# Patient Record
Sex: FEMALE | Race: WHITE | NOT HISPANIC OR LATINO | Employment: OTHER | URBAN - METROPOLITAN AREA
[De-identification: names, ages, dates, MRNs, and addresses within clinical notes are randomized per-mention and may not be internally consistent; named-entity substitution may affect disease eponyms.]

---

## 2022-01-01 ENCOUNTER — DOCUMENTATION (OUTPATIENT)
Dept: UROLOGY | Facility: CLINIC | Age: 82
End: 2022-01-01

## 2022-01-01 DIAGNOSIS — R56.9 SEIZURE (HCC): ICD-10-CM

## 2022-01-01 DIAGNOSIS — H54.7 VISUAL LOSS: Primary | ICD-10-CM

## 2022-01-01 DIAGNOSIS — R56.9 SEIZURE (HCC): Primary | ICD-10-CM

## 2022-01-01 RX ORDER — LEVETIRACETAM 100 MG/ML
SOLUTION ORAL
Qty: 473 ML | Refills: 3 | Status: SHIPPED | OUTPATIENT
Start: 2022-01-01 | End: 2023-01-01 | Stop reason: SDUPTHER

## 2022-05-31 PROBLEM — R13.10 DYSPHAGIA: Status: ACTIVE | Noted: 2022-05-31

## 2022-06-10 PROBLEM — G89.29 CHRONIC LOW BACK PAIN WITHOUT SCIATICA: Status: ACTIVE | Noted: 2022-06-10

## 2022-08-31 ENCOUNTER — TELEPHONE (OUTPATIENT)
Dept: NEUROLOGY | Facility: CLINIC | Age: 82
End: 2022-08-31

## 2022-08-31 NOTE — TELEPHONE ENCOUNTER
Received voicemail from [facility] to clarify prescriptions for Keppra  Requesting call back [phone]  Pharmacist said he would combine the 2 prescriptions sent (one 5 ml every 12 hours and the other 2 5 ml after dialysis on dialysis days) as that is the only way insurance will pay for it with instruction as follows  "Take 5 mL (500 mg total) by mouth every 12 (twelve) hours and Take 2 5 mL (250 mg total) by mouth After Dialysis (on dialysis days)  "

## 2022-09-01 PROBLEM — D50.9 IRON DEFICIENCY ANEMIA: Status: ACTIVE | Noted: 2022-09-01

## 2022-09-02 ENCOUNTER — TELEPHONE (OUTPATIENT)
Dept: NEUROLOGY | Facility: CLINIC | Age: 82
End: 2022-09-02

## 2022-09-02 NOTE — TELEPHONE ENCOUNTER
I spoke to patient's daughter who requested I emailed a lab slip  E-mail to address in demographics  Will have done at dialysis today

## 2022-09-02 NOTE — TELEPHONE ENCOUNTER
----- Message from Candice Webb PA-C sent at 9/1/2022  2:23 PM EDT -----  Regarding: Keppra level  Hi,    I entered a Keppra level for this pt to be done at dialysis  Can you call and make sure the daughter is aware that she should have it done? Thanks      Cheo Oh

## 2022-09-12 PROBLEM — E83.42 HYPOMAGNESEMIA: Status: ACTIVE | Noted: 2022-09-12

## 2022-10-17 ENCOUNTER — TELEPHONE (OUTPATIENT)
Dept: NEUROLOGY | Facility: CLINIC | Age: 82
End: 2022-10-17

## 2022-10-17 NOTE — TELEPHONE ENCOUNTER
MSW retrieved a message left by patient's daughter, [facility] ([phone])  [facility] stated that she had spoken with this writer previously about issues with her mother (MSW was involved to communicate with patient's dialysis clinic when extra treatments were necessary due to MRI)  [facility] stated that patient's vision has changed, that patient "cannot hardly see anything"  [facility] stated that she is not sure if she should just take her to an eye doctor  MSW called back to patient's daughter at [phone]  MSW advised that this writer is a , but will forward her clinical concerns on to the clinical team and provider  [facility] stated that patient has always worn the glasses you can buy at the store, and they have bought her 3 5 which is the highest strength they can find  [facility] stated that despite having the highest strength that it is "not good enough"  [facility] stated that patient is asking her to leave the lights on more so that she can see better and that she can no longer see to do crossword puzzles  MSW advised that the clinical team may call her with more questions, but that this writer will follow to see if an eye doctor referral is warranted  If so, MSW will assist in locating a list of in-network eye doctors

## 2022-10-18 NOTE — TELEPHONE ENCOUNTER
MSW obtained the following list of ophthalmologists from the www medicare  gov website:    Adán Moran MD  Charges the Medicare-approved amount (so you pay less out-of-pocket)  [address]  [facility], [facility]  [phone]    Leonides Ohara MD  [facility]  Charges the Medicare-approved amount (so you pay less out-of-pocket)  [address] [place], [place] [address] [phone]  Specialties  Ophthalmology    [facility] AFFILIATIONS  Charges the Medicare-approved amount (so you pay less out-of-pocket)  [facility] [address], 2021 [facility]   [phone]    [address] AFFILIATIONS  Charges the Medicare-approved amount (so you pay less out-of-pocket)  [address] [facility], 2021 [facility]   [phone]    MD Kasi Eng IV, MD  Charges the Medicare-approved amount (so you pay less out-of-pocket)  [address] [facility]   [facility], [address]  [phone]    [facility]   Lorraine Napier 1945 PC  Charges the Medicare-approved amount (so you pay less out-of-pocket)  [facility], 2021 [facility]   [phone]) [facility]  Charges the Medicare-approved amount (so you pay less out-of-pocket)  [facility] [address], 2021 [facility]   [phone]    [facility] AFFILIATIONS  Charges the Medicare-approved amount (so you pay less out-of-pocket)  [facility] [facility], 2021 [facility]   [phone]    MD Kasi Locke IV, MD  Charges the Medicare-approved amount (so you pay less out-of-pocket)  [address] [facility], [address]  [phone]    Kamara Fidencio  [facility] [address]  PC  Charges the Medicare-approved amount (so you pay less out-of-pocket)  [address] [place], [place] [address]  [phone]  [address] [place] [address] PC  Charges the Medicare-approved amount (so you pay less out-of-pocket)  59[address] [facility], 2021 [facility]   [phone]) [place] [address]  [address] PC  Charges the Medicare-approved amount (so you pay less out-of-pocket)  [address] [facility], 2021 [facility]   [phone]) Caroline U  79   [address] PC  Charges the Medicare-approved amount (so you pay less out-of-pocket)  [address] [facility], 2021 [facility]   [phone]    Humphrey Adorno  MULTIPLE GROUP AFFILIATIONS  Charges the Medicare-approved amount (so you pay less out-of-pocket)  [address] [facility], 2021 [facility]   [phone]    Fidencio A  [address] PC  Charges the Medicare-approved amount (so you pay less out-of-pocket)  59[address] [facility], 2021 [facility]   [phone]    David Garsia  Charges the [employer] amount (so you pay less out-of-pocket)  5900 [facility], [facility]  [phone]    [facility]  Charges the Medicare-approved amount (so you pay less out-of-pocket)  [facility]  28[facility], [address]  [phone] [address]  Charges the Medicare-approved amount (so you pay less out-of-pocket)  [facility]  2801 [facility], [place], [place] [address] [phone]    Omaira Ordaz MD, PC  Charges the Medicare-approved amount (so you pay less out-of-pocket)  [address] [place] [address] [phone]    Monica Hammond MD  MULTIPLE GROUP AFFILIATIONS  Charges the Medicare-approved amount (so you pay less out-of-pocket)  [address] [place] [address] [phone]    Giacomo Lott MD  MULTIPLE GROUP AFFILIATIONS  Charges the Medicare-approved amount (so you pay less out-of-pocket)  [address] [place], [place] [address]  [phone] [facility]  Charges the Medicare-approved amount (so you pay less out-of-pocket)  [address] [facility] 8977  [phone] [facility]  Charges the Medicare-approved amount (so you pay less out-of-pocket)  [address] [facility] 89  [phone]    Roger Johnson MD  MULTIPLE GROUP AFFILIATIONS  Charges the Medicare-approved amount (so you pay less out-of-pocket)  [address]  [facility], [place] [address]  [phone]  [address]  Charges the Medicare-approved amount (so you pay less out-of-pocket)  [address] [place] [address]  [phone]    MD [facility] amount (so you pay less out-of-pocket)  [facility], 1430 [facility]  [phone]) [address] [place] MD Brisa  [address] PC  Charges the Medicare-approved amount (so you pay less out-of-pocket)  [address], [address] [place]  [phone]    David Childers 105 PC  Charges the Medicare-approved amount (so you pay less out-of-pocket)  [address], [address]  [phone]    [address] [place] [address] AFFILIATIONS  Charges the Medicare-approved amount (so you pay less out-of-pocket)  [address] [facility], [address]  [phone]    [facility]   [address] [place] [address] PC  Charges the Medicare-approved amount (so you pay less out-of-pocket)  [address], [address] [facility] [address]  [phone]    Maria Guadalupe Patterson  Charges the Medicare-approved amount (so you pay less out-of-pocket)  Performance information available  [address]  [place], [address] [place] [address]  [phone]    Geraldo Amaya  MULTIPLE GROUP AFFILIATIONS  Charges the Medicare-approved amount (so you pay less out-of-pocket)  [address], 700 [facility]  [phone]    [facility]38 [facility] Shon Kuo  Charges the [employer] amount (so you pay less out-of-pocket)  [address], [address]   [phone]    [facility] Sicard Bunin  Charges the Medicare-approved amount (so you pay less out-of-pocket)  [address]  [facility]  [place], 102 \[facility] [place] [facility]\""  [phone]    [facility] P C  Charges the Medicare-approved amount (so you pay less out-of-pocket)  [address], [address]  [phone]    MSW will need to contact the above office to see if they accept [employer] as secondary

## 2022-10-19 ENCOUNTER — TELEPHONE (OUTPATIENT)
Dept: PAIN MEDICINE | Facility: CLINIC | Age: 82
End: 2022-10-19

## 2022-10-19 NOTE — TELEPHONE ENCOUNTER
Caller: Daughter     Doctor: Geovanna Kowalski    Reason for call: Wants to schedule injection for the Lt side     Call back#: [phone]

## 2022-10-20 NOTE — TELEPHONE ENCOUNTER
Patient daughter Harsha Garcia was contacted and scheduled patient for right and left hip injections   Patient will keep appointment to f/u from injection

## 2022-10-20 NOTE — TELEPHONE ENCOUNTER
MSW was able to contact the following ophthalmology offices to inquire if they accept Medicare primary and [address] as secondary  Available options are as follows:    Brad Mcintosh  [address]  5601 [facility], [address] [facility] [address]  [facility], [facility]  [phone]  *MSW left message requesting callback  Awaiting same  Dr Zita Blackwell (independent provider)  [facility], 301 [facility] 83,8Th Floor 200  [facility], [address]  [phone]  *spoke with Magalis Gore, they accept both insurances  New patients being scheduled late Dec/early Jan but if emergency, they may be able to schedule sooner  Dr Tahmina Santos (independent provider)  [facility], 1185 [facility], [address]  [phone]  *spoke with Lea Queen - he only takes Medicare, but they could bill the remaining 20% to patient (would need to pay up front)  Scheduling in early November  Dr [facility]  [address] [place] [address], [address]  *spoke with Linda Herman - they can accept Medicare, and can bill to Medicaid  [facility]  [address]  [address], [address]  [phone]  *spoke with [facility] - they accept both  Would need demographics and doctor to doctor consult (explaining why patient needs to be seen) faxed to them at [phone]  If accepted, they will call patient to schedule  [facility], [address], [address] [place] [address]  [phone]  *spoke with Kristin Bashir - they can bill Medicare  Patient would need to pay 20% as they cannot bill Medicaid    Dr Marlys Haynes (independent provider)  [address], [address]  [phone]  *spoke with Saskia Galvez - they accept both of patient's insurances  Would need referral faxed to [phone]  MSW will await callback before sending options to patient's daughter

## 2022-10-21 NOTE — TELEPHONE ENCOUNTER
MSW received callback from Zion at Dr Ghassan Braga office- he stated that they do accept Medicare and [address] as secondary  Zion stated that they eye exam would not be covered, but that glasses would not be  MSW will send the following updated list of in-network to sara's daughter:    Don Fonseca W  5601 [facility], [address]  [place], 98 [facility]  [phone]  *spoke with Zion, they do accept both Medicare and [employer]  Insurance would cover the assessment, but not glasses (if needed)       Dr Lisa Miles (independent provider)  [facility] 94, [address] [facility], [address]  [phone]  *spoke with Kiana Fuentes, they accept both insurances  New patients being scheduled late Dec/early Jan but if emergency, they may be able to schedule sooner      Dr Aníbal Hernandez (independent provider)  [facility] 94, 1185 [facility], [address]  [phone]  *spoke with Amedeo Gaucher - he only takes Medicare, but they could bill the remaining 20% to patient (would need to pay up front)  Scheduling in early November      Dr Ernst [facility]  [address] [facility], 98 [facility]  [phone]  *spoke with Maria Fernanda Hollingsworth - they can accept Medicare, and can bill to [address] [facility]  [address]  [address], [address]  [phone]  *spoke with Lissy Paul - they accept both insurances  Would need demographics and doctor to doctor consult (explaining why patient needs to be seen) faxed to them at [phone]  If accepted, they will call patient to schedule       [facility], [facility] 1268  [place], 210 [facility]  [phone]  *spoke with Catherine Byers - they can bill Medicare  Patient would need to pay 20% as they cannot bill Medicaid     Dr Daniel Michaels (independent provider)  [address], [address]  [phone]  *spoke with Khanh Ruiz - they accept both of patient's insurances   Would need referral faxed to [phone]      MSW attempted to reach patient's daughter to make her aware that list has been compiled and to see how should would like to receive same  No answer at [phone]  MSW left a message requesting callback  Awaiting same

## 2022-10-21 NOTE — TELEPHONE ENCOUNTER
Patient's daughter called back and asked that list of ophthalmologists be emailed to her at [email or web address]  MSW sent the following email:    "Elba Courtney,    Here are the [facility] ophthalmologists  The bolded ones are the practices that accept both insurances  Please let me know which practice you decide to go with  Some will require a referral to be sent from our office  Antonia Kit  [address]  5601 [facility], [address] [facility] [address]  [facility], [facility]  [phone]  *spoke with Zion, they do accept both Medicare and [employer]  Insurance would cover the assessment, but not glasses (if needed)  Dr Juna Banks (independent provider)  [facility] 94, 301 [facility] 83,8Th Floor 200  [facility], [address]  [phone]  *[facility] with [facility], they accept both insurances  New patients being scheduled late Dec/early Jan but if emergency, they may be able to schedule sooner  Dr Charlee Ayala (independent provider)  [facility] 94, 1185 [facility], [address]   [phone]  *spoke with [facility] - he only takes Medicare, but they could bill the remaining 20% to patient (would need to pay up front)  Scheduling in early November  Dr Suki D[facility]  [address] [place] [address], [address]  *spoke with Ksenia Chau - they can accept Medicare, and can bill to Medicaid (unsure if secondary would pay)     [facility]  [address]  [address] [facility], [address]  [phone]  *spoke with Teresa Kumar - they accept both insurances  Would need demographics and doctor to doctor consult (explaining why patient needs to be seen) faxed to them at [phone]  If accepted, they will call patient to schedule  [facility], [facility] 1268  [place], 210 [facility]  [phone]  *spoke with Erin Smith - they can bill Medicare   Patient would need to pay 20% as they cannot bill Medicaid     Dr Agnes De La Vega (independent provider)  [address], [address]  [phone]  *spoke with Randolm Goodell - they accept both of patient's insurances  Would need referral faxed to [phone]  Best Regards,    Jaclyn Taylor, MSW   Douglas Mejía [facility]  [facility] 50, [address]  [phone] - phone  [phone] - fax   [facility]"

## 2022-10-24 NOTE — TELEPHONE ENCOUNTER
Caller: Harsha Garcia ( daughter )     Doctor: Dr Taj Mesa for call: Procedure     Call back#: [phone]

## 2022-10-24 NOTE — TELEPHONE ENCOUNTER
Called Vivian back and discussed B/L SIJ injection  Advised her next available date Patient would be eligible for B/L SIJ on the same day would be after January 12th   She will call  to that date

## 2022-10-25 NOTE — TELEPHONE ENCOUNTER
MSW received call from patient's daughter, Juliet Neil, this date  She would like patient to be referred to [facility]  A doctor to doctor referral is needed  MSW noted that script was placed, but will need to ask PA-C for reason for patient to be seen by ophthalmology instead of optometry  Mabel Gan, can you please advise?

## 2022-10-26 NOTE — TELEPHONE ENCOUNTER
MSW spoke with RENO this date  Reason for ophthalmology referral is history of carotid occulusion R/O vascular cause of vision loss, also patient has diabetes and renal failure which may affect vision  MSW faxed referral (patient demographics, copies of insurance cards, neuro note, and script) to [facility] at [phone] this date  Successful fax notice received  MSW will follow to ensure that referral is received and that patient gets scheduled in a timely manner

## 2022-10-27 NOTE — TELEPHONE ENCOUNTER
MSW phoned the [address] [facility] and spoke with Zeinab Plascencia at [phone]  They received the referral and it will be reviewed over the next 1-2 days  They will call patient's daughter early next week to schedule appt  MSW phoned patient's daughter to provide update  MSW will follow to ensure that appt is scheduled

## 2022-10-31 NOTE — TELEPHONE ENCOUNTER
Pts daughter called to say that there was issues with getting an appt made  She said they would not make an appt for her and that they do not take her insurance  She was also told they cant see her for the eye issues she is having  Please call daughter back for more clarification

## 2022-10-31 NOTE — TELEPHONE ENCOUNTER
MSW phoned [address] at [phone] and spoke with Zeinab Plascencia  She again stated that they do accept both of patient's plans  Zeinab Plascencia stated that the referral was reviewed by a tech and that they can schedule the patient  Zeinab Plascencia stated that she will call patient's daughter, Carolee Timmons, to schedule an appt  Zeinab Plascencia stated that there is another office with a similar name so it is possible that patient called the wrong office  MSW later received a callback from Zeinab Plascencia - patient is scheduled to see Dr Cleo Contreras at [facility] location on 11/16/22 at 815AM     MSW followed-up with patient's daughter, Carolee Timmons, she confirmed that patient is scheduled with [address] on 11/16/22  Carolee Timmons stated that she must have called the wrong office first and apologized for the error  MSW will be available to patient/family as needed

## 2022-11-01 ENCOUNTER — HOSPITAL ENCOUNTER (OUTPATIENT)
Dept: ULTRASOUND IMAGING | Facility: MEDICAL CENTER | Age: 82
Discharge: HOME | End: 2022-11-01

## 2022-11-01 DIAGNOSIS — R39.89 BLADDER PAIN: ICD-10-CM

## 2022-11-03 ENCOUNTER — OFFICE VISIT (OUTPATIENT)
Dept: PAIN MEDICINE | Facility: CLINIC | Age: 82
End: 2022-11-03

## 2022-11-03 VITALS
WEIGHT: 124 LBS | HEIGHT: 64 IN | DIASTOLIC BLOOD PRESSURE: 83 MMHG | SYSTOLIC BLOOD PRESSURE: 124 MMHG | BODY MASS INDEX: 21.17 KG/M2

## 2022-11-03 DIAGNOSIS — M54.50 CHRONIC LOW BACK PAIN WITHOUT SCIATICA, UNSPECIFIED BACK PAIN LATERALITY: Primary | ICD-10-CM

## 2022-11-03 DIAGNOSIS — Z98.1 STATUS POST LUMBAR SPINAL FUSION: ICD-10-CM

## 2022-11-03 DIAGNOSIS — M16.0 PRIMARY OSTEOARTHRITIS OF BOTH HIPS: ICD-10-CM

## 2022-11-03 DIAGNOSIS — Z79.01 CHRONIC ANTICOAGULATION: ICD-10-CM

## 2022-11-03 DIAGNOSIS — G89.29 CHRONIC LOW BACK PAIN WITHOUT SCIATICA, UNSPECIFIED BACK PAIN LATERALITY: Primary | ICD-10-CM

## 2022-11-03 DIAGNOSIS — M46.1 SACROILIITIS (HCC): ICD-10-CM

## 2022-11-03 RX ORDER — LORAZEPAM 0.5 MG/1
0.5 TABLET ORAL EVERY 8 HOURS PRN
COMMUNITY
Start: 2022-09-08

## 2022-11-03 RX ORDER — CLOPIDOGREL BISULFATE 75 MG/1
TABLET ORAL
COMMUNITY

## 2022-11-03 NOTE — PROGRESS NOTES
Assessment:  1  Chronic low back pain without sciatica, unspecified back pain laterality    2  Sacroiliitis ([address] )    3  Primary osteoarthritis of both hips    4  Status post lumbar spinal fusion    5  Chronic anticoagulation        Plan:  1  Patient will move forward with hip injections as scheduled  I did place an order for PT INR to be drawn prior to her procedure on November 8, 2022  2  Can repeat bilateral SI joint injections every 3 months p r n   3  Patient may continue hydromorphone as prescribed by palliative Medicine  4  Will avoid NSAIDs secondary to CKD and anticoagulation  5  Patient may continue Tylenol p r n   6  Follow up after procedure or sooner if needed     History of Present Illness:    Office visit translated by patient's niece who accompanies her to today's appointment  The patient is a [de-identified] y o  female with a history of diabetes, seizure disorder, end-stage renal disease on hemodialysis, CAD, AFib on Eliquis, CHF, chronic respiratory failure, L3-S1 fusion and T12-L1 kyphoplasties last seen on 8/10/22 who presents for a follow up office visit in regards to chronic low back pain that will radiate into the bilateral hips secondary to sacroiliitis and osteoarthritis of the hips  The patient denies bowel or bladder incontinence or saddle anesthesia  Patient has had left intra-articular hip injection on September 21, 2022 and a left SI joint injection on October 12, 2022 reports a 50% improvement of her pain from these procedures which is ongoing at this time  She is scheduled for a repeat left intra-articular hip injection in December a right intra-articular hip injection on November 8, 2022  She continues to follow with [facility] palliative care team and is taking hydromorphone 2 mg q 4 hours p r n  pain which she finds only provided relief for a very short time  Patient rates her pain an 8/10 on the numeric pain rating scale    She intermittently has pain throughout the day which is described as burning    I have personally reviewed and/or updated the patient's past medical history, past surgical history, family history, social history, current medications, allergies, and vital signs today  Review of Systems:    Review of Systems   Respiratory: Negative for shortness of breath  Cardiovascular: Negative for chest pain  Gastrointestinal: Negative for constipation, diarrhea, nausea and vomiting  Musculoskeletal: Negative for arthralgias, gait problem, joint swelling and myalgias  Skin: Negative for rash  Neurological: Negative for dizziness, seizures and weakness  All other systems reviewed and are negative  Past Medical History:   Diagnosis Date   • Breast cancer ([facility] )    • Hypertension    • Ovarian cancer ([facility] )    • Renal disorder    • Skin cancer        Past Surgical History:   Procedure Laterality Date   • BREAST SURGERY     •  SECTION     • EXPLORATORY LAPAROTOMY     • GALLBLADDER SURGERY     • HYSTERECTOMY     • IR THORACENTESIS  2022       History reviewed  No pertinent family history  Social History     Occupational History   • Not on file   Tobacco Use   • Smoking status: Never Smoker   • Smokeless tobacco: Never Used   Substance and Sexual Activity   • Alcohol use: Not Currently   • Drug use: Never   • Sexual activity: Not on file         Current Outpatient Medications:   •  acetaminophen (TYLENOL) 500 mg tablet, Take 500 mg by mouth every 6 (six) hours as needed, Disp: , Rfl:   •  atorvastatin (LIPITOR) 40 mg tablet, Take 1 tablet by mouth every evening, Disp: , Rfl:   •  clopidogrel (Plavix) 75 mg tablet, Take by mouth, Disp: , Rfl:   •  Diclofenac Sodium (VOLTAREN) 1 %, Apply 2 g topically daily   Indications: Joint Damage causing Pain and Loss of Function, Disp: , Rfl:   •  epoetin patricia (Procrit) 2,000 units/mL, , Disp: , Rfl:   •  HYDROmorphone (DILAUDID) 2 mg tablet, Take 2 mg by mouth every 4 (four) hours as needed for moderate pain, Disp: , Rfl:   • hydrOXYzine HCL (ATARAX) 25 mg tablet, Take 25 mg by mouth Three times daily as needed, Disp: , Rfl:   •  letrozole (FEMARA) 2 5 mg tablet, Take 2 5 mg by mouth in the morning, Disp: , Rfl:   •  levETIRAcetam (KEPPRA) 100 mg/mL oral solution, 5mL by mouth every 12 hours  On dialysis days only, take an additional 2 5mL after dialysis  , Disp: 473 mL, Rfl: 3  •  LORazepam (ATIVAN) 0 5 mg tablet, Take 0 5 mg by mouth every 8 (eight) hours as needed, Disp: , Rfl:   •  melatonin 3 mg, Take 3 mg by mouth daily at bedtime, Disp: , Rfl:   •  metoprolol succinate (TOPROL-XL) 25 mg 24 hr tablet, Take 25 mg by mouth 2 (two) times a day  Indications: High Blood Pressure Disorder, Disp: , Rfl:   •  Multiple Vitamins-Minerals (ProRenal + D) TABS, Take 1 tablet by mouth every evening   Indications: RENAL FAILURE, CHRONIC, Disp: , Rfl:   •  nitroglycerin (NITROSTAT) 0 4 mg SL tablet, Place 0 4 mg under the tongue, Disp: , Rfl:   •  ondansetron (ZOFRAN) 4 mg tablet, Take 4 mg by mouth every 8 (eight) hours as needed for nausea or vomiting, Disp: , Rfl:   •  pantoprazole (PROTONIX) 20 mg tablet, Take 1 tablet by mouth daily pt takes prilosec 20mg daily , Disp: , Rfl:   •  Polyethylene Glycol 3350 (MIRALAX PO), Take by mouth, Disp: , Rfl:   •  senna-docusate sodium (SENOKOT S) 8 6-50 mg per tablet, Take 1 tablet by mouth daily as needed, Disp: , Rfl:   •  simethicone (MYLICON) 80 mg chewable tablet, Chew 1 tab po once daily, Disp: 90 tablet, Rfl: 3  •  warfarin (COUMADIN) 5 mg tablet, Take 1 tablet (5 mg total) by mouth daily, Disp: 30 tablet, Rfl: 0    Allergies   Allergen Reactions   • Morphine Anaphylaxis     Hallucinations and hives per daughter  anaphylaxis     • Ace Inhibitors Other (See Comments)     dialysis     • Codeine GI Intolerance     "heart racing"  hear racing     • Iodine - Food Allergy Other (See Comments)     Must avoid due to kidneys and thyroid     • Nsaids Other (See Comments)   • Other Other (See Comments) dialysis   • Tramadol Hives     Sleepiness per daughter who reports "sensitivity"         Physical Exam:    /83   Ht 5' 4" (1 626 m)   Wt 56 2 kg (124 lb)   BMI 21 28 kg/m²     Constitutional:normal, well developed, well nourished, alert, in no distress and non-toxic and no overt pain behavior    Eyes:anicteric  HEENT:grossly intact  Neck:supple, symmetric, trachea midline and no masses   Pulmonary:even and unlabored  Cardiovascular:No edema or pitting edema present  Skin:Normal without rashes or lesions and well hydrated  Psychiatric:Mood and affect appropriate  Neurologic:Cranial Nerves II-XII grossly intact  Musculoskeletal:in wheelchair      Imaging  No orders to display         Orders Placed This Encounter   Procedures   • Protime-INR

## 2022-11-04 ENCOUNTER — TELEPHONE (OUTPATIENT)
Dept: FAMILY MEDICINE CLINIC | Facility: CLINIC | Age: 82
End: 2022-11-04

## 2022-11-04 DIAGNOSIS — R39.89 BLADDER PAIN: Primary | ICD-10-CM

## 2022-11-04 NOTE — TELEPHONE ENCOUNTER
Pt's daughter called stating that her mom is sill in pain  She wants to know what she needs to do for her mom  She stated she would like her mom to see a Urologist & possibly a GYN  Please advise

## 2022-11-08 ENCOUNTER — HOSPITAL ENCOUNTER (OUTPATIENT)
Dept: RADIOLOGY | Facility: CLINIC | Age: 82
Discharge: HOME | End: 2022-11-08

## 2022-11-08 VITALS
RESPIRATION RATE: 20 BRPM | DIASTOLIC BLOOD PRESSURE: 66 MMHG | TEMPERATURE: 97.8 F | OXYGEN SATURATION: 95 % | SYSTOLIC BLOOD PRESSURE: 143 MMHG | HEART RATE: 74 BPM

## 2022-11-08 DIAGNOSIS — M16.11 OSTEOARTHRITIS OF RIGHT HIP: ICD-10-CM

## 2022-11-08 RX ORDER — BUPIVACAINE HCL/PF 2.5 MG/ML
3 VIAL (ML) INJECTION ONCE
Status: COMPLETED | OUTPATIENT
Start: 2022-11-08 | End: 2022-11-08

## 2022-11-08 RX ORDER — LIDOCAINE HYDROCHLORIDE 10 MG/ML
3 INJECTION, SOLUTION EPIDURAL; INFILTRATION; INTRACAUDAL; PERINEURAL ONCE
Status: COMPLETED | OUTPATIENT
Start: 2022-11-08 | End: 2022-11-08

## 2022-11-08 RX ORDER — METHYLPREDNISOLONE ACETATE 40 MG/ML
40 INJECTION, SUSPENSION INTRA-ARTICULAR; INTRALESIONAL; INTRAMUSCULAR; PARENTERAL; SOFT TISSUE ONCE
Status: COMPLETED | OUTPATIENT
Start: 2022-11-08 | End: 2022-11-08

## 2022-11-08 RX ADMIN — BUPIVACAINE HYDROCHLORIDE 3 ML: 2.5 INJECTION, SOLUTION EPIDURAL; INFILTRATION; INTRACAUDAL at 14:55

## 2022-11-08 RX ADMIN — IOHEXOL 1 ML: 300 INJECTION, SOLUTION INTRAVENOUS at 14:54

## 2022-11-08 RX ADMIN — METHYLPREDNISOLONE ACETATE 40 MG: 40 INJECTION, SUSPENSION INTRA-ARTICULAR; INTRALESIONAL; INTRAMUSCULAR; SOFT TISSUE at 14:55

## 2022-11-08 RX ADMIN — LIDOCAINE HYDROCHLORIDE 3 ML: 10 INJECTION, SOLUTION EPIDURAL; INFILTRATION; INTRACAUDAL; PERINEURAL at 14:53

## 2022-11-08 NOTE — H&P
History of Present Illness: The patient is a [de-identified] y o  female who presents with complaints of hip pain  Past Medical History:   Diagnosis Date   • Breast cancer ([facility] )    • Hypertension    • Ovarian cancer ([facility] [address] )    • Renal disorder    • Skin cancer        Past Surgical History:   Procedure Laterality Date   • BREAST SURGERY     •  SECTION     • EXPLORATORY LAPAROTOMY     • GALLBLADDER SURGERY     • HYSTERECTOMY     • IR THORACENTESIS  2022         Current Outpatient Medications:   •  acetaminophen (TYLENOL) 500 mg tablet, Take 500 mg by mouth every 6 (six) hours as needed, Disp: , Rfl:   •  atorvastatin (LIPITOR) 40 mg tablet, Take 1 tablet by mouth every evening, Disp: , Rfl:   •  clopidogrel (PLAVIX) 75 mg tablet, Take by mouth (Patient not taking: Reported on 2022), Disp: , Rfl:   •  Diclofenac Sodium (VOLTAREN) 1 %, Apply 2 g topically daily  Indications: Joint Damage causing Pain and Loss of Function, Disp: , Rfl:   •  epoetin patricia (Procrit) 2,000 units/mL, , Disp: , Rfl:   •  HYDROmorphone (DILAUDID) 2 mg tablet, Take 2 mg by mouth every 4 (four) hours as needed for moderate pain, Disp: , Rfl:   •  hydrOXYzine HCL (ATARAX) 25 mg tablet, Take 25 mg by mouth Three times daily as needed, Disp: , Rfl:   •  letrozole (FEMARA) 2 5 mg tablet, Take 2 5 mg by mouth in the morning, Disp: , Rfl:   •  levETIRAcetam (KEPPRA) 100 mg/mL oral solution, 5mL by mouth every 12 hours  On dialysis days only, take an additional 2 5mL after dialysis  , Disp: 473 mL, Rfl: 3  •  LORazepam (ATIVAN) 0 5 mg tablet, Take 0 5 mg by mouth every 8 (eight) hours as needed, Disp: , Rfl:   •  melatonin 3 mg, Take 3 mg by mouth daily at bedtime, Disp: , Rfl:   •  metoprolol succinate (TOPROL-XL) 25 mg 24 hr tablet, Take 25 mg by mouth 2 (two) times a day  Indications: High Blood Pressure Disorder, Disp: , Rfl:   •  Multiple Vitamins-Minerals (ProRenal + D) TABS, Take 1 tablet by mouth every evening   Indications: RENAL FAILURE, CHRONIC, Disp: , Rfl:   •  nitroglycerin (NITROSTAT) 0 4 mg SL tablet, Place 0 4 mg under the tongue, Disp: , Rfl:   •  ondansetron (ZOFRAN) 4 mg tablet, Take 4 mg by mouth every 8 (eight) hours as needed for nausea or vomiting, Disp: , Rfl:   •  pantoprazole (PROTONIX) 20 mg tablet, Take 1 tablet by mouth daily pt takes prilosec 20mg daily , Disp: , Rfl:   •  Polyethylene Glycol 3350 (MIRALAX PO), Take by mouth, Disp: , Rfl:   •  senna-docusate sodium (SENOKOT S) 8 6-50 mg per tablet, Take 1 tablet by mouth daily as needed, Disp: , Rfl:   •  simethicone (MYLICON) 80 mg chewable tablet, Chew 1 tab po once daily, Disp: 90 tablet, Rfl: 3  •  warfarin (COUMADIN) 5 mg tablet, Take 1 tablet (5 mg total) by mouth daily, Disp: 30 tablet, Rfl: 0    Current Facility-Administered Medications:   •  bupivacaine (PF) (MARCAINE) 0 25 % injection 3 mL, 3 mL, Intra-articular, Once, Rayna Conley, DO  •  iohexol (OMNIPAQUE) 300 mg/mL injection 1 mL, 1 mL, Intra-articular, Once, Blair Caldera DO  •  lidocaine (PF) (XYLOCAINE-MPF) 1 % injection 3 mL, 3 mL, Other, Once, Blair Caldera, DO  •  methylPREDNISolone acetate (DEPO-MEDROL) injection 40 mg, 40 mg, Intra-articular, Once, Hailey Caldera DO    Allergies   Allergen Reactions   • Morphine Anaphylaxis     Hallucinations and hives per daughter  anaphylaxis     • Ace Inhibitors Other (See Comments)     dialysis     • Codeine GI Intolerance     "heart racing"  hear racing     • Iodine - Food Allergy Other (See Comments)     Must avoid due to kidneys and thyroid     • Nsaids Other (See Comments)   • Other Other (See Comments)     dialysis   • Tramadol Hives     Sleepiness per daughter who reports "sensitivity"         Physical Exam:   Vitals:    11/08/22 1435   BP: 170/80   Pulse: 69   Resp: 20   Temp: 97 8 °F (36 6 °C)   SpO2: 96%     General: Awake, Alert, Oriented x 3, Mood and affect appropriate  Respiratory: Respirations even and unlabored  Cardiovascular: Peripheral pulses intact; no edema  Musculoskeletal Exam:  Antalgic gait    ASA Score: 4    Patient/Chart Verification  Patient ID Verified: Verbal  ID Band Applied: No  Consents Confirmed: Procedural, To be obtained in the Pre-Procedure area  Interval H&P(within 24 hr) Complete (required for Outpatients and Surgery Admit only): To be obtained in the Pre-Procedure area  Allergies Reviewed: Yes  Anticoag/NSAID held?: NA  Currently on antibiotics?: No    Assessment:   1   Osteoarthritis of right hip        Plan: Right intra-articular hip injection

## 2022-11-08 NOTE — DISCHARGE INSTRUCTIONS
Steroid Joint Injection   WHAT YOU NEED TO KNOW:   A steroid joint injection is a procedure to inject steroid medicine into a joint  Steroid medicine decreases pain and inflammation  The injection may also contain an anesthetic (numbing medicine) to decrease pain  It may be done to treat conditions such as arthritis, gout, or carpal tunnel syndrome  The injections may be given in your knee, ankle, shoulder, elbow, wrist, ankle or sacroiliac joint  Do not apply heat to any area that is numb  If you have discomfort or soreness at the injection site, you may apply ice today, 20 minutes on and 20 minutes off  Tomorrow you may use ice or warm, moist heat  Do not apply ice or heat directly to the skin  You may have an increase or change in the discomfort for 36-48 hours after your treatment  Apply ice and continue with any pain medicine you have been prescribed  Do not do anything strenuous today  You may shower, but no tub baths or hot tubs today  You may resume your normal activities tomorrow, but do not “overdo it”  Resume normal activities slowly when you are feeling better  If you experience redness, drainage or swelling at the injection site, or if you develop a fever above 100 degrees, please call [facility] at [phone] or go to the Emergency Room  Continue to take all routine medicines prescribed by your primary care physician unless otherwise instructed by our staff  Most blood thinners should be started again according to your regularly scheduled dosing  If you have any questions, please give our office a call  As no general anesthesia was used in today's procedure, you should not experience any side effects related to anesthesia  If you are diabetic, the steroids used in today's injection may temporarily increase your blood sugar levels after the first few days after your injection   Please keep a close eye on your sugars and alert the doctor who manages your diabetes if your sugars are significantly high from your baseline or you are symptomatic  If you have a problem specifically related to your procedure, please call our office at [phone]  Problems not related to your procedure should be directed to your primary care physician

## 2022-11-15 ENCOUNTER — TELEPHONE (OUTPATIENT)
Dept: PAIN MEDICINE | Facility: CLINIC | Age: 82
End: 2022-11-15

## 2022-11-17 NOTE — TELEPHONE ENCOUNTER
Attempted to call the patient and left a detailed mom stating she is scheduled to have a left hip injection already

## 2022-11-17 NOTE — TELEPHONE ENCOUNTER
Caller: Patient daughter stevan    Doctor: Clau Neville    Reason for call: Pain level 6  Improvement 85%  12/28 if patient will be getting анна hip injection?     Call back#:

## 2022-11-19 ENCOUNTER — HOSPITAL ENCOUNTER (EMERGENCY)
Facility: HOSPITAL | Age: 82
Discharge: HOME | End: 2022-11-19
Attending: EMERGENCY MEDICINE

## 2022-11-19 ENCOUNTER — APPOINTMENT (EMERGENCY)
Dept: CT IMAGING | Facility: HOSPITAL | Age: 82
End: 2022-11-19

## 2022-11-19 ENCOUNTER — APPOINTMENT (EMERGENCY)
Dept: RADIOLOGY | Facility: HOSPITAL | Age: 82
End: 2022-11-19

## 2022-11-19 VITALS
DIASTOLIC BLOOD PRESSURE: 63 MMHG | OXYGEN SATURATION: 97 % | RESPIRATION RATE: 18 BRPM | TEMPERATURE: 98.4 F | HEART RATE: 62 BPM | SYSTOLIC BLOOD PRESSURE: 147 MMHG

## 2022-11-19 DIAGNOSIS — J98.11 ATELECTASIS: ICD-10-CM

## 2022-11-19 DIAGNOSIS — J90 PLEURAL EFFUSION: ICD-10-CM

## 2022-11-19 DIAGNOSIS — R77.8 ELEVATED TROPONIN: ICD-10-CM

## 2022-11-19 DIAGNOSIS — N89.8 VAGINAL DISCHARGE: ICD-10-CM

## 2022-11-19 DIAGNOSIS — R06.02 SHORTNESS OF BREATH: Primary | ICD-10-CM

## 2022-11-19 LAB
2HR DELTA HS TROPONIN: -1 NG/L
ALBUMIN SERPL BCP-MCNC: 3 G/DL (ref 3.5–5)
ALP SERPL-CCNC: 123 U/L (ref 46–116)
ALT SERPL W P-5'-P-CCNC: 18 U/L (ref 12–78)
ANION GAP SERPL CALCULATED.3IONS-SCNC: 2 MMOL/L (ref 4–13)
APTT PPP: 43 SECONDS (ref 23–37)
BASOPHILS # BLD AUTO: 0.02 THOUSANDS/ÂΜL (ref 0–0.1)
BASOPHILS NFR BLD AUTO: 0 % (ref 0–1)
BILIRUB SERPL-MCNC: 0.34 MG/DL (ref 0.2–1)
BUN SERPL-MCNC: 25 MG/DL (ref 5–25)
CALCIUM ALBUM COR SERPL-MCNC: 9.9 MG/DL (ref 8.3–10.1)
CALCIUM SERPL-MCNC: 9.1 MG/DL (ref 8.3–10.1)
CARDIAC TROPONIN I PNL SERPL HS: 8 NG/L
CARDIAC TROPONIN I PNL SERPL HS: 9 NG/L
CHLORIDE SERPL-SCNC: 104 MMOL/L (ref 96–108)
CO2 SERPL-SCNC: 36 MMOL/L (ref 21–32)
CREAT SERPL-MCNC: 3.2 MG/DL (ref 0.6–1.3)
D DIMER PPP FEU-MCNC: 0.58 UG/ML FEU
EOSINOPHIL # BLD AUTO: 0.16 THOUSAND/ÂΜL (ref 0–0.61)
EOSINOPHIL NFR BLD AUTO: 3 % (ref 0–6)
ERYTHROCYTE [DISTWIDTH] IN BLOOD BY AUTOMATED COUNT: 14.8 % (ref 11.6–15.1)
GFR SERPL CREATININE-BSD FRML MDRD: 13 ML/MIN/1.73SQ M
GLUCOSE SERPL-MCNC: 185 MG/DL (ref 65–140)
HCT VFR BLD AUTO: 34.4 % (ref 34.8–46.1)
HGB BLD-MCNC: 10.8 G/DL (ref 11.5–15.4)
IMM GRANULOCYTES # BLD AUTO: 0.01 THOUSAND/UL (ref 0–0.2)
IMM GRANULOCYTES NFR BLD AUTO: 0 % (ref 0–2)
INR PPP: 2.91 (ref 0.84–1.19)
LYMPHOCYTES # BLD AUTO: 1.23 THOUSANDS/ÂΜL (ref 0.6–4.47)
LYMPHOCYTES NFR BLD AUTO: 25 % (ref 14–44)
MCH RBC QN AUTO: 30.4 PG (ref 26.8–34.3)
MCHC RBC AUTO-ENTMCNC: 31.4 G/DL (ref 31.4–37.4)
MCV RBC AUTO: 97 FL (ref 82–98)
MONOCYTES # BLD AUTO: 0.34 THOUSAND/ÂΜL (ref 0.17–1.22)
MONOCYTES NFR BLD AUTO: 7 % (ref 4–12)
NEUTROPHILS # BLD AUTO: 3.27 THOUSANDS/ÂΜL (ref 1.85–7.62)
NEUTS SEG NFR BLD AUTO: 65 % (ref 43–75)
NRBC BLD AUTO-RTO: 0 /100 WBCS
PLATELET # BLD AUTO: 165 THOUSANDS/UL (ref 149–390)
PMV BLD AUTO: 10.4 FL (ref 8.9–12.7)
POTASSIUM SERPL-SCNC: 4.3 MMOL/L (ref 3.5–5.3)
PROT SERPL-MCNC: 6.4 G/DL (ref 6.4–8.4)
PROTHROMBIN TIME: 30.2 SECONDS (ref 11.6–14.5)
RBC # BLD AUTO: 3.55 MILLION/UL (ref 3.81–5.12)
SODIUM SERPL-SCNC: 142 MMOL/L (ref 135–147)
WBC # BLD AUTO: 5.03 THOUSAND/UL (ref 4.31–10.16)

## 2022-11-19 RX ORDER — HYDROMORPHONE HCL/PF 1 MG/ML
0.5 SYRINGE (ML) INJECTION ONCE
Status: COMPLETED | OUTPATIENT
Start: 2022-11-19 | End: 2022-11-19

## 2022-11-19 RX ORDER — IODIXANOL 320 MG/ML
85 INJECTION, SOLUTION INTRAVASCULAR
Status: COMPLETED | OUTPATIENT
Start: 2022-11-19 | End: 2022-11-19

## 2022-11-19 RX ADMIN — HYDROMORPHONE HYDROCHLORIDE 0.5 MG: 1 INJECTION, SOLUTION INTRAMUSCULAR; INTRAVENOUS; SUBCUTANEOUS at 13:45

## 2022-11-19 RX ADMIN — IODIXANOL 85 ML: 320 INJECTION, SOLUTION INTRAVASCULAR at 15:59

## 2022-11-19 NOTE — DISCHARGE INSTRUCTIONS
-Schedule follow up with PCP   -please return if you have worsening shortness of breath   -we will call with results of vaginosis panel

## 2022-11-19 NOTE — ED PROVIDER NOTES
History  Chief Complaint   Patient presents with   • Vaginal Discharge     Pt reports green vaginal discharge, lower abdominal pain, pain with urination  • Shortness of Breath     Pt reports sob earlier today, resolved since  Dialysis mon/wed/fri  Pt presents to the ED for an episode of SOB - was brief in nature and resolved spontaneously - daughter reports she had pleural effusions that needed to be tapped last time she was SOB like this  No CP  And now feels better  Has been having some fluid - thick - coming likely from urethra - pt doesn't make urine - never bothered her but now when she passes it - a few x a day - has pain and states it burns and reports abdominal pain  No fevers or vomiting  Normal BM today  Pt is on dialysis M/W/F - receiving regularly  On coumadin - hx of DVT in arm  Lives at home with family - daughter helps to care for her  Prior to Admission Medications   Prescriptions Last Dose Informant Patient Reported? Taking? Diclofenac Sodium (VOLTAREN) 1 %   Yes No   Sig: Apply 2 g topically daily  Indications: Joint Damage causing Pain and Loss of Function   HYDROmorphone (DILAUDID) 2 mg tablet   Yes No   Sig: Take 2 mg by mouth every 4 (four) hours as needed for moderate pain   LORazepam (ATIVAN) 0 5 mg tablet   Yes No   Sig: Take 0 5 mg by mouth every 8 (eight) hours as needed   Multiple Vitamins-Minerals (ProRenal + D) TABS   Yes No   Sig: Take 1 tablet by mouth every evening   Indications: RENAL FAILURE, CHRONIC   Polyethylene Glycol 3350 (MIRALAX PO)   Yes No   Sig: Take by mouth   acetaminophen (TYLENOL) 500 mg tablet   Yes No   Sig: Take 500 mg by mouth every 6 (six) hours as needed   atorvastatin (LIPITOR) 40 mg tablet   Yes No   Sig: Take 1 tablet by mouth every evening   clopidogrel (PLAVIX) 75 mg tablet   Yes No   Sig: Take by mouth   Patient not taking: Reported on 11/8/2022   epoetin patricia (Procrit) 2,000 units/mL   Yes No   hydrOXYzine HCL (ATARAX) 25 mg tablet   Yes No   Sig: Take 25 mg by mouth Three times daily as needed   letrozole [facility]) 2 5 mg tablet   Yes No   Sig: Take 2 5 mg by mouth in the morning   levETIRAcetam (KEPPRA) 100 mg/mL oral solution   No No   SimL by mouth every 12 hours  On dialysis days only, take an additional 2 5mL after dialysis  melatonin 3 mg   Yes No   Sig: Take 3 mg by mouth daily at bedtime   metoprolol succinate (TOPROL-XL) 25 mg 24 hr tablet   Yes No   Sig: Take 25 mg by mouth 2 (two) times a day  Indications: High Blood Pressure Disorder   nitroglycerin (NITROSTAT) 0 4 mg SL tablet   Yes No   Sig: Place 0 4 mg under the tongue   ondansetron (ZOFRAN) 4 mg tablet   Yes No   Sig: Take 4 mg by mouth every 8 (eight) hours as needed for nausea or vomiting   pantoprazole (PROTONIX) 20 mg tablet   Yes No   Sig: Take 1 tablet by mouth daily pt takes prilosec 20mg daily    senna-docusate sodium (SENOKOT S) 8 6-50 mg per tablet   Yes No   Sig: Take 1 tablet by mouth daily as needed   simethicone (MYLICON) 80 mg chewable tablet   No No   Sig: Chew 1 tab po once daily   warfarin (COUMADIN) 5 mg tablet   No No   Sig: Take 1 tablet (5 mg total) by mouth daily      Facility-Administered Medications: None       Past Medical History:   Diagnosis Date   • Breast cancer ([facility] [address] [facility] )    • Hypertension    • [facility]    • Renal disorder    • Skin cancer        Past Surgical History:   Procedure Laterality Date   • BREAST SURGERY     •  SECTION     • EXPLORATORY LAPAROTOMY     • GALLBLADDER SURGERY     • HYSTERECTOMY     • IR THORACENTESIS  2022       History reviewed  No pertinent family history  I have reviewed and agree with the history as documented      E-Cigarette/Vaping     E-Cigarette/Vaping Substances     Social History     Tobacco Use   • Smoking status: Never   • Smokeless tobacco: Never   Substance Use Topics   • Alcohol use: Not Currently   • Drug use: Never       Review of Systems   Constitutional: Negative for fever    HENT: Negative  Respiratory: Positive for shortness of breath  Cardiovascular: Negative  Gastrointestinal: Positive for abdominal pain  Genitourinary: Positive for dysuria  All other systems reviewed and are negative  Physical Exam  Physical Exam  Vitals and nursing note reviewed  Constitutional:       Appearance: She is well-developed and well-nourished  HENT:      Head: Normocephalic and atraumatic  Right Ear: External ear normal       Left Ear: External ear normal       Mouth/Throat:      Mouth: Oropharynx is clear and moist    Eyes:      Extraocular Movements: EOM normal       Conjunctiva/sclera: Conjunctivae normal    Cardiovascular:      Rate and Rhythm: Normal rate and regular rhythm  Pulses: Intact distal pulses  Heart sounds: Normal heart sounds  Pulmonary:      Effort: Pulmonary effort is normal       Breath sounds: Normal breath sounds  No wheezing  Chest:      Chest wall: No tenderness  Abdominal:      General: Bowel sounds are normal       Palpations: Abdomen is soft  Musculoskeletal:         General: Normal range of motion  Cervical back: Neck supple  Right lower leg: No edema  Left lower leg: No edema  Lymphadenopathy:      Cervical: No cervical adenopathy  Skin:     General: Skin is warm  Findings: No rash  Neurological:      Mental Status: She is alert     Psychiatric:         Mood and Affect: Mood and affect normal          Behavior: Behavior normal          Vital Signs  ED Triage Vitals   Temperature Pulse Respirations Blood Pressure SpO2   11/19/22 1204 11/19/22 1204 11/19/22 1204 11/19/22 1204 11/19/22 1204   98 4 °F (36 9 °C) 70 18 162/65 94 %      Temp Source Heart Rate Source Patient Position - Orthostatic VS BP Location FiO2 (%)   11/19/22 1204 11/19/22 1204 11/19/22 1204 11/19/22 1204 --   Oral Monitor Sitting Right leg       Pain Score       11/19/22 1345       6           Vitals:    11/19/22 1415 11/19/22 1613 11/19/22 1715 11/19/22 1748   BP: 126/60 132/59 135/61 147/63   Pulse: 60 60 58 62   Patient Position - Orthostatic VS: Lying Lying Lying Lying         Visual Acuity      ED Medications  Medications   HYDROmorphone (DILAUDID) injection 0 5 mg (0 5 mg Intravenous Given 11/19/22 1345)   iodixanol (VISIPAQUE) 320 MG/ML injection 85 mL (85 mL Intravenous Given 11/19/22 1559)       Diagnostic Studies  Results Reviewed     Procedure Component Value Units Date/Time    VAGINOSIS DNA PROBE (AFFIRM) [[id number]] Collected: 11/19/22 1742    Lab Status: In process Specimen: Genital from Vaginal Updated: 11/19/22 1759    HS Troponin I 2hr [[id number]]  (Normal) Collected: 11/19/22 1611    Lab Status: Final result Specimen: Blood from Arm, Left Updated: 11/19/22 1645     hs TnI 2hr 8 ng/L      Delta 2hr hsTnI -1 ng/L     Protime-INR [[id number]]  (Abnormal) Collected: 11/19/22 1430    Lab Status: Final result Specimen: Blood from Arm, Right Updated: 11/19/22 1457     Protime 30 2 seconds      INR 2 91    APTT [[id number]]  (Abnormal) Collected: 11/19/22 1430    Lab Status: Final result Specimen: Blood from Arm, Right Updated: 11/19/22 1457     PTT 43 seconds     D-Dimer [[id number]]  (Abnormal) Collected: 11/19/22 1430    Lab Status: Final result Specimen: Blood from Arm, Right Updated: 11/19/22 1457     D-Dimer, Quant 0 58 ug/ml FEU     Narrative: In the evaluation for possible pulmonary embolism, in the appropriate (Well's Score of 4 or less) patient, the age adjusted d-dimer cutoff for this patient can be calculated as:    Age x 0 01 (in ug/mL) for Age-adjusted D-dimer exclusion threshold for a patient over 50 years      HS Troponin 0hr (reflex protocol) [[id number]]  (Normal) Collected: 11/19/22 1348    Lab Status: Final result Specimen: Blood from Arm, Right Updated: 11/19/22 1437     hs TnI 0hr 9 ng/L     Comprehensive metabolic panel [[id number]]  (Abnormal) Collected: 11/19/22 6908    Lab Status: Final result Specimen: Blood from Arm, Right Updated: 11/19/22 1421     Sodium 142 mmol/L      Potassium 4 3 mmol/L      Chloride 104 mmol/L      CO2 36 mmol/L      ANION GAP 2 mmol/L      BUN 25 mg/dL      Creatinine 3 20 mg/dL      Glucose 185 mg/dL      Calcium 9 1 mg/dL      Corrected Calcium 9 9 mg/dL      AST --     ALT 18 U/L      Alkaline Phosphatase 123 U/L      Total Protein 6 4 g/dL      Albumin 3 0 g/dL      Total Bilirubin 0 34 mg/dL      eGFR 13 ml/min/1 73sq m     Narrative:      National Kidney Disease Foundation guidelines for Chronic Kidney Disease (CKD):   •  Stage 1 with normal or high GFR (GFR > 90 mL/min/1 73 square meters)  •  Stage 2 Mild CKD (GFR = 60-89 mL/min/1 73 square meters)  •  Stage 3A Moderate CKD (GFR = 45-59 mL/min/1 73 square meters)  •  Stage 3B Moderate CKD (GFR = 30-44 mL/min/1 73 square meters)  •  Stage 4 Severe CKD (GFR = 15-29 mL/min/1 73 square meters)  •  Stage 5 End Stage CKD (GFR <15 mL/min/1 73 square meters)  Note: GFR calculation is accurate only with a steady state creatinine    CBC and differential [[id number]]  (Abnormal) Collected: 11/19/22 1348    Lab Status: Final result Specimen: Blood from Arm, Right Updated: 11/19/22 1356     WBC 5 03 Thousand/uL      RBC 3 55 Million/uL      Hemoglobin 10 8 g/dL      Hematocrit 34 4 %      MCV 97 fL      MCH 30 4 pg      MCHC 31 4 g/dL      RDW 14 8 %      MPV 10 4 fL      Platelets 578 Thousands/uL      nRBC 0 /100 WBCs      Neutrophils Relative 65 %      Immat GRANS % 0 %      Lymphocytes Relative 25 %      Monocytes Relative 7 %      Eosinophils Relative 3 %      Basophils Relative 0 %      Neutrophils Absolute 3 27 Thousands/µL      Immature Grans Absolute 0 01 Thousand/uL      Lymphocytes Absolute 1 23 Thousands/µL      Monocytes Absolute 0 34 Thousand/µL      Eosinophils Absolute 0 16 Thousand/µL      Basophils Absolute 0 02 Thousands/µL                  CT chest abdomen pelvis w contrast   Final Result by Ryan Fajardo MD (11/19 1703)   1  Persistent moderate left pleural effusion, mildly decreased in size since the prior CT  Left basilar atelectasis  2  No acute intra-abdominal pathology  Workstation performed: [id number]         XR chest 2 views   ED Interpretation by Daria Dial PA-C (11/19 1427)   Small left pleural effusion - stable from sept  Final Result by Alexandra Castillo MD (11/20 1040)      Persistent though decreasing left pleural effusion  No acute cardiopulmonary process  Workstation performed: [id number]                    Procedures  Procedures         ED Course  ED Course as of 11/20/22 1225   Sat Nov 19, 2022   1503 Age adjusted D dimmer is negative  HEART Risk Score    Flowsheet Row Most Recent Value   Heart Score Risk Calculator    History 0 Filed at: 11/19/2022 1538   ECG 1 Filed at: 11/19/2022 1538   Age 2 Filed at: 11/19/2022 1538   Risk Factors 1 Filed at: 11/19/2022 1538   Troponin 0 Filed at: 11/19/2022 1538   HEART Score 4 Filed at: 11/19/2022 1538                        SBIRT 20yo+    Flowsheet Row Most Recent Value   SBIRT (23 yo +)    In order to provide better care to our patients, we are screening all of our patients for alcohol and drug use  Would it be okay to ask you these screening questions?  No Filed at: 11/19/2022 1318          Wells' Criteria for PE    Flowsheet Row Most Recent Value   Wells' Criteria for PE    Clinical signs and symptoms of DVT 0 Filed at: 11/19/2022 1428   PE is primary diagnosis or equally likely 3 Filed at: 11/19/2022 1428   HR >100 0 Filed at: 11/19/2022 1428   Immobilization at least 3 days or Surgery in the previous 4 weeks 0 Filed at: 11/19/2022 1336   Previous, objectively diagnosed PE or DVT 1 5 Filed at: 11/19/2022 1428   Hemoptysis 0 Filed at: 11/19/2022 1428   Malignancy with treatment within 6 months or palliative 1 Filed at: 11/19/2022 1428   Kemal' Criteria Total 5 5 Filed at: 11/19/2022 1428                [facility]  Number of Diagnoses or Management Options  Atelectasis  Elevated troponin: new and requires workup  Pleural effusion: new and requires workup  Shortness of breath: new and requires workup  Vaginal discharge: new and requires workup  Diagnosis management comments: Will get d dimmer with hx of DVT - eval for PE for the episode of SOB - D dimmer is negative with age adjusted  Will get CT to eval her abdominal pain and dysuria  Amount and/or Complexity of Data Reviewed  Clinical lab tests: reviewed  Tests in the radiology section of CPT®: reviewed  Decide to obtain previous medical records or to obtain history from someone other than the patient: yes  Review and summarize past medical records: yes  Discuss the patient with other providers: yes (Dr Lynch Aw)  Independent visualization of images, tracings, or specimens: yes    Risk of Complications, Morbidity, and/or Mortality  General comments: Signed out to [facility] awaiting CT results  And delta troponin       Patient Progress  Patient progress: improved      Disposition  Final diagnoses:   Pleural effusion   Vaginal discharge   Atelectasis   Shortness of breath   Elevated troponin     Time reflects when diagnosis was documented in both MDM as applicable and the Disposition within this note     Time User Action Codes Description Comment    11/19/2022  5:06 PM Glenora Sero Add [J90] Pleural effusion     11/19/2022  5:06 PM Glenora Sero Add [N89 8] Vaginal discharge     11/19/2022  5:08 PM Bk Pino [D78 61] Atelectasis     11/19/2022  5:08 PM Glenora Sero Add [R06 02] Shortness of breath     11/19/2022  5:08 PM Glenora Sero Modify [J90] Pleural effusion     11/19/2022  5:08 PM Glenora Sero Modify [R06 02] Shortness of breath     11/19/2022  5:24 PM Glenora Sero Add [R77 8] Elevated troponin       ED Disposition     ED Disposition   Discharge    Condition   Stable    Date/Time   Sat Nov 19, 2022  5:23 PM    Comment   Sherman Patterson discharge to home/self care                Follow-up Information     Follow up With Specialties Details Why Contact Info Additional Information    [address] [facility]   [facility] [address]  [address], [address], [place], [place], [address] [phone]          Discharge Medication List as of 11/19/2022  5:23 PM      CONTINUE these medications which have NOT CHANGED    Details   acetaminophen (TYLENOL) 500 mg tablet Take 500 mg by mouth every 6 (six) hours as needed, Starting Fri 3/18/2022, Historical Med      atorvastatin (LIPITOR) 40 mg tablet Take 1 tablet by mouth every evening, Starting Sun 11/28/2021, Historical Med      clopidogrel (PLAVIX) 75 mg tablet Take by mouth, Historical Med      Diclofenac Sodium (VOLTAREN) 1 % Apply 2 g topically daily  Indications: Joint Damage causing Pain and Loss of Function, Historical Med      epoetin patricia (Procrit) 2,000 units/mL Historical Med      HYDROmorphone (DILAUDID) 2 mg tablet Take 2 mg by mouth every 4 (four) hours as needed for moderate pain, Starting Thu 9/8/2022, Historical Med      hydrOXYzine HCL (ATARAX) 25 mg tablet Take 25 mg by mouth Three times daily as needed, Starting Wed 4/13/2022, Historical Med      letrozole [facility]) 2 5 mg tablet Take 2 5 mg by mouth in the morning, Starting Mon 11/29/2021, Historical Med      levETIRAcetam (KEPPRA) 100 mg/mL oral solution 5mL by mouth every 12 hours  On dialysis days only, take an additional 2 5mL after dialysis  , Normal      LORazepam (ATIVAN) 0 5 mg tablet Take 0 5 mg by mouth every 8 (eight) hours as needed, Starting Thu 9/8/2022, Historical Med      melatonin 3 mg Take 3 mg by mouth daily at bedtime, Starting Fri 3/18/2022, Until Sat 3/18/2023, Historical Med      metoprolol succinate (TOPROL-XL) 25 mg 24 hr tablet Take 25 mg by mouth 2 (two) times a day   Indications: High Blood Pressure Disorder, Historical Med      Multiple Vitamins-Minerals (ProRenal + D) TABS Take 1 tablet by mouth every evening   Indications: RENAL FAILURE, CHRONIC, Historical Med      nitroglycerin (NITROSTAT) 0 4 mg SL tablet Place 0 4 mg under the tongue, Starting Fri 4/22/2022, Historical Med      ondansetron (ZOFRAN) 4 mg tablet Take 4 mg by mouth every 8 (eight) hours as needed for nausea or vomiting, Historical Med      pantoprazole (PROTONIX) 20 mg tablet Take 1 tablet by mouth daily pt takes prilosec 20mg daily , Historical Med      Polyethylene Glycol 3350 (MIRALAX PO) Take by mouth, Historical Med      senna-docusate sodium (SENOKOT S) 8 6-50 mg per tablet Take 1 tablet by mouth daily as needed, Starting Wed 2/23/2022, Until Thu 2/23/2023 at 2359, Historical Med      simethicone (MYLICON) 80 mg chewable tablet Chew 1 tab po once daily, Normal      warfarin (COUMADIN) 5 mg tablet Take 1 tablet (5 mg total) by mouth daily, Starting Thu 9/15/2022, Normal                 PDMP Review       Value Time User    PDMP Reviewed  Yes 11/3/2022 11:01 AM Princess Garnica, [facility] Provider  Electronically Signed by           Joaquin Garnica PA-C  11/20/22 5476

## 2022-11-19 NOTE — ED CARE HANDOFF
Emergency Department Sign Out Note        Sign out and transfer of care from [facility] [place] PA-C  See Separate Emergency Department note  The patient, Michael Allison, was evaluated by the previous provider for SOB and vaginal discharge  "Pt presents to the ED for an episode of SOB - was brief in nature and resolved spontaneously - daughter reports she had pleural effusions that needed to be tapped last time she was SOB like this  No CP  And now feels better  Has been having some fluid - thick - coming likely from urethra - pt doesn't make urine - never bothered her but now when she passes it - a few x a day - has pain and states it burns and reports abdominal pain  No fevers or vomiting  Normal BM today  Pt is on dialysis M/W/F - receiving regularly  On coumadin - hx of DVT in arm  Lives at home with family - daughter helps to care for her  "    Workup Completed:  PT/INR, APTT, D-dimer, trop, CMP, CBC  CXR  CT chest abd pelvis     ED Course / Workup Pending (followup):     HEART Risk Score    Flowsheet Row Most Recent Value   Heart Score Risk Calculator    History 0 Filed at: 11/19/2022 1538   ECG 1 Filed at: 11/19/2022 1538   Age 2 Filed at: 11/19/2022 1538   Risk Factors 1 Filed at: 11/19/2022 1538   Troponin 0 Filed at: 11/19/2022 1538   HEART Score 4 Filed at: 11/19/2022 1538            Wells' Criteria for PE    Flowsheet Row Most Recent Value   Wells' Criteria for PE    Clinical signs and symptoms of DVT 0 Filed at: 11/19/2022 1428   PE is primary diagnosis or equally likely 3 Filed at: 11/19/2022 1428   HR >100 0 Filed at: 11/19/2022 1428   Immobilization at least 3 days or Surgery in the previous 4 weeks 0 Filed at: 11/19/2022 1336   Previous, objectively diagnosed PE or DVT 1 5 Filed at: 11/19/2022 1428   Hemoptysis 0 Filed at: 11/19/2022 1428   Malignancy with treatment within 6 months or palliative 1 Filed at: 11/19/2022 1428   Wells' Criteria Total 5 5 Filed at: 11/19/2022 1428 ED Course as of 11/19/22 1732   Sat Nov 19, 2022   1511 S/o from Lorraine Garcia PA-C: on HD doesn't make urine; came in with 1 episode of SOB- CXR showed small pleural effusion on left  Vaginal discharge, pain and dysuria- green thick mucous when pt has a BM, waiting on CT scan  D-dimer was performed due to history of blood clots, age adjusted in negative  If needs something for pain can have Dilaudid    1540 hs TnI 0hr: 9  Will require delta trop   1646 Delta 2hr hsTnI: -1  Given pt never had complaint of chest pain, ECG has remained stable- will d/c    1704 CT chest abdomen pelvis w contrast  1  Persistent moderate left pleural effusion, mildly decreased in size since the prior CT  Left basilar atelectasis  2  No acute intra-abdominal pathology  Procedures  MDM  Number of Diagnoses or Management Options  Atelectasis  Elevated troponin  Pleural effusion  Shortness of breath  Vaginal discharge  Diagnosis management comments: [de-identified] female was evaluated by the previous provider  See previous provider's note for full HPI  At time of sign-out repeat troponin and CT were pending  Discussed results with the daughter  Will perform vaginosis panel  Plan to discharge patient home  Given elevated troponin, will place referral to Cardiology  I have discussed the plan to discharge pt from ED  The patient was discharged in stable condition   Patient ambulated off the department   Extensive return to emergency department precautions were discussed   Follow up with appropriate providers including primary care physician was discussed   Patient and/or their  primary decision maker expressed understanding  Hector Hutchinson remained stable during entire emergency department stay  Portions of the record may have been created with voice recognition software  Occasional wrong word or "sound a like" substitutions may have occurred due to the inherent limitations of voice recognition software   Read the chart carefully and recognize, using context, where substitutions have occurred  Amount and/or Complexity of Data Reviewed  Clinical lab tests: reviewed  Tests in the radiology section of CPT®: reviewed  Decide to obtain previous medical records or to obtain history from someone other than the patient: yes  Review and summarize past medical records: yes    Patient Progress  Patient progress: stable          Disposition  Final diagnoses:   Pleural effusion   Vaginal discharge   Atelectasis   Shortness of breath   Elevated troponin     Time reflects when diagnosis was documented in both MDM as applicable and the Disposition within this note     Time User Action Codes Description Comment    11/19/2022  5:06 PM Marie [place] Add [J90] Pleural effusion     11/19/2022  5:06 PM Marie [place] Add [N89 8] Vaginal discharge     11/19/2022  5:08 PM Marie [place] Add [U46 26] Atelectasis     11/19/2022  5:08 PM Marie [place] Add [R06 02] Shortness of breath     11/19/2022  5:08 PM Marie [place] Modify [J90] Pleural effusion     11/19/2022  5:08 PM Rigo Shy [R06 02] Shortness of breath     11/19/2022  5:24 PM Marie [place] Add [R77 8] Elevated troponin       ED Disposition     ED Disposition   Discharge    Condition   Stable    Date/Time   Sat Nov 19, 2022  5:23 PM    Comment   Kita Patterson discharge to home/self care                 Follow-up Information     Follow up With Specialties Details Why Contact Info Additional Information    [address] [facility]   [facility] [address]  [address] [place] [address] Cardiology [address], [address] [phone]        Patient's Medications   Discharge Prescriptions    No medications on file            ED Provider  Electronically Signed by     Earnest Kerr PA-C  11/19/22 6389

## 2022-11-20 LAB
ATRIAL RATE: 59 BPM
ATRIAL RATE: 61 BPM
P AXIS: 74 DEGREES
P AXIS: 78 DEGREES
PR INTERVAL: 180 MS
PR INTERVAL: 190 MS
QRS AXIS: -26 DEGREES
QRS AXIS: -27 DEGREES
QRSD INTERVAL: 96 MS
QRSD INTERVAL: 98 MS
QT INTERVAL: 406 MS
QT INTERVAL: 414 MS
QTC INTERVAL: 408 MS
QTC INTERVAL: 409 MS
T WAVE AXIS: 32 DEGREES
T WAVE AXIS: 37 DEGREES
VENTRICULAR RATE: 59 BPM
VENTRICULAR RATE: 61 BPM

## 2022-11-21 LAB
CANDIDA RRNA VAG QL PROBE: NEGATIVE
G VAGINALIS RRNA GENITAL QL PROBE: NEGATIVE
T VAGINALIS RRNA GENITAL QL PROBE: NEGATIVE

## 2022-12-05 ENCOUNTER — OFFICE VISIT (OUTPATIENT)
Dept: UROLOGY | Facility: AMBULATORY SURGERY CENTER | Age: 82
End: 2022-12-05

## 2022-12-05 VITALS — SYSTOLIC BLOOD PRESSURE: 128 MMHG | OXYGEN SATURATION: 98 % | HEART RATE: 64 BPM | DIASTOLIC BLOOD PRESSURE: 70 MMHG

## 2022-12-05 DIAGNOSIS — N89.8 VAGINAL DISCHARGE: Primary | ICD-10-CM

## 2022-12-05 DIAGNOSIS — R39.89 BLADDER PAIN: ICD-10-CM

## 2022-12-05 RX ORDER — LINACLOTIDE 72 UG/1
1 CAPSULE, GELATIN COATED ORAL DAILY
COMMUNITY
Start: 2022-11-28

## 2022-12-05 RX ORDER — NYSTATIN 100000 [USP'U]/G
POWDER TOPICAL
COMMUNITY
Start: 2022-09-09

## 2022-12-05 RX ORDER — ONDANSETRON 4 MG/1
TABLET, ORALLY DISINTEGRATING ORAL
COMMUNITY
Start: 2022-11-26

## 2022-12-05 RX ORDER — OMEPRAZOLE 20 MG/1
20 CAPSULE, DELAYED RELEASE ORAL DAILY
COMMUNITY
Start: 2022-11-25

## 2022-12-05 NOTE — PATIENT INSTRUCTIONS
Cystoscopy   AMBULATORY CARE:   A cystoscopy  is a procedure to look inside your urethra and bladder using a cystoscope  A cystoscope is a small tube with a light and magnifying camera on the end  The procedure is used to diagnose and treat conditions of the bladder, urethra, or prostate  Your healthcare provider may also do a ureteroscopy during a cystoscopy  A ureteroscopy is a procedure to look inside your ureters and kidneys  Prepare for your cystoscopy: Your healthcare provider will talk to you about how to prepare for your procedure  He or she will tell you what medicines to take and not to take on the day of your procedure  You may need to stop taking medicines such as anticoagulants, aspirin, and ibuprofen several days before your procedure  Your provider may tell you stop eating after midnight the night before your procedure  You may be asked to drink a large amount of liquids before your procedure  Arrange for a ride home after your procedure  You will not be allowed to drive yourself home  During your cystoscopy: You may be given general anesthesia to keep you asleep and pain free during your procedure  Your healthcare provider may instead use local anesthesia  It is put into your urethra and bladder  You will not feel pain, but you may be able to feel some pressure during your procedure  With local anesthesia, you may feel burning or need to urinate when the cystoscope is put in and removed  If you are female, you will be placed on your back and your feet may be placed in stirrups  If you are male, you will be placed on your back or in a sitting position  The cystoscope will be placed through your urethra and into your bladder  The urologist will look at the walls of your urethra as the scope goes through to your bladder  Your bladder will be filled with liquid so your urologist can see the inside of your bladder more clearly   Tools may be inserted through the cystoscope to treat any problems in your urethra or bladder  Your provider may also take a sample of tissue and send it to a lab for tests  After your cystoscopy:  After you are fully awake, you will go home  You may see small amounts of blood in your urine  This is normal  It is also normal to have an increased need to urinate  You may also have burning or mild discomfort in your bladder or kidney area when you urinate  If you had general anesthesia, it may take at least 24 hours before you feel like your usual self  Risks of a cystoscopy: You may bleed more than expected or develop an infection  Your urethra, bladder, or ureters may get damaged during your procedure  You may have abdominal pain  Swelling caused by the cystoscopy may cause a blockage or slow urine flow  Seek care immediately if:   Your urine turns from pink to red, or you have clots in your urine  You cannot urinate and your bladder feels full  You have severe pain  Contact your healthcare provider or urologist if:   Your pain or burning during urination becomes worse or lasts longer than 1 day  Your urine stays pink for longer than 1 day  You have a fever and chills  You urinate less than usual, or still feel like you have to urinate after you use the bathroom  You have questions or concerns about your condition or care  Medicines: You may  be given any of the following:  Antibiotics  help treat or prevent a bacterial infection  Acetaminophen  decreases pain and fever  It is available without a doctor's order  Ask how much to take and how often to take it  Follow directions  Read the labels of all other medicines you are using to see if they also contain acetaminophen, or ask your doctor or pharmacist  Acetaminophen can cause liver damage if not taken correctly  Do not use more than 4 grams (4,000 milligrams) total of acetaminophen in one day  Take your medicine as directed    Contact your healthcare provider if you think your medicine is not helping or if you have side effects  Tell him or her if you are allergic to any medicine  Keep a list of the medicines, vitamins, and herbs you take  Include the amounts, and when and why you take them  Bring the list or the pill bottles to follow-up visits  Carry your medicine list with you in case of an emergency  Self-care:   Drink liquids as directed  Your healthcare provider may recommend that you drink 6 to 8 eight-ounce cups of water every day for 2 days after your procedure  Apply a warm, damp washcloth over your urethral opening  This may help to relieve discomfort  Ask when you can return to regular daily activities  Your healthcare provider may recommend that you rest after your procedure  Do not have sex  until your healthcare provider tells you it is okay  Sex may increase your risk for a urinary tract infection  Follow up with your healthcare provider as directed:  Write down your questions so you remember to ask them during your visits  © Copyright [employer] 2022 Information is for End User's use only and may not be sold, redistributed or otherwise used for commercial purposes  All illustrations and images included in CareNotes® are the copyrighted property of [employer]  or Stephanie Corral   The above information is an  only  It is not intended as medical advice for individual conditions or treatments  Talk to your doctor, nurse or pharmacist before following any medical regimen to see if it is safe and effective for you

## 2022-12-05 NOTE — PROGRESS NOTES
12/5/2022    Emir Patterson  1940  [id number]        Assessment  -Urethral/vaginal discharge    Discussion/Plan  Karma Powell is a [de-identified] y o  female who presents in consultation     1  Urethral/vaginal discharge- reviewed pictures of milky white discharge which is unclear if etiology is vaginal or urethral   Despite patient's significant comorbidities and difficulties mobilizing, daughter wishes to pursue all testing to rule out any urologic cause  We discussed that her recent CT scan was unremarkable and patient anuric which could be cause for discharge  Patient and daughter verbalize understanding, but due to patient's persistent pain and discomfort, she wishes to pursue further testing  We also discussed referral to Gynecology given her extensive gynecologic malignancy history  Informed consent for cystoscopy was reviewed  Plan to follow-up with MD for cystoscopy to further evaluate bladder  Patient's daughter was advised to call sooner with any questions or issues     -All questions answered, patient and daughter agree with plan     History of Present Illness  [de-identified] y o  female who presents in consultation today for evaluation of bladder pain  Patient resides with her daughter, as primary caretaker and historian  Complex medical history includes ESRD on HD 3days/week for the last 7 years, breast, ovarian, uterine, and skin cancer  Patient is anuric, but has been experiencing a discharge correlated with having a bowel movement since May 2022  Daughter presents with pictures of discharge which appears white and milky  It is unsure whether this is vaginal or urethral in origin  She states she was evaluated by Gynecology during hospitalization in the spring, but no examination or imaging was performed  Recent vaginal probe was negative  Daughter states that over the last 2 months she has been experiencing severe pain in vaginal urethral area when she is passing this discharge    Patient is primarily bed bound, she has difficulties ambulating and is on narcotics for chronic pain  Daughter states she underwent left nephrectomy remotely, and had an episode of cystitis when she was in her 25s  She otherwise denies any prior urologic history, surgical intervention, or instrumentation  Patient continues to follow with Nephrology  She has not seen Gynecology in many years  Review of Systems  Review of Systems   Constitutional: Negative  HENT: Negative  Respiratory: Negative  Cardiovascular: Negative  Gastrointestinal: Negative  Genitourinary: Positive for difficulty urinating (anuric), dysuria, pelvic pain, vaginal discharge and vaginal pain  Negative for decreased urine volume, flank pain, frequency, hematuria, urgency and vaginal bleeding  Musculoskeletal: Negative  Skin: Negative  Neurological: Negative  Psychiatric/Behavioral: Negative  Past Medical History  Past Medical History:   Diagnosis Date   • [facility]    • Hypertension    • Ovarian cancer ([facility] )    • Renal disorder    • Skin cancer        Past Social History  Past Surgical History:   Procedure Laterality Date   • BREAST SURGERY     •  SECTION     • EXPLORATORY LAPAROTOMY     • GALLBLADDER SURGERY     • HYSTERECTOMY     • IR THORACENTESIS  2022       Past Family History  No family history on file      Past Social history  Social History     Socioeconomic History   • Marital status: Single     Spouse name: Not on file   • Number of children: Not on file   • Years of education: Not on file   • Highest education level: Not on file   Occupational History   • Not on file   Tobacco Use   • Smoking status: Never   • Smokeless tobacco: Never   Substance and Sexual Activity   • Alcohol use: Not Currently   • Drug use: Never   • Sexual activity: Not on file   Other Topics Concern   • Not on file   Social History Narrative   • Not on file     Social Determinants of Health     Financial Resource Strain: Not on file   Food Insecurity: No Food Insecurity   • Worried About Running Out of Food in the Last Year: Never true   • Ran Out of Food in the Last Year: Never true   Transportation Needs: No Transportation Needs   • Lack of Transportation (Medical): No   • Lack of Transportation (Non-Medical): No   Physical Activity: Not on file   Stress: Not on file   Social Connections: Not on file   Intimate Partner Violence: Not on file   Housing Stability: Low Risk    • Unable to Pay for Housing in the Last Year: No   • Number of Places Lived in the Last Year: 1   • Unstable Housing in the Last Year: No       Current Medications  Current Outpatient Medications   Medication Sig Dispense Refill   • acetaminophen (TYLENOL) 500 mg tablet Take 500 mg by mouth every 6 (six) hours as needed     • atorvastatin (LIPITOR) 40 mg tablet Take 1 tablet by mouth every evening     • clopidogrel (PLAVIX) 75 mg tablet Take by mouth (Patient not taking: Reported on 11/8/2022)     • Diclofenac Sodium (VOLTAREN) 1 % Apply 2 g topically daily  Indications: Joint Damage causing Pain and Loss of Function     • epoetin patricia (Procrit) 2,000 units/mL      • HYDROmorphone (DILAUDID) 2 mg tablet Take 2 mg by mouth every 4 (four) hours as needed for moderate pain     • hydrOXYzine HCL (ATARAX) 25 mg tablet Take 25 mg by mouth Three times daily as needed     • letrozole (FEMARA) 2 5 mg tablet Take 2 5 mg by mouth in the morning     • levETIRAcetam (KEPPRA) 100 mg/mL oral solution 5mL by mouth every 12 hours  On dialysis days only, take an additional 2 5mL after dialysis  473 mL 3   • LORazepam (ATIVAN) 0 5 mg tablet Take 0 5 mg by mouth every 8 (eight) hours as needed     • melatonin 3 mg Take 3 mg by mouth daily at bedtime     • metoprolol succinate (TOPROL-XL) 25 mg 24 hr tablet Take 25 mg by mouth 2 (two) times a day  Indications: High Blood Pressure Disorder     • Multiple Vitamins-Minerals (ProRenal + D) TABS Take 1 tablet by mouth every evening  Indications: RENAL FAILURE, CHRONIC     • nitroglycerin (NITROSTAT) 0 4 mg SL tablet Place 0 4 mg under the tongue     • ondansetron (ZOFRAN) 4 mg tablet Take 4 mg by mouth every 8 (eight) hours as needed for nausea or vomiting     • pantoprazole (PROTONIX) 20 mg tablet Take 1 tablet by mouth daily pt takes prilosec 20mg daily      • Polyethylene Glycol 3350 (MIRALAX PO) Take by mouth     • senna-docusate sodium (SENOKOT S) 8 6-50 mg per tablet Take 1 tablet by mouth daily as needed     • simethicone (MYLICON) 80 mg chewable tablet Chew 1 tab po once daily 90 tablet 3   • warfarin (COUMADIN) 5 mg tablet Take 1 tablet (5 mg total) by mouth daily 30 tablet 0     No current facility-administered medications for this visit  Allergies  Allergies   Allergen Reactions   • Morphine Anaphylaxis     Hallucinations and hives per daughter  anaphylaxis     • Ace Inhibitors Other (See Comments)     dialysis     • Codeine GI Intolerance     "heart racing"  hear racing     • Iodine - Food Allergy Other (See Comments)     Must avoid due to kidneys and thyroid     • Nsaids Other (See Comments)   • Other Other (See Comments)     dialysis   • Tramadol Hives     Sleepiness per daughter who reports "sensitivity"         Past medical history, social history, family history, medications and allergies were reviewed  Vitals  There were no vitals filed for this visit  Physical Exam  Physical Exam  Constitutional:       Appearance: Normal appearance  She is well-developed  HENT:      Head: Normocephalic  Eyes:      Pupils: Pupils are equal, round, and reactive to light  Pulmonary:      Effort: Pulmonary effort is normal    Abdominal:      Palpations: Abdomen is soft  Musculoskeletal:         General: Normal range of motion  Cervical back: Normal range of motion  Comments: Wheelchair bound, patient in pain, lying on side for entire office visit   Skin:     General: Skin is warm and dry     Neurological: General: No focal deficit present  Mental Status: She is alert and oriented to person, place, and time  Psychiatric:         Mood and Affect: Mood normal          Behavior: Behavior normal          Thought Content: Thought content normal          Judgment: Judgment normal          Results    I have personally reviewed all pertinent lab results and reviewed with patient  Lab Results   Component Value Date    CALCIUM 9 1 11/19/2022    K 4 3 11/19/2022    CO2 36 (H) 11/19/2022     11/19/2022    BUN 25 11/19/2022    CREATININE 3 20 (H) 11/19/2022     Lab Results   Component Value Date    WBC 5 03 11/19/2022    HGB 10 8 (L) 11/19/2022    HCT 34 4 (L) 11/19/2022    MCV 97 11/19/2022     11/19/2022     No results found for this or any previous visit (from the past 1 hour(s))

## 2022-12-13 ENCOUNTER — CONSULT (OUTPATIENT)
Dept: CARDIOLOGY CLINIC | Facility: CLINIC | Age: 82
End: 2022-12-13

## 2022-12-13 VITALS — WEIGHT: 132.28 LBS | HEART RATE: 68 BPM | BODY MASS INDEX: 22.71 KG/M2

## 2022-12-13 DIAGNOSIS — J90 PLEURAL EFFUSION: Primary | ICD-10-CM

## 2022-12-13 DIAGNOSIS — I50.33 ACUTE ON CHRONIC DIASTOLIC HEART FAILURE (HCC): ICD-10-CM

## 2022-12-13 DIAGNOSIS — I48.20 CHRONIC ATRIAL FIBRILLATION (HCC): ICD-10-CM

## 2022-12-13 DIAGNOSIS — I25.10 CORONARY ARTERY DISEASE INVOLVING NATIVE HEART WITHOUT ANGINA PECTORIS, UNSPECIFIED VESSEL OR LESION TYPE: ICD-10-CM

## 2022-12-13 RX ORDER — AMLODIPINE BESYLATE AND BENAZEPRIL HYDROCHLORIDE 2.5; 1 MG/1; MG/1
1 CAPSULE ORAL 2 TIMES DAILY
Qty: 30 CAPSULE | Refills: 0 | Status: CANCELLED | OUTPATIENT
Start: 2022-12-13

## 2022-12-13 RX ORDER — AMLODIPINE BESYLATE AND BENAZEPRIL HYDROCHLORIDE 2.5; 1 MG/1; MG/1
1 CAPSULE ORAL DAILY
COMMUNITY

## 2022-12-13 RX ORDER — AMLODIPINE BESYLATE AND BENAZEPRIL HYDROCHLORIDE 2.5; 1 MG/1; MG/1
1 CAPSULE ORAL DAILY
Qty: 90 CAPSULE | Refills: 3 | Status: CANCELLED | OUTPATIENT
Start: 2022-12-13

## 2022-12-13 NOTE — TELEPHONE ENCOUNTER
Hi , Pt daughter called wanting to know why mother's medication *Amlodpine has been discontinued?      Please advise

## 2022-12-13 NOTE — TELEPHONE ENCOUNTER
pts daughter called in again stating her mom takes 2 5 amlodipine twice daily and she needs a script for her

## 2022-12-13 NOTE — PROGRESS NOTES
Cardiology             Mckenzie Patterson  1940  [id number]              Assessment/Plan:    Paroxysmal atrial fibrillation  Left arm DVT, on warfarin anticoagulation  End-stage renal disease, on hemodialysis  Dyslipidemia  Diabetes  Right renal artery stenting  Left pleural effusion      Regular rhythm on examination  Prior ECG reviewed, sinus bradycardia with first-degree AV block noted  Okay to continue metoprolol  Patient requesting refill on amlodipine  On her last she seems to be on amlodipine/benazepril but the patient's daughter has a bottle for amlodipine only  I requested that she contact her mother's nephrologist and ask for guidance if she should be on amlodipine/benazepril or amlodipine only  Patient's daughter wishes to continue following Coumadin clinic at [facility]   Will refer to patient to pulmonary medicine to continue monitoring persistent moderate left pleural effusion      Follow-up in 6 months        Interval History: This is an [de-identified] female with a history of breast cancer status post bilateral mastectomies, end-stage renal disease on hemodialysis, diabetes, dyslipidemia, right renal artery stenting, left nephrectomy 2003 who has followed [facility] cardiology  She also has a history of left arm DVT and has been on Eliquis in the past   Prior echocardiogram/4/21 revealed a small LV cavity with mild concentric LVH Holter monitor 4/6/2021 demonstrated predominant sinus rhythm with no evidence of atrial fibrillation  She was last seen by [facility] cardiology for telemedicine visit 4/2022  She was hospitalized 6/2022 for acute encephalopathy secondary to possible seizure  She was noted to have paroxysmal atrial fibrillation at that time  She was hospitalized at [place] [id number] 9/2022 for acute on chronic left arm DVT  He was initiated on at that time, she was off Eliquis, therefore was initiated on heparin transition to warfarin      Prior echocardiogram 6/7/2022 demonstrates ejection fraction 58% with aortic valve sclerosis, moderate mitral annular calcification  She presents today to establish cardiovascular care with our group  From a symptomatic standpoint her daughter states she has been feeling well  She complains of shortness of breath anytime she is in significant pain  Otherwise she does not have shortness of breath  She has had pleural effusions in the past with prior CT Chest 11/19/2022 revealing moderate left pleural effusion which was persistent  She continues to follow [facility] Coumadin clinic  Vitals:  Vitals:    12/13/22 1628   Pulse: 68   Weight: 60 kg (132 lb 4 4 oz)         Past Medical History:   Diagnosis Date   • Breast cancer (HCC)    • Hypertension    • [facility] ([facility] )    • Renal disorder    • Skin cancer      Social History     Socioeconomic History   • Marital status: Single     Spouse name: Not on file   • Number of children: Not on file   • Years of education: Not on file   • Highest education level: Not on file   Occupational History   • Not on file   Tobacco Use   • Smoking status: Never   • Smokeless tobacco: Never   Substance and Sexual Activity   • Alcohol use: Not Currently   • Drug use: Never   • Sexual activity: Not on file   Other Topics Concern   • Not on file   Social History Narrative   • Not on file     Social Determinants of Health     Financial Resource Strain: Not on file   Food Insecurity: No Food Insecurity   • Worried About Running Out of Food in the Last Year: Never true   • Ran Out of Food in the Last Year: Never true   Transportation Needs: No Transportation Needs   • Lack of Transportation (Medical): No   • Lack of Transportation (Non-Medical):  No   Physical Activity: Not on file   Stress: Not on file   Social Connections: Not on file   Intimate Partner Violence: Not on file   Housing Stability: Low Risk    • Unable to Pay for Housing in the Last Year: No   • Number of Places Lived in the Last Year: 1   • Unstable Housing in the Last Year: No      No family history on file  Past Surgical History:   Procedure Laterality Date   • BREAST SURGERY     •  SECTION     • EXPLORATORY LAPAROTOMY     • GALLBLADDER SURGERY     • HYSTERECTOMY     • IR THORACENTESIS  2022       Current Outpatient Medications:   •  acetaminophen (TYLENOL) 500 mg tablet, Take 500 mg by mouth every 6 (six) hours as needed, Disp: , Rfl:   •  amLODIPine-benazepril (LOTREL 2 5-10) 2 5-10 MG per capsule, Take 1 capsule by mouth daily, Disp: , Rfl:   •  atorvastatin (LIPITOR) 40 mg tablet, Take 1 tablet by mouth every evening, Disp: , Rfl:   •  Diclofenac Sodium (VOLTAREN) 1 %, Apply 2 g topically daily  Indications: Joint Damage causing Pain and Loss of Function, Disp: , Rfl:   •  epoetin patricia (Procrit) 2,000 units/mL, , Disp: , Rfl:   •  HYDROmorphone (DILAUDID) 2 mg tablet, Take 2 mg by mouth every 4 (four) hours as needed for moderate pain, Disp: , Rfl:   •  hydrOXYzine HCL (ATARAX) 25 mg tablet, Take 25 mg by mouth Three times daily as needed, Disp: , Rfl:   •  letrozole (FEMARA) 2 5 mg tablet, Take 2 5 mg by mouth in the morning, Disp: , Rfl:   •  levETIRAcetam (KEPPRA) 100 mg/mL oral solution, 5mL by mouth every 12 hours  On dialysis days only, take an additional 2 5mL after dialysis  , Disp: 473 mL, Rfl: 3  •  Linzess 72 MCG CAPS, Take 1 capsule by mouth daily, Disp: , Rfl:   •  metoprolol succinate (TOPROL-XL) 25 mg 24 hr tablet, Take 25 mg by mouth 2 (two) times a day  Indications: High Blood Pressure Disorder, Disp: , Rfl:   •  Multiple Vitamins-Minerals (ProRenal + D) TABS, Take 1 tablet by mouth every evening   Indications: RENAL FAILURE, CHRONIC, Disp: , Rfl:   •  nystatin powder, APPLY TO AFFECTED AREA(S) TWICE A DAY, Disp: , Rfl:   •  omeprazole (PriLOSEC) 20 mg delayed release capsule, Take 20 mg by mouth daily, Disp: , Rfl:   •  ondansetron (ZOFRAN) 4 mg tablet, Take 4 mg by mouth every 8 (eight) hours as needed for nausea or vomiting, Disp: , Rfl:   •  pantoprazole (PROTONIX) 20 mg tablet, Take 1 tablet by mouth daily pt takes prilosec 20mg daily , Disp: , Rfl:   •  Polyethylene Glycol 3350 (MIRALAX PO), Take by mouth, Disp: , Rfl:   •  senna-docusate sodium (SENOKOT S) 8 6-50 mg per tablet, Take 1 tablet by mouth daily as needed, Disp: , Rfl:   •  simethicone (MYLICON) 80 mg chewable tablet, Chew 1 tab po once daily, Disp: 90 tablet, Rfl: 3  •  warfarin (COUMADIN) 5 mg tablet, Take 1 tablet (5 mg total) by mouth daily, Disp: 30 tablet, Rfl: 0  •  clopidogrel (PLAVIX) 75 mg tablet, Take by mouth (Patient not taking: Reported on 11/8/2022), Disp: , Rfl:   •  LORazepam (ATIVAN) 0 5 mg tablet, Take 0 5 mg by mouth every 8 (eight) hours as needed (Patient not taking: Reported on 12/13/2022), Disp: , Rfl:   •  melatonin 3 mg, Take 3 mg by mouth daily at bedtime (Patient not taking: Reported on 12/13/2022), Disp: , Rfl:   •  nitroglycerin (NITROSTAT) 0 4 mg SL tablet, Place 0 4 mg under the tongue (Patient not taking: Reported on 12/13/2022), Disp: , Rfl:   •  ondansetron (ZOFRAN-ODT) 4 mg disintegrating tablet, TAKE 1 TABLET BY MOUTH EVERY 8 HOURS AS NEEDED FOR NAUSEA OR VOMITING, Disp: , Rfl:         Review of Systems:  Review of Systems   Constitutional: Negative for activity change, fever and unexpected weight change  HENT: Negative for facial swelling, nosebleeds and voice change  Respiratory: Positive for shortness of breath  Negative for chest tightness and wheezing  Cardiovascular: Negative for chest pain, palpitations and leg swelling  Gastrointestinal: Negative for abdominal distention  Genitourinary: Negative for hematuria  Musculoskeletal: Negative for arthralgias  Skin: Negative for color change, pallor, rash and wound  Neurological: Negative for dizziness, seizures and syncope  Psychiatric/Behavioral: Negative for agitation  Physical Exam:  Physical Exam  Vitals reviewed  Constitutional:       Appearance: She is well-developed  HENT:      Head: Normocephalic and atraumatic  Cardiovascular:      Rate and Rhythm: Normal rate and regular rhythm  Heart sounds: Normal heart sounds  Pulmonary:      Effort: Pulmonary effort is normal       Breath sounds: Normal breath sounds  Abdominal:      Palpations: Abdomen is soft  Musculoskeletal:         General: Normal range of motion  Cervical back: Normal range of motion and neck supple  Skin:     General: Skin is warm and dry  Neurological:      Mental Status: She is alert and oriented to person, place, and time  Psychiatric:         Behavior: Behavior normal          Thought Content: Thought content normal          Judgment: Judgment normal          This note was completed in part utilizing [employer] Direct Software  Grammatical errors, random word insertions, spelling mistakes, and incomplete sentences can be an occasional consequence of this system secondary to software limitations, ambient noise, and hardware issues  If you have any questions or concerns about the content, text, or information contained within the body of this dictation, please contact the provider for clarification

## 2022-12-13 NOTE — TELEPHONE ENCOUNTER
I personally called to speak with pt's dtr stevan at [phone] as per pt communication  She thinks that pt is taking norvasc 2 5 mg po bid  But our chart lists lotrel 2 5 /10 mg once daily  I told her I would check with the pharmacy to see what pt has been getting filled and would send in appropriate rx  (Note - it does not appear above med was filled by our office in past as pt is relatively new)  The pharmacist does not see norvasc or lotrel listed as being filled there at their pharmacy  I then returned call to pt's dtr again - at [phone]  And she confirmed that a rx was given to patient in 4/2022 for norvasc 2 5 mg tabs - take 4 tabs/day  But pt has been taking 2 5 mg po bid (excpet taking just once daily M-W-F)  I advised her to contact nephrologist to refill  As pt even had a recent hospitalization in 11/2022 and did not have TCM  I discussed that norvasc is not listed on that discharge med list either  And since pt goes for dialysis - she should discuss meds with nephrologist to ensure patient safety  dtr will reach out to nephro and then back to me if needed

## 2022-12-14 ENCOUNTER — TELEPHONE (OUTPATIENT)
Dept: PULMONOLOGY | Facility: CLINIC | Age: 82
End: 2022-12-14

## 2022-12-14 NOTE — TELEPHONE ENCOUNTER
Left a voicemail for patient to schedule a consultation with the Pulmonary doctor per referral placed by Cardiologist

## 2022-12-15 ENCOUNTER — OFFICE VISIT (OUTPATIENT)
Dept: OBGYN CLINIC | Facility: MEDICAL CENTER | Age: 82
End: 2022-12-15

## 2022-12-15 VITALS — BODY MASS INDEX: 22.88 KG/M2 | HEIGHT: 64 IN | WEIGHT: 134 LBS

## 2022-12-15 DIAGNOSIS — Z12.72 SCREENING FOR VAGINAL CANCER: ICD-10-CM

## 2022-12-15 DIAGNOSIS — N89.8 VAGINAL DISCHARGE: Primary | ICD-10-CM

## 2022-12-15 NOTE — PROGRESS NOTES
[facility] [address] [place] [address]    Assessment/Plan:  No problem-specific Assessment & Plan notes found for this encounter  Diagnoses and all orders for this visit:    Vaginal discharge  -     Ambulatory Referral to Gynecology  -     Liquid-based pap, screening  -     Genital Comprehensive Culture    Screening for vaginal cancer  -     Liquid-based pap, screening    - negative wet mount  - no indication of fistula  - culture and cytology sent      Subjective:   Kyara Robert is a [de-identified] y o  [id number] female  CC: discharge    HPI: Daughter is here with Guerda Johnson  Daughter states that she is care provider for Kamjerald Elizabeth and is also translating during the visit  States that she is having discharge since March and was seen by urology x 2 at which time she was told that it was possibly from bladder and just a residual  States that she has a cystoscopy scheduled for January  States that her mom notes pain when sitting to have a bowel movement or just random pain  States that there is a tan thinner discharge  States that when she was in the hospital a swab was done but no treatment  2003 had ovarian, uterine cancer  She had breast cancer and is taking Femara  Takes medication to control bowel function  CT scan on 11/19/2022 normal  She also has appointment with gastro in January  ROS: Review of Systems   Reason unable to perform ROS: Patient's daughter answering questions  She is primary care provider  Constitutional: Negative for chills and fever  Gastrointestinal: Negative for abdominal pain, constipation and diarrhea  Genitourinary: Negative for vaginal bleeding and vaginal pain         PFSH: The following portions of the patient's history were reviewed and updated as appropriate: allergies, current medications, past family history, past medical history, obstetric history, gynecologic history, past social history, past surgical history and problem list        Objective:  Ht 5' 4" (1 626 m)   Wt 60 8 kg (134 lb)   BMI 23 00 kg/m²    Physical Exam  Vitals reviewed  Constitutional:       Appearance: She is not diaphoretic  Abdominal:      General: Abdomen is flat  Palpations: Abdomen is soft  Genitourinary:     General: Normal vulva  Exam position: Lithotomy position  Labia:         Right: No rash, tenderness or lesion  Left: No rash, tenderness or lesion  Urethra: No prolapse, urethral swelling or urethral lesion  Vagina: No vaginal discharge, erythema, tenderness, bleeding or lesions  Comments: Thin Radha speculum place with some discomfort  Vaginal tissue friable, when touched with q-tip  There is no vaginal discharge noted, no evidence of fistula  No fluid entering vagina with cough or valsalva  Wet mount- no discharge on Q-tip small amount of blood from doing cytology  Wet mount- negative, whiff test negative  Cytology completed- vaginal wall/cuff area, which triggered small amount of bleeding  Vaginal culture sent

## 2022-12-16 ENCOUNTER — CONSULT (OUTPATIENT)
Dept: PULMONOLOGY | Facility: CLINIC | Age: 82
End: 2022-12-16

## 2022-12-16 VITALS
WEIGHT: 132.28 LBS | TEMPERATURE: 97.7 F | HEIGHT: 64 IN | BODY MASS INDEX: 22.58 KG/M2 | SYSTOLIC BLOOD PRESSURE: 122 MMHG | DIASTOLIC BLOOD PRESSURE: 78 MMHG | OXYGEN SATURATION: 92 % | HEART RATE: 68 BPM

## 2022-12-16 DIAGNOSIS — J90 PLEURAL EFFUSION: ICD-10-CM

## 2022-12-16 NOTE — PROGRESS NOTES
Pulmonary Consultation   Lillie Juan José [de-identified] y o  female MRN: [id number]  12/16/2022      Assessment:  Recurrent pleural effusion  · S/p thoracentesis in 3/2022, fluid analysis consistent with transudate  · Cytology per care everywhere reports rare atypical cells marked MOC 31, indeterminant for malignancy  · Had a recurrence bilateral effusion with respiratory distress syndrome for which she underwent bilateral thoracentesis at [facility]  · Since then, no recurrence of symptoms however multiple CT chest/chest x-ray showed persistent mild to moderate pleural effusion on the left    Plan:  · Overall seems to be transudate from ESRD/CHF possible nutritional deficiency now with hypoalbuminemia  · However, given the decreased appetite/weight loss and history of breast cancer could be malignancy  · Had a shared decision-making today with the patient, her daughter  · For now, she agrees to undergo at least diagnostic thoracentesis to send for cytology/other analysis  · We will schedule outpatient thoracentesis, will need to hold warfarin 3 days prior      Return in about 6 months (around 6/16/2023)  History of Present Illness     New Consult for: Pleural effusion      Background  [de-identified] y o  female with a h/o DM2, seizure disorder, osteoarthritis, DVT, gout, acid reflux, ESRD, CAD, chronic A  fib, chronic low back pain, chronic opioid dependence, breast cancer s/p bilateral mastectomies, chronic diastolic CHF  Patient presents today with her daughter who is interpreting for her  States that she is known to have recurrent pleural effusion, underwent thoracenteses twice  First in March, removed about 360 cc from the left side at [facility]  Fluid analysis consistent with transudate by protein ratio fluid protein 3 3/serum protein was 7 0 at that time  LDH 70  Leukos 170  Cytology was indeterminant for malignancy, noted rare atypical cells that were negative for MOC      In 6/2022, had recurrent shortness of breath/dyspnea and underwent bilateral thoracenteses however no fluid analysis available  She has a history of remote breast cancer in , underwent bilateral mastectomy and radiation therapy  Currently on hormonal therapy letrozole/states that she has a lesion in her sternum  Patient reports no recurrence of shortness of breath, respiratory distress since last thoracentesis  Traphill monitoring her fluid intake, gets HD Monday/Wednesday/Friday  However, reported slowly declining weight, worse appetite over the past several months  Review of Systems  As per hpi, all other systems reviewed and were negative  Studies:  Imaging and other studies: I have personally reviewed pertinent films in PACS    CT chest 2022-moderate pleural effusion on the left  Mildly decreased in size compared to prior imaging  Left basilar atelectasis/lingular atelectasis/scarring  Pulmonary function testing:   None on file    EKG, Pathology, and Other Studies: I have personally reviewed pertinent reports  "CYTOLOGIC DIAGNOSIS:   A  Thoracentesis Fluid with Cell Block   Adequacy: Adequate for evaluation  General Category:  Indeterminate for Diagnostic Malignancy  Descriptive Interpretation: Rare atypical cells present, see comment  Reactive mesothelial cells are present       Comment: The atypical cells were rare in number and were present   mostly in the ThinPrep material   No cells marked with MOC31 and   BRST2, D2-40 highlighted mesothelial cells    "    Historical Information   Past Medical History:   Diagnosis Date   • Breast cancer ([facility] [address] )    • Hypertension    • Ovarian cancer ([facility] [address] )    • Renal disorder    • Skin cancer      Past Surgical History:   Procedure Laterality Date   • BREAST SURGERY     •  SECTION     • EXPLORATORY LAPAROTOMY     • GALLBLADDER SURGERY     • HYSTERECTOMY     • IR THORACENTESIS  2022     Family History   Problem Relation Age of Onset   • Pancreatic cancer Mother    • Colon cancer Father          Medications/Allergies: Reviewed    Vitals: Blood pressure 122/78, pulse 68, temperature 97 7 °F (36 5 °C), temperature source Tympanic, height 5' 4" (1 626 m), weight 60 kg (132 lb 4 4 oz), SpO2 92 %  Body mass index is 22 71 kg/m²  Oxygen Therapy  SpO2: 92 %  Oxygen Therapy: None (Room air)      Physical Exam  Body mass index is 22 71 kg/m²  Gen: not in acute distress, thin  Neck/Eyes: supple, moderate/severe thoracic kyphosis, PERRL  Ear: normal appearance, no significant hearing impairment  Nose:  normal nasal mucosa, no drainage  Mouth:  unremarkable/normal appearance of lips, teeth and gums  Oropharynx: mucosa is moist, no focal lesions or erythema  Salivary glands: soft nontender  Chest: normal respiratory efforts, diminished breath sounds on the left base  CV: RRR, no murmurs appreciated, no edema  Abdomen: soft, non tender  Extremities:  No observed deformity   Skin: unremarkable  Neuro: AAO X3, no focal motor deficit         Labs:  Lab Results   Component Value Date    WBC 5 03 11/19/2022    HGB 10 8 (L) 11/19/2022    HCT 34 4 (L) 11/19/2022    MCV 97 11/19/2022     11/19/2022     Lab Results   Component Value Date    CALCIUM 9 1 11/19/2022    K 4 3 11/19/2022    CO2 36 (H) 11/19/2022     11/19/2022    BUN 25 11/19/2022    CREATININE 3 20 (H) 11/19/2022     No results found for: IGE  Lab Results   Component Value Date    ALT 18 11/19/2022    AST  11/19/2022      Comment:      Unable to Perform   Specimen collection should occur prior to Sulfasalazine administration due to the potential for falsely depressed results  ALKPHOS 123 (H) 11/19/2022           Portions of the record may have been created with voice recognition software  Occasional wrong word or "sound a like" substitutions may have occurred due to the inherent limitations of voice recognition software    Read the chart carefully and recognize, using context, where substitutions have occurred    Chaitanya Jackson TIERRA [facility]

## 2022-12-18 LAB — BACTERIA GENITAL AEROBE CULT: ABNORMAL

## 2022-12-19 ENCOUNTER — PREP FOR PROCEDURE (OUTPATIENT)
Dept: PULMONOLOGY | Facility: CLINIC | Age: 82
End: 2022-12-19

## 2022-12-19 ENCOUNTER — TELEPHONE (OUTPATIENT)
Dept: PULMONOLOGY | Facility: CLINIC | Age: 82
End: 2022-12-19

## 2022-12-19 DIAGNOSIS — N76.0: Primary | ICD-10-CM

## 2022-12-19 DIAGNOSIS — J90 PLEURAL EFFUSION: Primary | ICD-10-CM

## 2022-12-19 LAB — BACTERIA GENITAL AEROBE CULT: ABNORMAL

## 2022-12-19 RX ORDER — AMOXICILLIN AND CLAVULANATE POTASSIUM 875; 125 MG/1; MG/1
1 TABLET, FILM COATED ORAL EVERY 12 HOURS SCHEDULED
Qty: 14 TABLET | Refills: 0 | Status: SHIPPED | OUTPATIENT
Start: 2022-12-19 | End: 2022-12-26

## 2022-12-19 NOTE — TELEPHONE ENCOUNTER
lvm for pt's daughter to call my direct ext back to go over vickey appt on 12/23/22 in [place] with Dr Kelvin Delgadillo

## 2022-12-20 ENCOUNTER — TELEPHONE (OUTPATIENT)
Dept: PULMONOLOGY | Facility: CLINIC | Age: 82
End: 2022-12-20

## 2022-12-20 NOTE — TELEPHONE ENCOUNTER
Patient's daughter returned call on 12/19/2022  Advised her of the thoro appt for 12/23/2022 at [facility]  Advised her that the patient will need to hold blood thinners starting 12/20/2022         She will get a call the night before going over the arrival time/

## 2022-12-22 ENCOUNTER — TELEPHONE (OUTPATIENT)
Dept: UROLOGY | Facility: MEDICAL CENTER | Age: 82
End: 2022-12-22

## 2022-12-22 NOTE — TELEPHONE ENCOUNTER
Called Paula Burgess back - not sure what she needs to know about Cysto that is scheduled in January for her mother

## 2022-12-22 NOTE — TELEPHONE ENCOUNTER
Pt's daughter called asking to speak with a nurse regarding Cysto on 1/30/2023    Please advise      Lydia Mcdowell can be reached at [phone]

## 2022-12-23 ENCOUNTER — TELEPHONE (OUTPATIENT)
Dept: PULMONOLOGY | Facility: CLINIC | Age: 82
End: 2022-12-23

## 2022-12-23 LAB
LAB AP GYN PRIMARY INTERPRETATION: NORMAL
Lab: NORMAL

## 2022-12-23 NOTE — TELEPHONE ENCOUNTER
Shanelle from coumadin clinic [phone] called asking if the patient should stop warfain coumadin for a thorocentesis on 01/04/23 if so when should they stop and when should the resume

## 2022-12-23 NOTE — TELEPHONE ENCOUNTER
Patient is taking coumadin and they need to know if she needs to stop it prior to Cysto in the office - they need to know if she needs to stop coumadin and start heparin    She is having a cysto in the office for urethral/vaginal discharge

## 2022-12-23 NOTE — TELEPHONE ENCOUNTER
Called iVvian back and notified her that her mom does not need to stop coumadin prior to Cysto as it is an office procedure not a surgical procedure

## 2022-12-27 NOTE — TELEPHONE ENCOUNTER
Paperwork that was received from [facility] anticoagulation management was faxed to [id number] office so that Giovanny Knox can get it to Dr Dewayne Castillo to fill out and send back to [facility] STAT

## 2022-12-28 ENCOUNTER — HOSPITAL ENCOUNTER (OUTPATIENT)
Dept: RADIOLOGY | Facility: CLINIC | Age: 82
Discharge: HOME | End: 2022-12-28
Admitting: ANESTHESIOLOGY

## 2022-12-28 VITALS
TEMPERATURE: 98.8 F | RESPIRATION RATE: 18 BRPM | DIASTOLIC BLOOD PRESSURE: 82 MMHG | OXYGEN SATURATION: 93 % | SYSTOLIC BLOOD PRESSURE: 150 MMHG | HEART RATE: 69 BPM

## 2022-12-28 DIAGNOSIS — M16.12 OSTEOARTHRITIS OF LEFT HIP: ICD-10-CM

## 2022-12-28 RX ORDER — BUPIVACAINE HCL/PF 2.5 MG/ML
30 VIAL (ML) INJECTION ONCE
Status: COMPLETED | OUTPATIENT
Start: 2022-12-28 | End: 2022-12-28

## 2022-12-28 RX ORDER — METHYLPREDNISOLONE ACETATE 40 MG/ML
40 INJECTION, SUSPENSION INTRA-ARTICULAR; INTRALESIONAL; INTRAMUSCULAR; PARENTERAL; SOFT TISSUE ONCE
Status: COMPLETED | OUTPATIENT
Start: 2022-12-28 | End: 2022-12-28

## 2022-12-28 RX ORDER — LIDOCAINE HYDROCHLORIDE 10 MG/ML
5 INJECTION, SOLUTION EPIDURAL; INFILTRATION; INTRACAUDAL; PERINEURAL ONCE
Status: COMPLETED | OUTPATIENT
Start: 2022-12-28 | End: 2022-12-28

## 2022-12-28 RX ADMIN — LIDOCAINE HYDROCHLORIDE 3 ML: 10 INJECTION, SOLUTION EPIDURAL; INFILTRATION; INTRACAUDAL; PERINEURAL at 12:01

## 2022-12-28 RX ADMIN — BUPIVACAINE HYDROCHLORIDE 3 ML: 2.5 INJECTION, SOLUTION EPIDURAL; INFILTRATION; INTRACAUDAL at 12:02

## 2022-12-28 RX ADMIN — METHYLPREDNISOLONE ACETATE 40 MG: 40 INJECTION, SUSPENSION INTRA-ARTICULAR; INTRALESIONAL; INTRAMUSCULAR; SOFT TISSUE at 12:02

## 2022-12-28 RX ADMIN — IOHEXOL 1 ML: 300 INJECTION, SOLUTION INTRAVENOUS at 12:01

## 2022-12-28 NOTE — H&P
History of Present Illness: The patient is a [de-identified] y o  female who presents with complaints of left hip pain  Past Medical History:   Diagnosis Date   • Breast cancer ([facility] [address] )    • Hypertension    • Ovarian cancer ([facility] [address] )    • Renal disorder    • Skin cancer        Past Surgical History:   Procedure Laterality Date   • BREAST SURGERY     •  SECTION     • EXPLORATORY LAPAROTOMY     • GALLBLADDER SURGERY     • HYSTERECTOMY     • IR THORACENTESIS  2022         Current Outpatient Medications:   •  acetaminophen (TYLENOL) 500 mg tablet, Take 500 mg by mouth every 6 (six) hours as needed, Disp: , Rfl:   •  amLODIPine-benazepril (LOTREL 2 5-10) 2 5-10 MG per capsule, Take 1 capsule by mouth daily, Disp: , Rfl:   •  atorvastatin (LIPITOR) 40 mg tablet, Take 1 tablet by mouth every evening, Disp: , Rfl:   •  clopidogrel (PLAVIX) 75 mg tablet, Take by mouth (Patient not taking: Reported on 2022), Disp: , Rfl:   •  Diclofenac Sodium (VOLTAREN) 1 %, Apply 2 g topically daily  Indications: Joint Damage causing Pain and Loss of Function, Disp: , Rfl:   •  epoetin patricia (Procrit) 2,000 units/mL, , Disp: , Rfl:   •  HYDROmorphone (DILAUDID) 2 mg tablet, Take 2 mg by mouth every 4 (four) hours as needed for moderate pain, Disp: , Rfl:   •  hydrOXYzine HCL (ATARAX) 25 mg tablet, Take 25 mg by mouth Three times daily as needed, Disp: , Rfl:   •  letrozole (FEMARA) 2 5 mg tablet, Take 2 5 mg by mouth in the morning, Disp: , Rfl:   •  levETIRAcetam (KEPPRA) 100 mg/mL oral solution, 5mL by mouth every 12 hours  On dialysis days only, take an additional 2 5mL after dialysis  , Disp: 473 mL, Rfl: 3  •  Linzess 72 MCG CAPS, Take 1 capsule by mouth daily, Disp: , Rfl:   •  LORazepam (ATIVAN) 0 5 mg tablet, Take 0 5 mg by mouth every 8 (eight) hours as needed (Patient not taking: Reported on 2022), Disp: , Rfl:   •  melatonin 3 mg, Take 3 mg by mouth daily at bedtime (Patient not taking: Reported on 2022), Disp: , Rfl: •  metoprolol succinate (TOPROL-XL) 25 mg 24 hr tablet, Take 25 mg by mouth 2 (two) times a day  Indications: High Blood Pressure Disorder, Disp: , Rfl:   •  Multiple Vitamins-Minerals (ProRenal + D) TABS, Take 1 tablet by mouth every evening  Indications: RENAL FAILURE, CHRONIC, Disp: , Rfl:   •  nitroglycerin (NITROSTAT) 0 4 mg SL tablet, Place 0 4 mg under the tongue (Patient not taking: Reported on 12/13/2022), Disp: , Rfl:   •  nystatin powder, APPLY TO AFFECTED AREA(S) TWICE A DAY, Disp: , Rfl:   •  omeprazole (PriLOSEC) 20 mg delayed release capsule, Take 20 mg by mouth daily (Patient not taking: Reported on 12/15/2022), Disp: , Rfl:   •  ondansetron (ZOFRAN) 4 mg tablet, Take 4 mg by mouth every 8 (eight) hours as needed for nausea or vomiting, Disp: , Rfl:   •  ondansetron (ZOFRAN-ODT) 4 mg disintegrating tablet, TAKE 1 TABLET BY MOUTH EVERY 8 HOURS AS NEEDED FOR NAUSEA OR VOMITING, Disp: , Rfl:   •  pantoprazole (PROTONIX) 20 mg tablet, Take 1 tablet by mouth daily pt takes prilosec 20mg daily , Disp: , Rfl:   •  Polyethylene Glycol 3350 (MIRALAX PO), Take by mouth, Disp: , Rfl:   •  senna-docusate sodium (SENOKOT S) 8 6-50 mg per tablet, Take 1 tablet by mouth daily as needed, Disp: , Rfl:   •  simethicone (MYLICON) 80 mg chewable tablet, Chew 1 tab po once daily, Disp: 90 tablet, Rfl: 3  •  warfarin (COUMADIN) 5 mg tablet, Take 1 tablet (5 mg total) by mouth daily, Disp: 30 tablet, Rfl: 0  No current facility-administered medications for this encounter      Allergies   Allergen Reactions   • Morphine Anaphylaxis     Hallucinations and hives per daughter  anaphylaxis     • Ace Inhibitors Other (See Comments)     dialysis     • Codeine GI Intolerance     "heart racing"  hear racing     • Iodine - Food Allergy Other (See Comments)     Must avoid due to kidneys and thyroid     • Nsaids Other (See Comments)   • Other Other (See Comments)     dialysis   • Tramadol Hives     Sleepiness per daughter who reports "sensitivity"         Physical Exam:   Vitals:    12/28/22 1206   BP: 150/82   Pulse: 69   Resp: 18   Temp:    SpO2: 93%     General: Awake, Alert, Oriented x 3, Mood and affect appropriate  Respiratory: Respirations even and unlabored  Cardiovascular: Peripheral pulses intact; no edema  Musculoskeletal Exam: Painful internal and external range of motion of left hip    ASA Score: 4    Patient/Chart Verification  Patient ID Verified: Verbal  ID Band Applied: No  Consents Confirmed: Procedural  H&P( within 30 days) Verified: To be obtained in the Pre-Procedure area  Allergies Reviewed: Yes  Anticoag/NSAID held?: No (coumdain not held [address] [place] [address] informed)  Currently on antibiotics?: No    Assessment:   1   Osteoarthritis of left hip        Plan: Left intra-articular hip injection

## 2022-12-28 NOTE — DISCHARGE INSTR - LAB
Do not apply heat to any area that is numb  If you have discomfort or soreness at the injection site, you may apply ice today, 20 minutes on and 20 minutes off  Tomorrow you may use ice or warm, moist heat  Do not apply ice or heat directly to the skin  If you experience severe shortness of breath, go to the Emergency Room  You may have numbness for several hours from the local anesthetic  Please use caution and common sense, especially with weight-bearing activities  You may have an increase or change in the discomfort for 36-48 hours after your treatment  Apply ice and continue with any pain medicine you have been prescribed  Do not do anything strenuous today  You may shower, but no tub baths or hot tubs today  You may resume your normal activities tomorrow, but do not “overdo it”  Resume normal activities slowly when you are feeling better  If you experience redness, drainage or swelling at the injection site, or if you develop a fever above 100 degrees, please call [facility] at [phone] or go to the Emergency Room  Continue to take all routine medicines prescribed by your primary care physician unless otherwise instructed by our staff  Most blood thinners should be started again according to your regularly scheduled dosing  If you have any questions, please give our office a call  As no general anesthesia was used in today's procedure, you should not experience any side effects related to anesthesia  If you have a problem specifically related to your procedure, please call our office at [phone]  Problems not related to your procedure should be directed to your primary care physician

## 2022-12-29 ENCOUNTER — TELEPHONE (OUTPATIENT)
Dept: FAMILY MEDICINE CLINIC | Facility: CLINIC | Age: 82
End: 2022-12-29

## 2022-12-29 NOTE — TELEPHONE ENCOUNTER
Called Poppy back  She advised that Dr Nicole Tripp ok'd 3 day hold for A-Fib part, but needs our permission to hold for DVT part

## 2022-12-29 NOTE — TELEPHONE ENCOUNTER
[id number] called in and will fax a form over to get your permission to hold Devin Danielle for three days prior to surgery

## 2022-12-29 NOTE — TELEPHONE ENCOUNTER
OK to hold coumadin X 3 days prior to procedure  Please inform pt/family - that there is still a risk of recurrent DVT or PE  As well as stroke from a fib even if off anti-coag for a short time

## 2023-01-01 ENCOUNTER — TELEPHONE (OUTPATIENT)
Dept: PAIN MEDICINE | Facility: CLINIC | Age: 83
End: 2023-01-01

## 2023-01-04 ENCOUNTER — TELEPHONE (OUTPATIENT)
Dept: PAIN MEDICINE | Facility: CLINIC | Age: 83
End: 2023-01-04

## 2023-01-04 ENCOUNTER — TELEPHONE (OUTPATIENT)
Dept: UROLOGY | Facility: AMBULATORY SURGERY CENTER | Age: 83
End: 2023-01-04

## 2023-01-04 ENCOUNTER — HOSPITAL ENCOUNTER (OUTPATIENT)
Dept: RADIOLOGY | Facility: HOSPITAL | Age: 83
Discharge: HOME | End: 2023-01-04
Attending: INTERNAL MEDICINE | Admitting: RADIOLOGY

## 2023-01-04 VITALS
HEART RATE: 70 BPM | DIASTOLIC BLOOD PRESSURE: 69 MMHG | SYSTOLIC BLOOD PRESSURE: 152 MMHG | RESPIRATION RATE: 16 BRPM | OXYGEN SATURATION: 98 %

## 2023-01-04 DIAGNOSIS — J90 PLEURAL EFFUSION: ICD-10-CM

## 2023-01-04 LAB
APPEARANCE FLD: CLEAR
COLOR FLD: YELLOW
GLUCOSE FLD-MCNC: 150 MG/DL
HISTIOCYTES NFR FLD: 14 %
LDH FLD L TO P-CCNC: 62 U/L
LYMPHOCYTES NFR BLD AUTO: 47 %
MONO+MESO NFR FLD MANUAL: 1 %
MONOCYTES NFR BLD AUTO: 17 %
NEUTS SEG NFR BLD AUTO: 21 %
PH BODY FLUID: 7.7
PROT FLD-MCNC: 3.5 G/DL
SITE: NORMAL
TOTAL CELLS COUNTED SPEC: 100
WBC # FLD MANUAL: 615 /UL

## 2023-01-04 RX ORDER — LIDOCAINE HYDROCHLORIDE 10 MG/ML
INJECTION, SOLUTION EPIDURAL; INFILTRATION; INTRACAUDAL; PERINEURAL AS NEEDED
Status: COMPLETED | OUTPATIENT
Start: 2023-01-04 | End: 2023-01-04

## 2023-01-04 RX ADMIN — LIDOCAINE HYDROCHLORIDE 10 ML: 10 INJECTION, SOLUTION EPIDURAL; INFILTRATION; INTRACAUDAL; PERINEURAL at 08:45

## 2023-01-04 NOTE — TELEPHONE ENCOUNTER
Pt should ask the urologist as I dont know if they did any biopsies    They should give the recommendation  Thanks

## 2023-01-04 NOTE — TELEPHONE ENCOUNTER
Patient hade Cystoscopy done today , when she can restart Coumadin today or Tomorrow? Please advised

## 2023-01-04 NOTE — BRIEF OP NOTE (RAD/CATH)
IR THORACENTESIS Procedure Note    PATIENT NAME: Therese Og  : 1940  MRN: [id number]    Pre-op Diagnosis:   1  Pleural effusion      Post-op Diagnosis:   1   Pleural effusion        Provider:   Tanvi Ackerman PA-C    Estimated Blood Loss: none    Findings: L thoracentesis, 240 cc clear yellow    Specimens: sent    Complications:  None immediate    Anesthesia: local    Tanvi Ackerman PA-C     Date: 2023  Time: 8:56 AM

## 2023-01-04 NOTE — TELEPHONE ENCOUNTER
Patient's family asking to move up cystoscopy  Patient was originally seen in our [place] office    Not sure if there is any sooner appointments or what the reason is for moving up the cystoscopy

## 2023-01-05 ENCOUNTER — OFFICE VISIT (OUTPATIENT)
Dept: FAMILY MEDICINE CLINIC | Facility: CLINIC | Age: 83
End: 2023-01-05

## 2023-01-05 VITALS
HEART RATE: 69 BPM | BODY MASS INDEX: 23.1 KG/M2 | OXYGEN SATURATION: 96 % | WEIGHT: 134.6 LBS | TEMPERATURE: 98 F | RESPIRATION RATE: 16 BRPM

## 2023-01-05 DIAGNOSIS — E11.22 TYPE 2 DIABETES MELLITUS WITH CHRONIC KIDNEY DISEASE ON CHRONIC DIALYSIS, WITH LONG-TERM CURRENT USE OF INSULIN (HCC): Primary | ICD-10-CM

## 2023-01-05 DIAGNOSIS — E78.2 MIXED HYPERLIPIDEMIA: ICD-10-CM

## 2023-01-05 DIAGNOSIS — N18.6 ESRD ON DIALYSIS (HCC): ICD-10-CM

## 2023-01-05 DIAGNOSIS — I50.33 ACUTE ON CHRONIC DIASTOLIC HEART FAILURE (HCC): ICD-10-CM

## 2023-01-05 DIAGNOSIS — D50.0 IRON DEFICIENCY ANEMIA DUE TO CHRONIC BLOOD LOSS: ICD-10-CM

## 2023-01-05 DIAGNOSIS — M81.0 AGE-RELATED OSTEOPOROSIS WITHOUT CURRENT PATHOLOGICAL FRACTURE: ICD-10-CM

## 2023-01-05 DIAGNOSIS — F11.20 CONTINUOUS OPIOID DEPENDENCE (HCC): ICD-10-CM

## 2023-01-05 DIAGNOSIS — N18.6 TYPE 2 DIABETES MELLITUS WITH CHRONIC KIDNEY DISEASE ON CHRONIC DIALYSIS, WITH LONG-TERM CURRENT USE OF INSULIN (HCC): Primary | ICD-10-CM

## 2023-01-05 DIAGNOSIS — K21.9 GASTROESOPHAGEAL REFLUX DISEASE, UNSPECIFIED WHETHER ESOPHAGITIS PRESENT: ICD-10-CM

## 2023-01-05 DIAGNOSIS — E44.0 MODERATE PROTEIN-CALORIE MALNUTRITION (HCC): ICD-10-CM

## 2023-01-05 DIAGNOSIS — J90 PLEURAL EFFUSION: ICD-10-CM

## 2023-01-05 DIAGNOSIS — Z79.4 TYPE 2 DIABETES MELLITUS WITH CHRONIC KIDNEY DISEASE ON CHRONIC DIALYSIS, WITH LONG-TERM CURRENT USE OF INSULIN (HCC): Primary | ICD-10-CM

## 2023-01-05 DIAGNOSIS — C79.89 CHEST WALL RECURRENCE OF LEFT BREAST CANCER (HCC): ICD-10-CM

## 2023-01-05 DIAGNOSIS — Z78.0 POST-MENOPAUSAL: ICD-10-CM

## 2023-01-05 DIAGNOSIS — C50.919 MALIGNANT NEOPLASM OF FEMALE BREAST, UNSPECIFIED ESTROGEN RECEPTOR STATUS, UNSPECIFIED LATERALITY, UNSPECIFIED SITE OF BREAST (HCC): ICD-10-CM

## 2023-01-05 DIAGNOSIS — C50.912 CHEST WALL RECURRENCE OF LEFT BREAST CANCER (HCC): ICD-10-CM

## 2023-01-05 DIAGNOSIS — Z99.2 TYPE 2 DIABETES MELLITUS WITH CHRONIC KIDNEY DISEASE ON CHRONIC DIALYSIS, WITH LONG-TERM CURRENT USE OF INSULIN (HCC): Primary | ICD-10-CM

## 2023-01-05 DIAGNOSIS — I48.20 CHRONIC ATRIAL FIBRILLATION (HCC): ICD-10-CM

## 2023-01-05 DIAGNOSIS — M46.1 SACROILIITIS (HCC): ICD-10-CM

## 2023-01-05 DIAGNOSIS — Z99.11 DEPENDENCE ON RESPIRATOR (VENTILATOR) STATUS (HCC): ICD-10-CM

## 2023-01-05 DIAGNOSIS — Z86.718 HISTORY OF DVT (DEEP VEIN THROMBOSIS): ICD-10-CM

## 2023-01-05 DIAGNOSIS — Z99.2 ESRD ON DIALYSIS (HCC): ICD-10-CM

## 2023-01-05 DIAGNOSIS — E21.3 HYPERPARATHYROIDISM, UNSPECIFIED (HCC): ICD-10-CM

## 2023-01-05 NOTE — PATIENT INSTRUCTIONS
Please clarify if eleta taking amlodipine /benzapril combo or just amlodipine  This has been an ongoing question for multiple doctors        Keep an eye on chest area - if any drainage or increased reddness - call for antibiotic  Use a nonadherent dressing while sleeping with paper tape  And leave open to air during day    Has there been genetic testing (ie: for BRCA?)  Consider genetic testing for family members    Return in 1 mos

## 2023-01-05 NOTE — PROGRESS NOTES
FAMILY PRACTICE OFFICE VISIT    NAME: Patito Bonilla    AGE: [de-identified] y o  SEX: female  : 1940   MRN: [id number]    DATE: 2023  TIME: 10:53 AM    Assessment and Plan    1  Post-menopausal      2  Type 2 diabetes mellitus with chronic kidney disease on chronic dialysis, with long-term current use of insulin ([facility] )  Very well controlled  - POCT hemoglobin A1c    3  Mixed hyperlipidemia  Due for labs  Next time she gets bloodwork done for dialysis or home draw to include lipids and hba1c    - Lipid panel; Future  - HEMOGLOBIN A1C W/ EAG ESTIMATION; Future      5  Hyperparathyroidism, unspecified ([facility] )  Secondary to ESRD    6  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 [facility])  Recent removal  Sutures appear somewhat inflammed  Monitor for any infection      7  Moderate protein-calorie malnutrition ([facility] )  Pt drinking nutritional supplement daily      8  Sacroiliitis ([facility] )  Had right hip yesterday    9  Acute on chronic diastolic heart failure (HCC)  Pt without signs of acute CHF      10  Continuous opioid dependence ([facility] )  Thru pain management    11  Chronic atrial fibrillation (HCC)  Rate controlled  12  Iron deficiency anemia due to chronic blood loss  Stable    13  Gastroesophageal reflux disease, unspecified whether esophagitis present  Pending workup - see below    14  Pleural effusion  Await thoracentesis     15  Age-related osteoporosis without current pathological fracture  H/o kyphoplasty  To consider dexa     16  ESRD on dialysis ([address] )  q M-W-    17  History of DVT (deep vein thrombosis)  Was on eliquis  And is now on coumadin (recent change)    18  Malignant neoplasm of female breast, unspecified estrogen receptor status, unspecified laterality, unspecified site of breast ([facility] [address] )      23  htn  Well controlled  However, asked for clarification on bp med  As unsure if pt taking plain norvasc or combination of norvasc/benzapril      There are no Patient Instructions on file for this visit      Since last visit - pt no longer on eliquis for DVT  Is now on coumadin    Discussed that family members should consider genetic testing for cancer    Reviewed specialists that pt sees:  [facility] - recent visit 12/29/2022  Neuro  Oncology  Gyn  GI  Pain management - hip injection yesterday  Cardio  nephro  IR - thoracentesis done yesterday - results pending      Scheduled for the following studies:  Gyn - recent workup (-) as cause of discharge  GI - 1/9/2023 - for EGD and colonoscopy  Ophthalmology - 1/13/2023 - blurred vision  uro - 1/31/2023 - cystoscopy      Home care - family members at all times  Lives with multiple family members   [facility] - primary caregiver      Patient Instructions   Please clarify if eleta taking amlodipine /benzapril combo or just amlodipine  This has been an ongoing question for multiple doctors  Keep an eye on chest area - if any drainage or increased reddness - call for antibiotic  Use a nonadherent dressing while sleeping with paper tape  And leave open to air during day    Has there been genetic testing (ie: for BRCA?)  Consider genetic testing for family members    Return in 1 mos            Chief Complaint     Chief Complaint   Patient presents with   • Follow-up     4m       History of Present Illness   Timoteo Cartre is a [de-identified]y o -year-old female who presents today for routine visit  Here with her granddtr  Pt and granddtr decline forma  and granddtr doing interpretation    Reviewed med list  Not currently getting active cancer treatments but is on letrozole    Since last visit - pt was most recently in hospital on 11/19/2022: For SOB and vaginal discharge    Scheduled for cystoscopy for further workup on 1/31/2022    Gyn workup was (-) for etiology  Review of Systems   Review of Systems   Constitutional: Negative for chills, diaphoresis and fever          Lost 15 # the past 6 mos  Fluids adjusted frequently with dialysis 3x/week    Pt's weight yesterday - essentially unchanged  Respiratory: Negative for cough, shortness of breath and wheezing  Thoracentesis done yesterday for left pleural effusion   Cardiovascular: Negative for chest pain, palpitations and leg swelling  Has not had any chest pain  No use of SL nitro   Gastrointestinal: Positive for abdominal pain  Discomfort in epigastric region  Is on PPI  And diarrhea on and off for a long time  Is drinking a nutritional supplement - ensure - 1 can/day    Appetite overall - eating more snack food - due to being able to eat only small amounts of food at a time  Gets nauseated with larger meals  Does eat fruits, yogurt  Endocrine:        Checking home blood sugars - has been 'good' as per pt's granddtr  Did not bring log - but reports 114, 150, 106      No hypoglycemic episodes   Genitourinary:        Anuric  But has pain in urethral area and a milky discharge  Had gyn workup and was (-)     Musculoskeletal: Positive for arthralgias and back pain  Had hip injection yesterday - due to being on coumadin hold for thoracentesis    Chronic back pain as well  Taking diluadid at least bid  Did take a dose this am and still having pain  She sees pain management    Uses topical voltaren gel  Currently no PT/OT but has VN every couple of weeks  Lives with dtr stevan   And someone is checking in on pt frequently when dtr is working and stays with her all the time   Skin:        Denies open sores - buttocks, heels,  Just sutures on anterior chest wall from recent skin cancer removal      Neurological:        On keppra for seizure prevention  No recent seizures (since taking keppra)    Can walk short distances  Ambulation not as good  Has a bed side commode    Using a walker    No recent falls         Hematological:        Left DVT  On anticoag  Coumadin    Psychiatric/Behavioral:        Taking atarax prn anxiety -   Taking once daily         Active Problem List Patient Active Problem List   Diagnosis   • Ambulatory dysfunction   • Chronic atrial fibrillation (HCC)   • Acute on chronic diastolic heart failure ([place] [address] )   • Coronary artery disease involving native heart without angina pectoris   • ESRD on dialysis ([place] [address] )   • Gastroesophageal reflux disease   • History of DVT (deep vein thrombosis)   • Pleural effusion   • Type 2 diabetes mellitus with chronic kidney disease on chronic dialysis, with long-term current use of insulin (HCC)   • Mixed hyperlipidemia   • Gout   • Left arm swelling   • Breast cancer ([facility])   • Moderate protein-calorie malnutrition (HCC)   • Dysphagia   • Vaginal discharge   • Acute on chronic respiratory failure (HCC)   • Encephalopathy acute   • Seizure (HCC)   • Sacroiliitis ([facility])   • Primary osteoarthritis of both hips   • Chronic low back pain without sciatica   • Status post lumbar spinal fusion   • Carotid artery stenosis   • Continuous opioid dependence ([facility])   • Iron deficiency anemia   • Age-related osteoporosis without current pathological fracture   • Acute deep vein thrombosis (DVT) of left upper extremity ([facility])   • Hypomagnesemia   • Drug-induced constipation   • 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 ([facility])         Past Medical History:  Past Medical History:   Diagnosis Date   • Breast cancer ([place] [address] )    • Hypertension    • Ovarian cancer ([place] [address] )    • Renal disorder    • Skin cancer        Past Surgical History:  Past Surgical History:   Procedure Laterality Date   • BREAST SURGERY     •  SECTION     • EXPLORATORY LAPAROTOMY     • GALLBLADDER SURGERY     • HYSTERECTOMY     • IR THORACENTESIS  2022       Family History:  Family History   Problem Relation Age of Onset   • Pancreatic cancer Mother    • Colon cancer Father        Social History:  Social History     Socioeconomic History   • Marital status: Single     Spouse name: Not on file   • Number of children: Not on file   • Years of education: Not on file   • Highest education level: Not on file   Occupational History   • Not on file   Tobacco Use   • Smoking status: Never   • Smokeless tobacco: Never   Vaping Use   • Vaping Use: Never used   Substance and Sexual Activity   • Alcohol use: Not Currently   • Drug use: Never   • Sexual activity: Not on file   Other Topics Concern   • Not on file   Social History Narrative   • Not on file     Social Determinants of Health     Financial Resource Strain: Not on file   Food Insecurity: No Food Insecurity   • Worried About Running Out of Food in the Last Year: Never true   • Ran Out of Food in the Last Year: Never true   Transportation Needs: No Transportation Needs   • Lack of Transportation (Medical): No   • Lack of Transportation (Non-Medical): No   Physical Activity: Not on file   Stress: Not on file   Social Connections: Not on file   Intimate Partner Violence: Not on file   Housing Stability: Low Risk    • Unable to Pay for Housing in the Last Year: No   • Number of Places Lived in the Last Year: 1   • Unstable Housing in the Last Year: No       Objective   There were no vitals filed for this visit  Wt Readings from Last 3 Encounters:   01/05/23 61 1 kg (134 lb 9 6 oz)   12/16/22 60 kg (132 lb 4 4 oz)   12/15/22 60 8 kg (134 lb)       Physical Exam  Vitals and nursing note reviewed  Constitutional:       General: She is not in acute distress  Appearance: Normal appearance  She is not ill-appearing or toxic-appearing  Eyes:      General: No scleral icterus  Cardiovascular:      Rate and Rhythm: Normal rate  Pulses: Normal pulses  Heart sounds: Normal heart sounds  No murmur heard  Comments: Unable to check BP  Do not have a thigh cuff  Pt has fistula in right arm  And h/o DVT in left upper ext  Irregularly irregular but rate controlled  Pulmonary:      Effort: Pulmonary effort is normal  No respiratory distress  Breath sounds: Normal breath sounds  No wheezing, rhonchi or rales        Comments: Dialysis site - left anterior chest wall - site clean  No longer using right UE fistula - nonfunctioning  bandaid over left posterior thorax from thoracentesis yesterday - clean  Abdominal:      General: There is no distension  Palpations: Abdomen is soft  There is no mass  Tenderness: There is no abdominal tenderness  There is no guarding or rebound  Comments: Pt seated in wheelchair and comfortable  No distress     Musculoskeletal:      Cervical back: Neck supple  Right lower leg: No edema  Left lower leg: No edema  Lymphadenopathy:      Cervical: No cervical adenopathy  Skin:     General: Skin is warm  Coloration: Skin is not jaundiced  Comments: Horizontal lesion across anterior chest wall  Slightly reddened  Sutures in place  bandaid removed and left open to air  No drainage     Neurological:      General: No focal deficit present  Mental Status: She is alert and oriented to person, place, and time  Psychiatric:         Mood and Affect: Mood normal          Behavior: Behavior normal          Thought Content: Thought content normal          Judgment: Judgment normal       Comments: Pt is very pleasant           Pertinent Laboratory/Diagnostic Studies:  Lab Results   Component Value Date    BUN 25 11/19/2022    CREATININE 3 20 (H) 11/19/2022    CALCIUM 9 1 11/19/2022    K 4 3 11/19/2022    CO2 36 (H) 11/19/2022     11/19/2022     Lab Results   Component Value Date    ALT 18 11/19/2022    AST  11/19/2022      Comment:      Unable to Perform   Specimen collection should occur prior to Sulfasalazine administration due to the potential for falsely depressed results       ALKPHOS 123 (H) 11/19/2022       Lab Results   Component Value Date    WBC 5 03 11/19/2022    HGB 10 8 (L) 11/19/2022    HCT 34 4 (L) 11/19/2022    MCV 97 11/19/2022     11/19/2022       No results found for: TSH    No results found for: CHOL  No results found for: TRIG  No results found for: HDL  No results found for: [employer]  Lab Results   Component Value Date    HGBA1C 5 7 09/01/2022       Results for orders placed or performed during the hospital encounter of 01/04/23   Body fluid culture (Pleural Fluid Culture) and Gram stain    Specimen: Pleural, Left;  Body Fluid   Result Value Ref Range    Gram Stain Result Rare Polys     Gram Stain Result No bacteria seen    LD (LDH), Body Fluid   Result Value Ref Range    LD, Fluid 62 U/L   Total Protein, body fluid   Result Value Ref Range    Protein, Fluid 3 5 g/dL   Body fluid white cell count with differential   Result Value Ref Range    Site Pleural fluid-left     Color, Fluid Yellow Clear, Colorless,Yellow    Clarity, Fluid Clear Clear    WBC, Fluid 615 /ul   Glucose, body fluid   Result Value Ref Range    Glucose, Fluid 150 mg/dL   pH, body fluid   Result Value Ref Range    PH BODY FLUID 7 7    Body Fluid Diff   Result Value Ref Range    Total Counted 100     Neutrophils % (Fluid) 21 %    Lymphs % (Fluid) 47 %    Mesothelial % (Fluid) 1 %    Histiocyte % (Fluid) 14 %    Monocytes % (Fluid) 17 %       Orders Placed This Encounter   Procedures   • POCT hemoglobin A1c       ALLERGIES:  Allergies   Allergen Reactions   • Morphine Anaphylaxis     Hallucinations and hives per daughter  anaphylaxis     • Ace Inhibitors Other (See Comments)     dialysis     • Codeine GI Intolerance     "heart racing"  hear racing     • Iodine - Food Allergy Other (See Comments)     Must avoid due to kidneys and thyroid     • Nsaids Other (See Comments)   • Other Other (See Comments)     dialysis   • Tramadol Hives     Sleepiness per daughter who reports "sensitivity"         Current Medications     Current Outpatient Medications   Medication Sig Dispense Refill   • acetaminophen (TYLENOL) 500 mg tablet Take 500 mg by mouth every 6 (six) hours as needed     • amLODIPine-benazepril (LOTREL 2 5-10) 2 5-10 MG per capsule Take 1 capsule by mouth daily     • atorvastatin (LIPITOR) 40 mg tablet Take 1 tablet by mouth every evening     • Diclofenac Sodium (VOLTAREN) 1 % Apply 2 g topically daily  Indications: Joint Damage causing Pain and Loss of Function     • epoetin patricia (Procrit) 2,000 units/mL      • HYDROmorphone (DILAUDID) 2 mg tablet Take 2 mg by mouth every 4 (four) hours as needed for moderate pain     • hydrOXYzine HCL (ATARAX) 25 mg tablet Take 25 mg by mouth Three times daily as needed     • letrozole (FEMARA) 2 5 mg tablet Take 2 5 mg by mouth in the morning     • levETIRAcetam (KEPPRA) 100 mg/mL oral solution 5mL by mouth every 12 hours  On dialysis days only, take an additional 2 5mL after dialysis  473 mL 3   • Linzess 72 MCG CAPS Take 1 capsule by mouth daily     • metoprolol succinate (TOPROL-XL) 25 mg 24 hr tablet Take 25 mg by mouth 2 (two) times a day  Indications: High Blood Pressure Disorder     • Multiple Vitamins-Minerals (ProRenal + D) TABS Take 1 tablet by mouth every evening   Indications: RENAL FAILURE, CHRONIC     • nystatin powder APPLY TO AFFECTED AREA(S) TWICE A DAY     • ondansetron (ZOFRAN) 4 mg tablet Take 4 mg by mouth every 8 (eight) hours as needed for nausea or vomiting     • ondansetron (ZOFRAN-ODT) 4 mg disintegrating tablet TAKE 1 TABLET BY MOUTH EVERY 8 HOURS AS NEEDED FOR NAUSEA OR VOMITING     • pantoprazole (PROTONIX) 20 mg tablet Take 1 tablet by mouth daily pt takes prilosec 20mg daily      • Polyethylene Glycol 3350 (MIRALAX PO) Take by mouth     • senna-docusate sodium (SENOKOT S) 8 6-50 mg per tablet Take 1 tablet by mouth daily as needed     • simethicone (MYLICON) 80 mg chewable tablet Chew 1 tab po once daily 90 tablet 3   • warfarin (COUMADIN) 5 mg tablet Take 1 tablet (5 mg total) by mouth daily 30 tablet 0   • clopidogrel (PLAVIX) 75 mg tablet Take by mouth (Patient not taking: Reported on 11/8/2022)     • LORazepam (ATIVAN) 0 5 mg tablet Take 0 5 mg by mouth every 8 (eight) hours as needed (Patient not taking: Reported on 12/13/2022)     • melatonin 3 mg Take 3 mg by mouth daily at bedtime (Patient not taking: Reported on 12/13/2022)     • nitroglycerin (NITROSTAT) 0 4 mg SL tablet Place 0 4 mg under the tongue (Patient not taking: Reported on 12/13/2022)     • omeprazole (PriLOSEC) 20 mg delayed release capsule Take 20 mg by mouth daily (Patient not taking: Reported on 12/15/2022)       No current facility-administered medications for this visit           Health Maintenance     Health Maintenance   Topic Date Due   • Medicare Annual Wellness Visit (AWV)  Never done   • Diabetic Foot Exam  Never done   • DM Eye Exam  Never done   • Osteoporosis Screening  Never done   • Fall Risk  Never done   • Urinary Incontinence Screening  Never done   • COVID-19 Vaccine (2 - Pfizer risk series) 01/20/2022   • Influenza Vaccine (1) 09/01/2022   • HEMOGLOBIN A1C  03/01/2023   • BMI: Adult  12/16/2023   • Depression Screening  01/05/2024   • Pneumococcal Vaccine: 65+ Years  Completed   • Hepatitis B Vaccine  Completed   • HIB Vaccine  Aged Out   • IPV Vaccine  Aged Out   • Hepatitis A Vaccine  Aged Out   • Meningococcal ACWY Vaccine  Aged Out   • HPV Vaccine  Aged Out     Immunization History   Administered Date(s) Administered   • COVID-19 PFIZER VACCINE 0 3 ML IM 12/30/2021   • Hep B, adult 08/11/2016, 11/14/2016, 01/18/2017, 04/14/2017, 08/22/2017, 10/03/2017, 02/15/2018, 03/15/2018, 07/12/2018, 01/22/2019, 11/20/2019   • Hepatitis B 10/14/2016, 12/14/2016   • INFLUENZA 11/09/2011, 10/12/2012, 03/06/2014, 10/25/2019   • Influenza Quadrivalent Preservative Free 3 years and older IM 09/28/2018   • Influenza Split High Dose Preservative Free IM 10/09/2015, 12/13/2017, 11/18/2020   • Influenza, Seasonal Vaccine, Quadrivalent, Adjuvanted,  5e 11/20/2020   • Pneumococcal Conjugate 13-Valent 10/03/2016, 12/20/2017   • Pneumococcal Polysaccharide PPV23 01/30/2006, 01/06/2010, 12/05/2016   • influenza, trivalent, adjuvanted 11/18/2020, 12/20/2021          Ibrahima Stringer, DO

## 2023-01-06 DIAGNOSIS — K59.03 DRUG-INDUCED CONSTIPATION: Primary | ICD-10-CM

## 2023-01-06 DIAGNOSIS — R56.9 GENERALIZED-ONSET SEIZURES (HCC): ICD-10-CM

## 2023-01-06 NOTE — TELEPHONE ENCOUNTER
Caller: Eduin Parra ( Daughter )  Doctor/office: Dimitri Banks  #: [phone]    % of improvement: 70%  Pain Scale (1-10): 0/10 not moving   When gets up and moves around 7/10

## 2023-01-06 NOTE — TELEPHONE ENCOUNTER
Date/Time: 1-6-23 / 2:15 PM    Nima Rivera called stating she needs an order put into Epic for [facility]  Diagnosis Code R 56 9  Her appt is Shady  10, 2023    Date/Time: 1-9-22 / 9:47 AM    Nima Rivera from [place] [id number] Neurology LVM @ 9:00 AM stating that she needs an order put in

## 2023-01-07 LAB
BACTERIA SPEC BFLD CULT: NO GROWTH
GRAM STN SPEC: NORMAL
GRAM STN SPEC: NORMAL

## 2023-01-07 RX ORDER — AMOXICILLIN 250 MG
1 CAPSULE ORAL DAILY PRN
Qty: 30 TABLET | Refills: 1 | Status: SHIPPED | OUTPATIENT
Start: 2023-01-07 | End: 2023-01-10 | Stop reason: CLARIF

## 2023-01-09 ENCOUNTER — APPOINTMENT (OUTPATIENT)
Dept: LAB | Facility: MEDICAL CENTER | Age: 83
End: 2023-01-09

## 2023-01-09 ENCOUNTER — OFFICE VISIT (OUTPATIENT)
Dept: GASTROENTEROLOGY | Facility: MEDICAL CENTER | Age: 83
End: 2023-01-09

## 2023-01-09 ENCOUNTER — TELEPHONE (OUTPATIENT)
Dept: GASTROENTEROLOGY | Facility: MEDICAL CENTER | Age: 83
End: 2023-01-09

## 2023-01-09 VITALS — BODY MASS INDEX: 22.71 KG/M2 | WEIGHT: 132.28 LBS | TEMPERATURE: 97.8 F | HEART RATE: 74 BPM

## 2023-01-09 DIAGNOSIS — D64.9 ANEMIA, UNSPECIFIED TYPE: ICD-10-CM

## 2023-01-09 DIAGNOSIS — M46.1 SACROILIITIS (HCC): Primary | ICD-10-CM

## 2023-01-09 DIAGNOSIS — R10.13 EPIGASTRIC PAIN: Primary | ICD-10-CM

## 2023-01-09 DIAGNOSIS — K59.00 CONSTIPATION, UNSPECIFIED CONSTIPATION TYPE: ICD-10-CM

## 2023-01-09 DIAGNOSIS — R13.10 PILL DYSPHAGIA: ICD-10-CM

## 2023-01-09 DIAGNOSIS — Z79.01 CHRONIC ANTICOAGULATION: ICD-10-CM

## 2023-01-09 LAB
INR PPP: 1.92 (ref 0.84–1.19)
PROTHROMBIN TIME: 22.2 SECONDS (ref 11.6–14.5)

## 2023-01-09 RX ORDER — AMLODIPINE BESYLATE 2.5 MG/1
TABLET ORAL
COMMUNITY
Start: 2022-12-14

## 2023-01-09 RX ORDER — FAMOTIDINE 20 MG/1
20 TABLET, FILM COATED ORAL 2 TIMES DAILY PRN
Qty: 60 TABLET | Refills: 11 | Status: SHIPPED | OUTPATIENT
Start: 2023-01-09

## 2023-01-09 RX ORDER — PANTOPRAZOLE SODIUM 20 MG/1
20 TABLET, DELAYED RELEASE ORAL DAILY
Qty: 60 TABLET | Refills: 11 | Status: SHIPPED | OUTPATIENT
Start: 2023-01-09

## 2023-01-09 NOTE — PROGRESS NOTES
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 recurrence here for abdominal pain, iron deficiency anemia and dysphagia  Challenging situation given patient's multiple co-morbidities and patient's desire to avoid invasive procedures  Given this, will try increasing acid suppression to see if improvement in pain  If pain and poor PO intake persist, may need to revisit endoscopic eval   1  Epigastric pain  - pantoprazole (PROTONIX) 20 mg tablet; Take 1 tablet (20 mg total) by mouth daily  Dispense: 60 tablet; Refill: 11  - famotidine (PEPCID) 20 mg tablet; Take 1 tablet (20 mg total) by mouth 2 (two) times a day as needed (abdominal pain)  Dispense: 60 tablet; Refill: 11    2  Anemia, unspecified type  Recent HD labs with HgB at baseline  Daughter will continue to monitor stools  3  Pill dysphagia  Barium with mild dysmotility  Previously seen speech  -Recommended daughter review med list with pharmacist to see which meds can be crushed in apple sauce  Food dysphagia has largely improved  4  Constipation, unspecified constipation type  Continue current regimen with Senna, miralax, and linzess    ______________________________________________________________________    SUBJECTIVE:    Patient is [place] speaking and daughter provided history  Having a lot of abdominal pain  Primarily upper abdomen  Worse with taking medications  Some postprandial vomiting but also has some vomiting due to hip pain  Bowel movements have been darker brown  No hematochezia  Some days has 2-3 BM in a day, sometimes goes 2 days without BM  Takes Senna, activia with Miralax, Linzess 72 mcg as needed  Using Linzess two to three times a week    Appetite has been down, losing weight  Very poor PO intake on dialysis days  Taking pantoprazole 20 mg daily  Does not misses doses so not sure if helping  Swallowing food has improved  Still has some difficulty with pills  HgB 10 6 23    Last seen 6/15/22 for dysphagia  Had barium swallow that showed mild dysmotility and was otherwise normal   Decided not to pursue EGD due to comoribidities  Seen by GYN 12/15/22 for tan vaginal discharge  No evidence of fistula on speculum exam 12/15/22      REVIEW OF SYSTEMS IS OTHERWISE NEGATIVE        Historical Information   Past Medical History:   Diagnosis Date   • Breast cancer ([facility] )    • Hypertension    • Ovarian cancer ([facility] )    • Renal disorder    • Skin cancer      Past Surgical History:   Procedure Laterality Date   • BREAST SURGERY     •  SECTION     • EXPLORATORY LAPAROTOMY     • GALLBLADDER SURGERY     • HYSTERECTOMY     • IR THORACENTESIS  2022   • IR THORACENTESIS  2023     Social History   Social History     Substance and Sexual Activity   Alcohol Use Not Currently     Social History     Substance and Sexual Activity   Drug Use Never     Social History     Tobacco Use   Smoking Status Never   Smokeless Tobacco Never     Family History   Problem Relation Age of Onset   • Pancreatic cancer Mother    • Colon cancer Father        Meds/Allergies       Current Outpatient Medications:   •  acetaminophen (TYLENOL) 500 mg tablet  •  amLODIPine-benazepril (LOTREL 2 5-10) 2 5-10 MG per capsule  •  atorvastatin (LIPITOR) 40 mg tablet  •  clopidogrel (PLAVIX) 75 mg tablet  •  Diclofenac Sodium (VOLTAREN) 1 %  •  epoetin patricia (Procrit) 2,000 units/mL  •  HYDROmorphone (DILAUDID) 2 mg tablet  •  hydrOXYzine HCL (ATARAX) 25 mg tablet  •  letrozole (FEMARA) 2 5 mg tablet  •  levETIRAcetam (KEPPRA) 100 mg/mL oral solution  •  Linzess 72 MCG CAPS  •  LORazepam (ATIVAN) 0 5 mg tablet  •  metoprolol succinate (TOPROL-XL) 25 mg 24 hr tablet  •  Multiple Vitamins-Minerals (ProRenal + D) TABS  •  nitroglycerin (NITROSTAT) 0 4 mg SL tablet  •  nystatin powder  •  omeprazole (PriLOSEC) 20 mg delayed release capsule  •  ondansetron (ZOFRAN) 4 mg tablet  •  ondansetron (ZOFRAN-ODT) 4 mg disintegrating tablet  •  pantoprazole (PROTONIX) 20 mg tablet  •  Polyethylene Glycol 3350 (MIRALAX PO)  •  senna-docusate sodium (SENOKOT S) 8 6-50 mg per tablet  •  simethicone (MYLICON) 80 mg chewable tablet  •  warfarin (COUMADIN) 5 mg tablet    Allergies   Allergen Reactions   • Morphine Anaphylaxis     Hallucinations and hives per daughter  anaphylaxis     • Ace Inhibitors Other (See Comments)     dialysis     • Codeine GI Intolerance     "heart racing"  hear racing     • Iodine - Food Allergy Other (See Comments)     Must avoid due to kidneys and thyroid     • Nsaids Other (See Comments)   • Other Other (See Comments)     dialysis   • Tramadol Hives     Sleepiness per daughter who reports "sensitivity"             Objective   Pulse 74   Temp 97 8 °F (36 6 °C)   Wt 60 kg (132 lb 4 4 oz)   BMI 22 71 kg/m²       PHYSICAL EXAM:      General Appearance:   Alert, cooperative, no distress   HEENT:   Normocephalic, atraumatic, anicteric  Neck:  Supple, symmetrical, trachea midline   Lungs:   Clear to auscultation bilaterally; no rales, rhonchi or wheezing; respirations unlabored    Heart[de-identified]   Regular rate and rhythm; no murmur, rub, or gallop  Abdomen:   Soft, non-tender, non-distended; normal bowel sounds; no masses, no organomegaly    Genitalia:   Deferred    Rectal:   Deferred    Extremities:  No cyanosis, clubbing or edema    Pulses:  2+ and symmetric    Skin:  No jaundice, rashes, or lesions    Lymph nodes:  No palpable cervical lymphadenopathy        Lab Results:   No visits with results within 1 Day(s) from this visit     Latest known visit with results is:   Hospital Outpatient Visit on 01/04/2023   Component Date Value   • Site 01/04/2023 Pleural fluid-left    • Color, Fluid 01/04/2023 Yellow    • Clarity, Fluid 01/04/2023 Clear    • WBC, Fluid 01/04/2023 615    • Body Fluid Culture, Ster* 01/04/2023 No growth    • Gram Stain Result 01/04/2023 Rare Polys    • Gram Stain Result 01/04/2023 No bacteria seen    • Glucose, Fluid 01/04/2023 150    • LD, Fluid 01/04/2023 62    • Case Report 01/04/2023                      Value:Non-gynecologic Cytology                          Case: [id number]                                  Authorizing Provider:  Michele Rubio            Collected:           01/04/2023 0928                                     MD Cherelle                                                             Ordering Location:     [facility]        Received:            01/04/2023 1 [facility] [place] [facility]                                                                            Radiology                                                                    Pathologist:           Racheal Willams MD                                                         Specimens:   A) - Thoracentesis, Left                                                                            B) - Thoracentesis, cell block                                                            • Final Diagnosis 01/04/2023                      Value: This result contains rich text formatting which cannot be displayed here  • Gross Description 01/04/2023                      Value: This result contains rich text formatting which cannot be displayed here  • Clinical Information 01/04/2023                      Value:pleural effusion   • Additional Information 01/04/2023                      Value: This result contains rich text formatting which cannot be displayed here     • [place] BODY FLUID 01/04/2023 7 7    • Protein, Fluid 01/04/2023 3 5    • Total Counted 01/04/2023 100    • Neutrophils % (Fluid) 01/04/2023 21    • Lymphs % (Fluid) 01/04/2023 47    • Mesothelial % (Fluid) 01/04/2023 1    • Histiocyte % (Fluid) 01/04/2023 14    • Monocytes % (Fluid) 01/04/2023 17          Radiology Results:   IR thoracentesis    Result Date: 1/5/2023  Narrative: PROCEDURE: Therapeutic and diagnostic left thoracentesis STAFF: Colt Hernández PA-C COMPLICATIONS: None immediate  INDICATION: Symptomatic left pleural effusion  PROCEDURE: Using ultrasound guidance, the left pleural cavity was punctured and a catheter placed into the pleural cavity  A total of 240 mL of fluid was removed  The catheter was then removed  FINDINGS: Small left pleural effusion  Clear yellow pleural fluid was removed  Impression: Therapeutic and diagnostic left thoracentesis  Procedure was performed, signed and dictated by: Colt Hernández PA-C Supervised by: Dr Kendy Ackerman Workstation performed: [id number]     FL spine and pain procedure    Result Date: 12/28/2022  Narrative: Intraarticular Hip Injection under Fluoroscopy Indication: Left hip pain Preoperative diagnosis: Left hip arthropathy Postoperative diagnosis: Left hip arthropathy Procedure: Fluoroscopically-guided left intraarticular hip injection After discussing the risks, benefits, and alternatives to the procedure, the patient expressed understanding and wished to proceed  The patient was brought to the fluoroscopic suite and placed in the supine position  Procedural pause conducted to verify: correct patient identity, procedure to be performed, and as applicable, correct side and site, correct patient position, and availability of implants, special equipment and special requirements  The femoral artery was palpated and marked  Using fluoroscopy, an AP view was utilized to visualize the left hip joint  Next, a point at the junction of the ipsilateral femoral head and femoral neck was identified, and noted to be a safe distance lateral to the pulsation of the aforementioned femoral artery   The skin was sterilely prepped and draped in the usual fashion using Chloraprep skin prep  The skin and subcutaneous tissues were anesthetized with 1% lidocaine  Using fluoroscopic guidance, a 3 5 inch 22 gauge spinal needle was advanced into the joint  After negative aspiration, 1 mL of Omnipaque 300 contrast was injected which confirmed intra-articular placement without evidence of intravascular uptake  A 4 ml solution consisting of 40 mg of Depo-Medrol in 0 25% bupivacaine was injected  The needle was withdrawn from the skin while being flushed with lidocaine  The patient tolerated the procedure well, and there were no apparent complications  After appropriate observation, the patient was dismissed from the outpatient procedure area in good condition under their own power   EBL: Minimal Specimen: None

## 2023-01-10 ENCOUNTER — HOSPITAL ENCOUNTER (OUTPATIENT)
Dept: RADIOLOGY | Facility: CLINIC | Age: 83
Discharge: HOME | End: 2023-01-10
Admitting: ANESTHESIOLOGY

## 2023-01-10 ENCOUNTER — OFFICE VISIT (OUTPATIENT)
Dept: NEUROLOGY | Facility: CLINIC | Age: 83
End: 2023-01-10

## 2023-01-10 VITALS
OXYGEN SATURATION: 94 % | HEART RATE: 67 BPM | SYSTOLIC BLOOD PRESSURE: 125 MMHG | DIASTOLIC BLOOD PRESSURE: 61 MMHG | TEMPERATURE: 97.7 F | RESPIRATION RATE: 20 BRPM

## 2023-01-10 VITALS — HEART RATE: 72 BPM | TEMPERATURE: 97.2 F | OXYGEN SATURATION: 94 %

## 2023-01-10 DIAGNOSIS — R26.2 AMBULATORY DYSFUNCTION: Primary | ICD-10-CM

## 2023-01-10 DIAGNOSIS — R56.9 SEIZURE (HCC): ICD-10-CM

## 2023-01-10 DIAGNOSIS — R56.9 GENERALIZED-ONSET SEIZURES (HCC): ICD-10-CM

## 2023-01-10 DIAGNOSIS — M46.1 SACROILIITIS (HCC): ICD-10-CM

## 2023-01-10 RX ORDER — METHYLPREDNISOLONE ACETATE 80 MG/ML
80 INJECTION, SUSPENSION INTRA-ARTICULAR; INTRALESIONAL; INTRAMUSCULAR; PARENTERAL; SOFT TISSUE ONCE
Status: COMPLETED | OUTPATIENT
Start: 2023-01-10 | End: 2023-01-10

## 2023-01-10 RX ORDER — BUPIVACAINE HCL/PF 2.5 MG/ML
4 VIAL (ML) INJECTION ONCE
Status: COMPLETED | OUTPATIENT
Start: 2023-01-10 | End: 2023-01-10

## 2023-01-10 RX ORDER — LIDOCAINE HYDROCHLORIDE 10 MG/ML
5 INJECTION, SOLUTION EPIDURAL; INFILTRATION; INTRACAUDAL; PERINEURAL ONCE
Status: COMPLETED | OUTPATIENT
Start: 2023-01-10 | End: 2023-01-10

## 2023-01-10 RX ORDER — LEVETIRACETAM 100 MG/ML
SOLUTION ORAL
Qty: 473 ML | Refills: 3 | Status: SHIPPED | OUTPATIENT
Start: 2023-01-10 | End: 2023-02-27 | Stop reason: SDUPTHER

## 2023-01-10 RX ADMIN — LIDOCAINE HYDROCHLORIDE 5 ML: 10 INJECTION, SOLUTION EPIDURAL; INFILTRATION; INTRACAUDAL; PERINEURAL at 13:34

## 2023-01-10 RX ADMIN — IOHEXOL 1 ML: 300 INJECTION, SOLUTION INTRAVENOUS at 13:34

## 2023-01-10 RX ADMIN — METHYLPREDNISOLONE ACETATE 80 MG: 80 INJECTION, SUSPENSION INTRA-ARTICULAR; INTRALESIONAL; INTRAMUSCULAR; SOFT TISSUE at 13:35

## 2023-01-10 RX ADMIN — BUPIVACAINE HYDROCHLORIDE 4 ML: 2.5 INJECTION, SOLUTION EPIDURAL; INFILTRATION; INTRACAUDAL at 13:35

## 2023-01-10 NOTE — RESULT ENCOUNTER NOTE
I called and left VM for the daughter about the thoracentesis fluid analysis  Seems to be related to heart disease/renal failure and probably protein nutritional deficiency   Follow up will be in 5 months in the office

## 2023-01-10 NOTE — PATIENT INSTRUCTIONS
Continue levetiracetam unchanged  Talk to PCP about physical therapy (consider home health if possible)  Return in about 6 months

## 2023-01-10 NOTE — DISCHARGE INSTRUCTIONS
Steroid Joint Injection   WHAT YOU NEED TO KNOW:   A steroid joint injection is a procedure to inject steroid medicine into a joint  Steroid medicine decreases pain and inflammation  The injection may also contain an anesthetic (numbing medicine) to decrease pain  It may be done to treat conditions such as arthritis, gout, or carpal tunnel syndrome  The injections may be given in your knee, ankle, shoulder, elbow, wrist, ankle or sacroiliac joint  Do not apply heat to any area that is numb  If you have discomfort or soreness at the injection site, you may apply ice today, 20 minutes on and 20 minutes off  Tomorrow you may use ice or warm, moist heat  Do not apply ice or heat directly to the skin  You may have an increase or change in the discomfort for 36-48 hours after your treatment  Apply ice and continue with any pain medicine you have been prescribed  Do not do anything strenuous today  You may shower, but no tub baths or hot tubs today  You may resume your normal activities tomorrow, but do not “overdo it”  Resume normal activities slowly when you are feeling better  If you experience redness, drainage or swelling at the injection site, or if you develop a fever above 100 degrees, please call [facility] at [phone] or go to the Emergency Room  Continue to take all routine medicines prescribed by your primary care physician unless otherwise instructed by our staff  Most blood thinners should be started again according to your regularly scheduled dosing  If you have any questions, please give our office a call  As no general anesthesia was used in today's procedure, you should not experience any side effects related to anesthesia  If you are diabetic, the steroids used in today's injection may temporarily increase your blood sugar levels after the first few days after your injection   Please keep a close eye on your sugars and alert the doctor who manages your diabetes if your sugars are significantly high from your baseline or you are symptomatic  If you have a problem specifically related to your procedure, please call our office at [phone]  Problems not related to your procedure should be directed to your primary care physician

## 2023-01-10 NOTE — PROGRESS NOTES
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 in mid 2022  Semiology may suggest left hemisphere onset  No seizures while on levetiracetam  No change to therapy today  Patient Instructions   1  Continue levetiracetam unchanged  2  Talk to PCP about physical therapy (consider home health if possible)  3  Return in about 6 months  Diagnoses and all orders for this visit:    Ambulatory dysfunction    Generalized-onset seizures ([address] )  -     Ambulatory Referral to Neurology    Seizure [facility])  -     Discontinue: levETIRAcetam (KEPPRA) 100 mg/mL oral solution; 5mL by mouth every 12 hours  On dialysis days only, take an additional 2 5mL after dialysis  -     Levetiracetam level; Future        Subjective    Toy Diogo is returning to the [place] [address] [facility] for follow up  Interval Events:   Last seen 8/30/2022  Family helps interpret  Naps after morning levetiracetam, but did that before levetiracetam as well  A little forgetful and sometimes confused at times  Walking a little more because hip injections are helping  Walks with walker and assistance  Needs assistance with shower  Hands cramp at night  Wakes her up 1-2 times per night  Neuropathy for 3+ years  History per 8/30/2022 note and verified today:   History of \"HTN, hyperlipidemia, metastatic breast cancer, chronic pain, atrial fibrillation, history of DVT and ESRD on dialysis, who was seen in the hospital due to a seizure  She speaks [place] and her daughter was present to provide translation   Her daughter reported that the pt was noted to have an abrupt onset of some facial head turning with eye deviation to the right facial twitching of her eye in her cheek on the right followed then by bilateral facial " "twitching as well as extension postures  She also had generalized shaking   A rapid response was called during hospitalization  She was given Ativan and transferred to the ICU  She had a similar event and was seen at [facility] and EEG were recommended but the pt requested to be discharged home at that time  She was also advised to start [address]  She had an outpt MRI without contrast that was negative  It was repeated during her hospitalization at [place] [address] and was also negative  She did not have an EEG and it was determined to not be necessary as it would not   Keppra was started during her hospitalization and she was discharged home  \"    Current AEDs:  Levetiracetam (solution) 500 mg bid with an additional 250 mg after HD  Medication side effects: may contribute to drowsiness  Medication adherence: Yes    Event/Seizure semiology:  abrupt onset of some facial head turning with eye deviation to the right facial twitching of her eye in her cheek on the right followed then by bilateral facial twitching as well as extension postures  She also had generalized shaking       Special Features  Status epilepticus: no  Self Injury Seizures: no  Precipitating Factors: none    Epilepsy Risk Factors:  None    Prior AEDs:  None    Prior Evaluation:  Brain MRI 8/8/2022: White matter changes suggestive of chronic microangiopathy  No acute intracranial pathology  Loss of the flow void of the right internal carotid artery suggests right ICA occlusion in the neck  No evidence of infarct in the right hemisphere  History Reviewed:    The following were reviewed and updated as appropriate: allergies, current medications, past family history, past medical history, past social history, past surgical history and problem list    Psychiatric History:  None    Social History:   Driving: No  Lives Alone: No        Objective    Pulse 72   Temp (!) 97 2 °F (36 2 °C) (Temporal)   SpO2 94%      General Exam  No acute " distress  No edema, rash, erythema or significant tenderness in the lowers below the knees  No wounds on feet  Neurologic Exam   Mental Status:  Alert and oriented  Language: normal fluency and comprehension  Cranial Nerves:  VFFTC  EOMI, no nystagums  Face symmetric  No dysarthria  Motor:  No drift  Strength 5/5 in uppers  4+/5 hip flexion, 4/5 dorsiflexion, otherwise 5/5 in the lowers  Coordination: mild bilateral action tremor  Otherwise, finger to nose intact  Sensation: decreased LT, proprioception and vibration in the lowers distally  Gait: In wheel chair  Gait not tested

## 2023-01-10 NOTE — H&P
History of Present Illness: The patient is a [de-identified] y o  female who presents with complaints of low back and buttock pain  Past Medical History:   Diagnosis Date   • Breast cancer ([facility] )    • Hypertension    • Ovarian cancer ([facility] )    • Renal disorder    • Skin cancer        Past Surgical History:   Procedure Laterality Date   • BREAST SURGERY     •  SECTION     • EXPLORATORY LAPAROTOMY     • GALLBLADDER SURGERY     • HYSTERECTOMY     • IR THORACENTESIS  2022   • IR THORACENTESIS  2023         Current Outpatient Medications:   •  acetaminophen (TYLENOL) 500 mg tablet, Take 500 mg by mouth every 6 (six) hours as needed, Disp: , Rfl:   •  amLODIPine (NORVASC) 2 5 mg tablet, TAKE 1 TABLET BY MOUTH TWICE A DAY  HOLD MORNING DOSE ON DIALYSIS DAYS, Disp: , Rfl:   •  amLODIPine-benazepril (LOTREL 2 5-10) 2 5-10 MG per capsule, Take 1 capsule by mouth daily (Patient not taking: Reported on 1/10/2023), Disp: , Rfl:   •  atorvastatin (LIPITOR) 40 mg tablet, Take 1 tablet by mouth every evening, Disp: , Rfl:   •  Diclofenac Sodium (VOLTAREN) 1 %, Apply 2 g topically daily  Indications: Joint Damage causing Pain and Loss of Function, Disp: , Rfl:   •  epoetin patricia (Procrit) 2,000 units/mL, , Disp: , Rfl:   •  famotidine (PEPCID) 20 mg tablet, Take 1 tablet (20 mg total) by mouth 2 (two) times a day as needed (abdominal pain), Disp: 60 tablet, Rfl: 11  •  HYDROmorphone (DILAUDID) 2 mg tablet, Take 2 mg by mouth every 4 (four) hours as needed for moderate pain, Disp: , Rfl:   •  hydrOXYzine HCL (ATARAX) 25 mg tablet, Take 25 mg by mouth Three times daily as needed, Disp: , Rfl:   •  letrozole (FEMARA) 2 5 mg tablet, Take 2 5 mg by mouth in the morning, Disp: , Rfl:   •  levETIRAcetam (KEPPRA) 100 mg/mL oral solution, 5mL by mouth every 12 hours  On dialysis days only, take an additional 2 5mL after dialysis  , Disp: 473 mL, Rfl: 3  •  Linzess 72 MCG CAPS, Take 1 capsule by mouth daily, Disp: , Rfl:   •  LORazepam (ATIVAN) 0 5 mg tablet, Take 0 5 mg by mouth every 8 (eight) hours as needed, Disp: , Rfl:   •  metoprolol succinate (TOPROL-XL) 25 mg 24 hr tablet, Take 25 mg by mouth 2 (two) times a day  Indications: High Blood Pressure Disorder, Disp: , Rfl:   •  Multiple Vitamins-Minerals (ProRenal + D) TABS, Take 1 tablet by mouth every evening   Indications: RENAL FAILURE, CHRONIC, Disp: , Rfl:   •  nitroglycerin (NITROSTAT) 0 4 mg SL tablet, Place 0 4 mg under the tongue, Disp: , Rfl:   •  nystatin powder, APPLY TO AFFECTED AREA(S) TWICE A DAY, Disp: , Rfl:   •  ondansetron (ZOFRAN) 4 mg tablet, Take 4 mg by mouth every 8 (eight) hours as needed for nausea or vomiting, Disp: , Rfl:   •  ondansetron (ZOFRAN-ODT) 4 mg disintegrating tablet, TAKE 1 TABLET BY MOUTH EVERY 8 HOURS AS NEEDED FOR NAUSEA OR VOMITING, Disp: , Rfl:   •  pantoprazole (PROTONIX) 20 mg tablet, Take 1 tablet (20 mg total) by mouth daily, Disp: 60 tablet, Rfl: 11  •  Polyethylene Glycol 3350 (MIRALAX PO), Take by mouth, Disp: , Rfl:   •  simethicone (MYLICON) 80 mg chewable tablet, Chew 1 tab po once daily, Disp: 90 tablet, Rfl: 3  •  warfarin (COUMADIN) 5 mg tablet, Take 1 tablet (5 mg total) by mouth daily, Disp: 30 tablet, Rfl: 0    Allergies   Allergen Reactions   • Morphine Anaphylaxis     Hallucinations and hives per daughter  anaphylaxis     • Ace Inhibitors Other (See Comments)     dialysis     • Codeine GI Intolerance     "heart racing"  hear racing     • Iodine - Food Allergy Other (See Comments)     Must avoid due to kidneys and thyroid     • Nsaids Other (See Comments)   • Other Other (See Comments)     dialysis   • Tramadol Hives     Sleepiness per daughter who reports "sensitivity"         Physical Exam:   Vitals:    01/10/23 1310   BP: 112/63   Pulse: 72   Resp: 18   Temp: 97 7 °F (36 5 °C)   SpO2: 94%     General: Awake, Alert, Oriented x 3, Mood and affect appropriate  Respiratory: Respirations even and unlabored  Cardiovascular: Peripheral pulses intact; no edema  Musculoskeletal Exam: Tenderness palpation over bilateral SI joints    ASA Score: 4    Patient/Chart Verification  Patient ID Verified: Verbal  ID Band Applied: No  Consents Confirmed: Procedural, To be obtained in the Pre-Procedure area  Interval H&P(within 24 hr) Complete (required for Outpatients and Surgery Admit only): To be obtained in the Pre-Procedure area  Allergies Reviewed: Yes  Anticoag/NSAID held?: NA  Currently on antibiotics?: No    Assessment:   1   Sacroiliitis (HCC)        Plan: B/L SIJ INJ

## 2023-01-10 NOTE — PROGRESS NOTES
ROS:    Review of Systems   Constitutional: Negative  Negative for appetite change and fever  HENT: Negative  Negative for hearing loss, tinnitus, trouble swallowing and voice change  Eyes: Negative  Negative for photophobia, pain and visual disturbance  Respiratory: Negative  Negative for shortness of breath  Cardiovascular: Negative  Negative for palpitations  Gastrointestinal: Positive for nausea  Negative for vomiting  Endocrine: Negative  Negative for cold intolerance  Genitourinary: Negative  Negative for dysuria, frequency and urgency  Musculoskeletal: Positive for gait problem  Negative for myalgias and neck pain  Skin: Negative  Negative for rash  Allergic/Immunologic: Negative  Neurological: Positive for dizziness, weakness (Feet), numbness (Feet) and headaches  Negative for tremors, seizures, syncope, facial asymmetry, speech difficulty and light-headedness  Hematological: Negative  Does not bruise/bleed easily  Psychiatric/Behavioral: Negative  Negative for confusion, hallucinations and sleep disturbance  ROS reviewed and updated as appropriate

## 2023-01-13 DIAGNOSIS — K59.00 CONSTIPATION, UNSPECIFIED CONSTIPATION TYPE: Primary | ICD-10-CM

## 2023-01-13 RX ORDER — AMOXICILLIN 250 MG
1 CAPSULE ORAL DAILY
COMMUNITY
End: 2023-01-13 | Stop reason: SDUPTHER

## 2023-01-17 ENCOUNTER — TELEPHONE (OUTPATIENT)
Dept: RADIOLOGY | Facility: MEDICAL CENTER | Age: 83
End: 2023-01-17

## 2023-01-17 RX ORDER — AMOXICILLIN 250 MG
1 CAPSULE ORAL DAILY
Qty: 30 TABLET | Refills: 1 | Status: SHIPPED | OUTPATIENT
Start: 2023-01-17 | End: 2023-02-16

## 2023-01-19 ENCOUNTER — HOSPITAL ENCOUNTER (EMERGENCY)
Facility: HOSPITAL | Age: 83
Discharge: HOME | End: 2023-01-19
Attending: EMERGENCY MEDICINE

## 2023-01-19 ENCOUNTER — APPOINTMENT (EMERGENCY)
Dept: CT IMAGING | Facility: HOSPITAL | Age: 83
End: 2023-01-19

## 2023-01-19 VITALS
RESPIRATION RATE: 18 BRPM | OXYGEN SATURATION: 96 % | TEMPERATURE: 98.3 F | DIASTOLIC BLOOD PRESSURE: 55 MMHG | HEART RATE: 62 BPM | SYSTOLIC BLOOD PRESSURE: 121 MMHG

## 2023-01-19 DIAGNOSIS — R10.9 FLANK PAIN: Primary | ICD-10-CM

## 2023-01-19 DIAGNOSIS — R07.81 RIB PAIN ON RIGHT SIDE: ICD-10-CM

## 2023-01-19 DIAGNOSIS — M79.18 MUSCULOSKELETAL PAIN: ICD-10-CM

## 2023-01-19 LAB
ALBUMIN SERPL BCP-MCNC: 3.7 G/DL (ref 3.5–5)
ALP SERPL-CCNC: 132 U/L (ref 34–104)
ALT SERPL W P-5'-P-CCNC: 8 U/L (ref 7–52)
ANION GAP SERPL CALCULATED.3IONS-SCNC: 8 MMOL/L (ref 4–13)
AST SERPL W P-5'-P-CCNC: 13 U/L (ref 13–39)
BASOPHILS # BLD AUTO: 0.02 THOUSANDS/ÂΜL (ref 0–0.1)
BASOPHILS NFR BLD AUTO: 0 % (ref 0–1)
BILIRUB SERPL-MCNC: 0.36 MG/DL (ref 0.2–1)
BUN SERPL-MCNC: 35 MG/DL (ref 5–25)
CALCIUM SERPL-MCNC: 9 MG/DL (ref 8.4–10.2)
CHLORIDE SERPL-SCNC: 99 MMOL/L (ref 96–108)
CO2 SERPL-SCNC: 32 MMOL/L (ref 21–32)
CREAT SERPL-MCNC: 3.71 MG/DL (ref 0.6–1.3)
EOSINOPHIL # BLD AUTO: 0.1 THOUSAND/ÂΜL (ref 0–0.61)
EOSINOPHIL NFR BLD AUTO: 2 % (ref 0–6)
ERYTHROCYTE [DISTWIDTH] IN BLOOD BY AUTOMATED COUNT: 15.5 % (ref 11.6–15.1)
GFR SERPL CREATININE-BSD FRML MDRD: 10 ML/MIN/1.73SQ M
GLUCOSE SERPL-MCNC: 153 MG/DL (ref 65–140)
HCT VFR BLD AUTO: 34 % (ref 34.8–46.1)
HGB BLD-MCNC: 10.8 G/DL (ref 11.5–15.4)
IMM GRANULOCYTES # BLD AUTO: 0.03 THOUSAND/UL (ref 0–0.2)
IMM GRANULOCYTES NFR BLD AUTO: 1 % (ref 0–2)
LIPASE SERPL-CCNC: 58 U/L (ref 11–82)
LYMPHOCYTES # BLD AUTO: 1.57 THOUSANDS/ÂΜL (ref 0.6–4.47)
LYMPHOCYTES NFR BLD AUTO: 24 % (ref 14–44)
MCH RBC QN AUTO: 31.2 PG (ref 26.8–34.3)
MCHC RBC AUTO-ENTMCNC: 31.8 G/DL (ref 31.4–37.4)
MCV RBC AUTO: 98 FL (ref 82–98)
MONOCYTES # BLD AUTO: 0.44 THOUSAND/ÂΜL (ref 0.17–1.22)
MONOCYTES NFR BLD AUTO: 7 % (ref 4–12)
NEUTROPHILS # BLD AUTO: 4.33 THOUSANDS/ÂΜL (ref 1.85–7.62)
NEUTS SEG NFR BLD AUTO: 66 % (ref 43–75)
NRBC BLD AUTO-RTO: 0 /100 WBCS
PLATELET # BLD AUTO: 199 THOUSANDS/UL (ref 149–390)
PMV BLD AUTO: 10.3 FL (ref 8.9–12.7)
POTASSIUM SERPL-SCNC: 5.3 MMOL/L (ref 3.5–5.3)
PROT SERPL-MCNC: 6.2 G/DL (ref 6.4–8.4)
RBC # BLD AUTO: 3.46 MILLION/UL (ref 3.81–5.12)
SODIUM SERPL-SCNC: 139 MMOL/L (ref 135–147)
WBC # BLD AUTO: 6.49 THOUSAND/UL (ref 4.31–10.16)

## 2023-01-19 RX ORDER — HYDROMORPHONE HCL/PF 1 MG/ML
1 SYRINGE (ML) INJECTION ONCE
Status: COMPLETED | OUTPATIENT
Start: 2023-01-19 | End: 2023-01-19

## 2023-01-19 RX ORDER — IODIXANOL 320 MG/ML
100 INJECTION, SOLUTION INTRAVASCULAR
Status: COMPLETED | OUTPATIENT
Start: 2023-01-19 | End: 2023-01-19

## 2023-01-19 RX ADMIN — HYDROMORPHONE HYDROCHLORIDE 1 MG: 1 INJECTION, SOLUTION INTRAMUSCULAR; INTRAVENOUS; SUBCUTANEOUS at 18:56

## 2023-01-19 RX ADMIN — IODIXANOL 100 ML: 320 INJECTION, SOLUTION INTRAVASCULAR at 19:16

## 2023-01-19 NOTE — TELEPHONE ENCOUNTER
S/W Lakia Izquierdo per [facility]  Advised to give it another week and CB with update  States she would like to schedule repeat at that 3 month juan miguel  Advised will address this when giving update in one week

## 2023-01-19 NOTE — TELEPHONE ENCOUNTER
Caller: patient daughter    Doctor: Wesley Human    Reason for call: pain level 6  Improvement 70%  Is experiencing Right under arm pain after her procedure  When she is able to reschedule another procedure    Call back#: [phone]

## 2023-01-19 NOTE — TELEPHONE ENCOUNTER
We will see how the patient does over the next week    These injections can be repeated every 3 months as needed

## 2023-01-19 NOTE — ED PROVIDER NOTES
History  Chief Complaint   Patient presents with   • Flank Pain     Pt c/o right sided flank pain that radiates to right side of stomach  Pt unsure if she was injured while at physical therapy  Pt also reports burning with urination  HPI  Patient is an 33-year-old female with history of breast cancer with mastectomy, skin cancer status postresection, ovarian cancer status post total abdominal hysterectomy, end-stage renal disease on dialysis through left chest port Monday Wednesdays and Fridays presenting with right-sided flank pain  Patient has chronic bilateral hip pain as well as low back pain and sees pain management with periodic injections  9 days ago went for a low back injection and since then patient has been having right-sided flank and rib pain  This occurred after she was transported from the bed by the staff member  Patient has gallbladder removed in the past   Per patient patient does not make any urine  Denies any fever, chills, nausea, vomiting  Pain is constant  Prior to Admission Medications   Prescriptions Last Dose Informant Patient Reported? Taking? Diclofenac Sodium (VOLTAREN) 1 %   Yes No   Sig: Apply 2 g topically daily  Indications: Joint Damage causing Pain and Loss of Function   HYDROmorphone (DILAUDID) 2 mg tablet   Yes No   Sig: Take 2 mg by mouth every 4 (four) hours as needed for moderate pain   LORazepam (ATIVAN) 0 5 mg tablet   Yes No   Sig: Take 0 5 mg by mouth every 8 (eight) hours as needed   Linzess 72 MCG CAPS   Yes No   Sig: Take 1 capsule by mouth daily   Multiple Vitamins-Minerals (ProRenal + D) TABS   Yes No   Sig: Take 1 tablet by mouth every evening  Indications: RENAL FAILURE, CHRONIC   Polyethylene Glycol 3350 (MIRALAX PO)   Yes No   Sig: Take by mouth   acetaminophen (TYLENOL) 500 mg tablet   Yes No   Sig: Take 500 mg by mouth every 6 (six) hours as needed   amLODIPine (NORVASC) 2 5 mg tablet   Yes No   Sig: TAKE 1 TABLET BY MOUTH TWICE A DAY   HOLD MORNING DOSE ON DIALYSIS DAYS   amLODIPine-benazepril (LOTREL 2 5-10) 2 5-10 MG per capsule   Yes No   Sig: Take 1 capsule by mouth daily   Patient not taking: Reported on 1/10/2023   atorvastatin (LIPITOR) 40 mg tablet   Yes No   Sig: Take 1 tablet by mouth every evening   epoetin patricia (Procrit) 2,000 units/mL   Yes No   famotidine (PEPCID) 20 mg tablet   No No   Sig: Take 1 tablet (20 mg total) by mouth 2 (two) times a day as needed (abdominal pain)   hydrOXYzine HCL (ATARAX) 25 mg tablet   Yes No   Sig: Take 25 mg by mouth Three times daily as needed   letrozole (FEMARA) 2 5 mg tablet   Yes No   Sig: Take 2 5 mg by mouth in the morning   levETIRAcetam (KEPPRA) 100 mg/mL oral solution   No No   SimL by mouth every 12 hours  On dialysis days only, take an additional 2 5mL after dialysis  metoprolol succinate (TOPROL-XL) 25 mg 24 hr tablet   Yes No   Sig: Take 25 mg by mouth 2 (two) times a day   Indications: High Blood Pressure Disorder   nitroglycerin (NITROSTAT) 0 4 mg SL tablet   Yes No   Sig: Place 0 4 mg under the tongue   nystatin powder   Yes No   Sig: APPLY TO AFFECTED AREA(S) TWICE A DAY   ondansetron (ZOFRAN) 4 mg tablet   Yes No   Sig: Take 4 mg by mouth every 8 (eight) hours as needed for nausea or vomiting   ondansetron (ZOFRAN-ODT) 4 mg disintegrating tablet   Yes No   Sig: TAKE 1 TABLET BY MOUTH EVERY 8 HOURS AS NEEDED FOR NAUSEA OR VOMITING   pantoprazole (PROTONIX) 20 mg tablet   No No   Sig: Take 1 tablet (20 mg total) by mouth daily   senna-docusate sodium (SENOKOT S) 8 6-50 mg per tablet   No No   Sig: Take 1 tablet by mouth daily   simethicone (MYLICON) 80 mg chewable tablet   No No   Sig: Chew 1 tab po once daily   warfarin (COUMADIN) 5 mg tablet   No No   Sig: Take 1 tablet (5 mg total) by mouth daily      Facility-Administered Medications: None       Past Medical History:   Diagnosis Date   • Breast cancer (HCC)    • Hypertension    • Ovarian cancer ([facility] [address] )    • Renal disorder    • Skin cancer        Past Surgical History:   Procedure Laterality Date   • BREAST SURGERY     •  SECTION     • EXPLORATORY LAPAROTOMY     • GALLBLADDER SURGERY     • HYSTERECTOMY     • IR THORACENTESIS  2022   • IR THORACENTESIS  2023       Family History   Problem Relation Age of Onset   • Pancreatic cancer Mother    • Colon cancer Father      I have reviewed and agree with the history as documented  E-Cigarette/Vaping   • E-Cigarette Use Never User      E-Cigarette/Vaping Substances   • Nicotine No    • THC No    • CBD No    • Flavoring No    • Other No    • Unknown No      Social History     Tobacco Use   • Smoking status: Never   • Smokeless tobacco: Never   Vaping Use   • Vaping Use: Never used   Substance Use Topics   • Alcohol use: Not Currently   • Drug use: Never        Review of Systems   Constitutional: Negative for chills, diaphoresis, fever and unexpected weight change  HENT: Negative for ear pain and sore throat  Eyes: Negative for visual disturbance  Respiratory: Negative for cough, chest tightness and shortness of breath  Right lower rib pain   Cardiovascular: Negative for chest pain and leg swelling  Gastrointestinal: Positive for abdominal pain  Negative for abdominal distention, constipation, diarrhea, nausea and vomiting  Endocrine: Negative  Genitourinary: Positive for flank pain  Negative for difficulty urinating and dysuria  Skin: Negative  Allergic/Immunologic: Negative  Neurological: Negative  Hematological: Negative  Psychiatric/Behavioral: Negative  All other systems reviewed and are negative        Physical Exam  ED Triage Vitals   Temperature Pulse Respirations Blood Pressure SpO2   23 1644 23 1644 23 1644 23 1644 23 1644   98 3 °F (36 8 °C) 67 20 110/74 95 %      Temp Source Heart Rate Source Patient Position - Orthostatic VS BP Location FiO2 (%)   23 1644 23 1644 23 1901 23 1644 -- Oral Monitor Lying Right arm       Pain Score       01/19/23 1856       10 - Worst Possible Pain             Orthostatic Vital Signs  Vitals:    01/19/23 1644 01/19/23 1901   BP: 110/74 121/55   Pulse: 67 62   Patient Position - Orthostatic VS:  Lying       Physical Exam  Vitals and nursing note reviewed  Constitutional:       General: She is not in acute distress  Appearance: Normal appearance  She is not ill-appearing  HENT:      Head: Normocephalic and atraumatic  Right Ear: External ear normal       Left Ear: External ear normal       Nose: Nose normal       Mouth/Throat:      Mouth: Mucous membranes are moist       Pharynx: Oropharynx is clear  Eyes:      General: No scleral icterus  Right eye: No discharge  Left eye: No discharge  Extraocular Movements: Extraocular movements intact  Conjunctiva/sclera: Conjunctivae normal       Pupils: Pupils are equal, round, and reactive to light  Cardiovascular:      Rate and Rhythm: Normal rate and regular rhythm  Pulses: Normal pulses  Heart sounds: Normal heart sounds  Pulmonary:      Effort: Pulmonary effort is normal       Breath sounds: Normal breath sounds  Chest:       Abdominal:      General: Abdomen is flat  Bowel sounds are normal  There is no distension  Palpations: Abdomen is soft  Tenderness: There is no abdominal tenderness  There is right CVA tenderness  There is no guarding or rebound  Musculoskeletal:         General: Normal range of motion  Cervical back: Normal range of motion and neck supple  Skin:     General: Skin is warm and dry  Capillary Refill: Capillary refill takes less than 2 seconds  Neurological:      General: No focal deficit present  Mental Status: She is alert and oriented to person, place, and time  Mental status is at baseline  Psychiatric:         Mood and Affect: Mood normal          Behavior: Behavior normal          Thought Content:  Thought content normal          Judgment: Judgment normal          ED Medications  Medications   HYDROmorphone (DILAUDID) injection 1 mg (1 mg Intravenous Given 1/19/23 1856)   iodixanol (VISIPAQUE) 320 MG/ML injection 100 mL (100 mL Intravenous Given 1/19/23 1916)       Diagnostic Studies  Results Reviewed     Procedure Component Value Units Date/Time    Comprehensive metabolic panel [[id number]]  (Abnormal) Collected: 01/19/23 1848    Lab Status: Final result Specimen: Blood from Arm, Right Updated: 01/19/23 1928     Sodium 139 mmol/L      Potassium 5 3 mmol/L      Chloride 99 mmol/L      CO2 32 mmol/L      ANION GAP 8 mmol/L      BUN 35 mg/dL      Creatinine 3 71 mg/dL      Glucose 153 mg/dL      Calcium 9 0 mg/dL      AST 13 U/L      ALT 8 U/L      Alkaline Phosphatase 132 U/L      Total Protein 6 2 g/dL      Albumin 3 7 g/dL      Total Bilirubin 0 36 mg/dL      eGFR 10 ml/min/1 73sq m     Narrative:      [place] guidelines for Chronic Kidney Disease (CKD):   •  Stage 1 with normal or high GFR (GFR > 90 mL/min/1 73 square meters)  •  Stage 2 Mild CKD (GFR = 60-89 mL/min/1 73 square meters)  •  Stage 3A Moderate CKD (GFR = 45-59 mL/min/1 73 square meters)  •  Stage 3B Moderate CKD (GFR = 30-44 mL/min/1 73 square meters)  •  Stage 4 Severe CKD (GFR = 15-29 mL/min/1 73 square meters)  •  Stage 5 End Stage CKD (GFR <15 mL/min/1 73 square meters)  Note: GFR calculation is accurate only with a steady state creatinine    Lipase [[id number]]  (Normal) Collected: 01/19/23 1848    Lab Status: Final result Specimen: Blood from Arm, Right Updated: 01/19/23 1928     Lipase 58 u/L     CBC and differential [[id number]]  (Abnormal) Collected: 01/19/23 1848    Lab Status: Final result Specimen: Blood from Arm, Right Updated: 01/19/23 1859     WBC 6 49 Thousand/uL      RBC 3 46 Million/uL      Hemoglobin 10 8 g/dL      Hematocrit 34 0 %      MCV 98 fL      MCH 31 2 pg      MCHC 31 8 g/dL      RDW 15 5 %      MPV 10 3 fL      Platelets 581 Thousands/uL      nRBC 0 /100 WBCs      Neutrophils Relative 66 %      Immat GRANS % 1 %      Lymphocytes Relative 24 %      Monocytes Relative 7 %      Eosinophils Relative 2 %      Basophils Relative 0 %      Neutrophils Absolute 4 33 Thousands/µL      Immature Grans Absolute 0 03 Thousand/uL      Lymphocytes Absolute 1 57 Thousands/µL      Monocytes Absolute 0 44 Thousand/µL      Eosinophils Absolute 0 10 Thousand/µL      Basophils Absolute 0 02 Thousands/µL                  CT abdomen pelvis with contrast   Final Result by Francois Rascon MD (01/19 1953)      No acute findings  No significant change from 11/19/2022            Workstation performed: [id number]               Procedures  Procedures      ED Course  ED Course as of 01/21/23 1551   Thu Jan 19, 2023   0794 Procedure Note: EKG  Date/Time: 01/19/23 7:17 PM   Interpreted by: Tor Kee  Indications / Diagnosis: right rib pain  ECG reviewed by me, the ED Provider: yes   The EKG demonstrates:  Rhythm: normal sinus  Intervals: normal intervals  Axis: normal axis  QRS/Blocks: normal QRS  ST Changes: No acute ST Changes, no STD/GUSTAVO  SBIRT 20yo+    Flowsheet Row Most Recent Value   SBIRT (25 yo +)    In order to provide better care to our patients, we are screening all of our patients for alcohol and drug use  Would it be okay to ask you these screening questions?  No Filed at: 01/19/2023 1817                Medical Decision Making   [de-identified]year old female with right lower rib pain as well as RUQ abdominal pain   Will obtain CT AP to rule out rib fracture as well as abdominal labs to rule out abdominal pathology   Patient with no complaints of dysuria on questioning and also states that she no longer produces any urine due to ESRD   Workup negative   Patient given analgesia with complete resolution of symptom   Patient instructed to follow up with pain management doctor for ongoing pain   Return precaution provided     Flank pain: acute illness or injury  Musculoskeletal pain: acute illness or injury  Rib pain on right side: acute illness or injury  Amount and/or Complexity of Data Reviewed  Labs: ordered  Radiology: ordered  Risk  Prescription drug management  Disposition  Final diagnoses:   Flank pain   Rib pain on right side   Musculoskeletal pain     Time reflects when diagnosis was documented in both MDM as applicable and the Disposition within this note     Time User Action Codes Description Comment    1/19/2023  8:12 PM Patrici Tito Add [R10 9] Flank pain     1/19/2023  8:12 PM Patrici Tito Add [R07 81] Rib pain on right side     1/19/2023  8:12 PM Patrici Tito Add [M79 18] Musculoskeletal pain       ED Disposition     ED Disposition   Discharge    Condition   Stable    Date/Time   Thu Jan 19, 2023  8:12 PM    Comment   Kings Bonilla discharge to home/self care  Follow-up Information     Follow up With Specialties Details Why Contact Info Additional Information    Vladislav Love,  Family Medicine Schedule an appointment as soon as possible for a visit   [address] [address] [phone]       [facility] Emergency Department Emergency Medicine Go to  If symptoms worsen [facility] [id number]  [facility] Emergency Department, [address] [place] [address], [id number]          Discharge Medication List as of 1/19/2023  8:13 PM      CONTINUE these medications which have NOT CHANGED    Details   acetaminophen (TYLENOL) 500 mg tablet Take 500 mg by mouth every 6 (six) hours as needed, Starting Fri 3/18/2022, Historical Med      amLODIPine (NORVASC) 2 5 mg tablet TAKE 1 TABLET BY MOUTH TWICE A DAY   HOLD MORNING DOSE ON DIALYSIS DAYS, Historical Med      amLODIPine-benazepril (LOTREL 2 5-10) 2 5-10 MG per capsule Take 1 capsule by mouth daily, Historical Med atorvastatin (LIPITOR) 40 mg tablet Take 1 tablet by mouth every evening, Starting Sun 11/28/2021, Historical Med      Diclofenac Sodium (VOLTAREN) 1 % Apply 2 g topically daily  Indications: Joint Damage causing Pain and Loss of Function, Historical Med      epoetin patricia (Procrit) 2,000 units/mL Historical Med      famotidine (PEPCID) 20 mg tablet Take 1 tablet (20 mg total) by mouth 2 (two) times a day as needed (abdominal pain), Starting Mon 1/9/2023, Normal      HYDROmorphone (DILAUDID) 2 mg tablet Take 2 mg by mouth every 4 (four) hours as needed for moderate pain, Starting Thu 9/8/2022, Historical Med      hydrOXYzine HCL (ATARAX) 25 mg tablet Take 25 mg by mouth Three times daily as needed, Starting Wed 4/13/2022, Historical Med      letrozole [facility]) 2 5 mg tablet Take 2 5 mg by mouth in the morning, Starting Mon 11/29/2021, Historical Med      levETIRAcetam (KEPPRA) 100 mg/mL oral solution 5mL by mouth every 12 hours  On dialysis days only, take an additional 2 5mL after dialysis  , Normal      Linzess 72 MCG CAPS Take 1 capsule by mouth daily, Starting Mon 11/28/2022, Historical Med      LORazepam (ATIVAN) 0 5 mg tablet Take 0 5 mg by mouth every 8 (eight) hours as needed, Starting Thu 9/8/2022, Historical Med      metoprolol succinate (TOPROL-XL) 25 mg 24 hr tablet Take 25 mg by mouth 2 (two) times a day  Indications: High Blood Pressure Disorder, Historical Med      Multiple Vitamins-Minerals (ProRenal + D) TABS Take 1 tablet by mouth every evening   Indications: RENAL FAILURE, CHRONIC, Historical Med      nitroglycerin (NITROSTAT) 0 4 mg SL tablet Place 0 4 mg under the tongue, Starting Fri 4/22/2022, Historical Med      nystatin powder APPLY TO AFFECTED AREA(S) TWICE A DAY, Historical Med      ondansetron (ZOFRAN) 4 mg tablet Take 4 mg by mouth every 8 (eight) hours as needed for nausea or vomiting, Historical Med      ondansetron (ZOFRAN-ODT) 4 mg disintegrating tablet TAKE 1 TABLET BY MOUTH EVERY 8 HOURS AS NEEDED FOR NAUSEA OR VOMITING, Historical Med      pantoprazole (PROTONIX) 20 mg tablet Take 1 tablet (20 mg total) by mouth daily, Starting Mon 1/9/2023, Normal      Polyethylene Glycol 3350 (MIRALAX PO) Take by mouth, Historical Med      senna-docusate sodium (SENOKOT S) 8 6-50 mg per tablet Take 1 tablet by mouth daily, Starting Tue 1/17/2023, Until Thu 2/16/2023, Normal      simethicone (MYLICON) 80 mg chewable tablet Chew 1 tab po once daily, Normal      warfarin (COUMADIN) 5 mg tablet Take 1 tablet (5 mg total) by mouth daily, Starting Thu 9/15/2022, Normal           No discharge procedures on file  PDMP Review       Value Time User    PDMP Reviewed  Yes 11/3/2022 11:01 AM Rm Pina, [address]           ED Provider  Attending physically available and evaluated Basil Marshall  FERNANDO managed the patient along with the ED Attending      Electronically Signed by         Michelle Malik MD  01/21/23 4674

## 2023-01-20 LAB
ATRIAL RATE: 60 BPM
P AXIS: 52 DEGREES
PR INTERVAL: 162 MS
QRS AXIS: 8 DEGREES
QRSD INTERVAL: 92 MS
QT INTERVAL: 432 MS
QTC INTERVAL: 432 MS
T WAVE AXIS: 126 DEGREES
VENTRICULAR RATE: 60 BPM
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
## 2023-01-31 ENCOUNTER — PROCEDURE VISIT (OUTPATIENT)
Dept: UROLOGY | Facility: CLINIC | Age: 83
End: 2023-01-31

## 2023-01-31 VITALS — HEIGHT: 64 IN | HEART RATE: 72 BPM | BODY MASS INDEX: 22.53 KG/M2 | WEIGHT: 132 LBS

## 2023-01-31 DIAGNOSIS — R39.89 BLADDER PAIN: Primary | ICD-10-CM

## 2023-01-31 NOTE — PROGRESS NOTES
Patient on chronic hemodialysis  She has extensive medical history including GYN malignancies  Her daughter says that she has been complaining of pelvic discomfort intermittently  She had occasional whitish discharge  She has not seen gynecologist recently  She has managed by chronic pain and takes Dilaudid daily  She takes medication to avoid constipation  CT scan was performed did not reveal any abnormality  Cystoscopy     Date/Time 1/31/2023 8:40 AM     Performed by  Diaz Dupree MD     Authorized by Diaz Dupree MD          Procedure Details:  Procedure type: cystoscopy    Additional Procedure Details: We discussed the possibility of pyocystis  I prepped the genitalia with chlorhexidine and placed a Stevens catheter  I irrigated her bladder with 200 mL sterile saline  There is no significant cloudy return or evidence of abnormality  The catheter was removed  I then prepped the genitalia again with chlorhexidine  The 16 French flexible cystoscope was placed  Bladder was evaluated  Mucosa is essentially normal   There is no suspicious mass or lesion  There is no evidence of debris, stone, or other abnormality  Plan  Patient and her daughter were reassured that there is no abnormality  Recommend continue with pain control as needed  As previously recommended, may proceed with gynecology evaluation as well for completeness  Continue with routine medical care and nephrology management  Follow-up as needed

## 2023-01-31 NOTE — LETTER
January 31, 2023     Jeannette Nuno, [address] [facility]  [address]  [facility] [address] [address]    Patient: Natalya Olmedo   YOB: 1940   Date of Visit: 1/31/2023       Dear Dr Vahe Falcon: Thank you for referring Natalya Olmedo to me for evaluation  Below are my notes for this consultation  If you have questions, please do not hesitate to call me  I look forward to following your patient along with you  Sincerely,        Lisseth Couch MD        CC: No Recipients  Lisseth Couch MD  1/31/2023  8:41 AM  Sign when Signing Visit  Patient on chronic hemodialysis  She has extensive medical history including GYN malignancies  Her daughter says that she has been complaining of pelvic discomfort intermittently  She had occasional whitish discharge  She has not seen gynecologist recently  She has managed by chronic pain and takes Dilaudid daily  She takes medication to avoid constipation  CT scan was performed did not reveal any abnormality  Cystoscopy     Date/Time 1/31/2023 8:40 AM     Performed by  Lisseth Couch MD     Authorized by Lisseth Couch MD          Procedure Details:  Procedure type: cystoscopy    Additional Procedure Details: We discussed the possibility of pyocystis  I prepped the genitalia with chlorhexidine and placed a Stevens catheter  I irrigated her bladder with 200 mL sterile saline  There is no significant cloudy return or evidence of abnormality  The catheter was removed  I then prepped the genitalia again with chlorhexidine  The 16 French flexible cystoscope was placed  Bladder was evaluated  Mucosa is essentially normal   There is no suspicious mass or lesion  There is no evidence of debris, stone, or other abnormality  Plan  Patient and her daughter were reassured that there is no abnormality  Recommend continue with pain control as needed    As previously recommended, may proceed with gynecology evaluation as well for completeness  Continue with routine medical care and nephrology management  Follow-up as needed

## 2023-02-01 ENCOUNTER — NURSE TRIAGE (OUTPATIENT)
Dept: OTHER | Facility: OTHER | Age: 83
End: 2023-02-01

## 2023-02-01 NOTE — TELEPHONE ENCOUNTER
Patient's EC, Vasyl Kunz called in to report bleeding post cystoscopy on 1/31/23  Patient is on dialysis; no urine eliminated  Patient takes Coumadin  Blood is not dark or bright red on paper post wiping  Assured patient that bleeding post procedure is expected; usually randall colored when mixed with urine  Since patient produces no urine and on blood thinner; please follow up with Vasyl Kunz for additional care advise and parameters to call back  Reason for Disposition  • Caller has URGENT question and triager unable to answer question    Answer Assessment - Initial Assessment Questions  1  SYMPTOM: "What's the main symptom you're concerned about?" (e g , pain, fever, vomiting)      No urine post dialysis; on warfarin; still has bleeding (not dark or bright); just on paper when wiping herself  2  ONSET: "When did symptoms start?"      Since procedure on 1/31/23  3  SURGERY: "What surgery was performed?"      Cystoscopy  4  DATE of SURGERY: "When was surgery performed?"       1/31/23  5  ANESTHESIA: " What type of anesthesia did you have?" (e g , general, spinal, epidural, local)      Local  6  PAIN: "Is there any pain?" If Yes, ask: "How bad is it?"  (Scale 1-10; or mild, moderate, severe)      Denies  7  FEVER: "Do you have a fever?" If Yes, ask: "What is your temperature, how was it measured, and when did it start?"      Denies  8  VOMITING: "Is there any vomiting?" If yes, ask: "How many times?"     Denies  9  BLEEDING: "Is there any bleeding?" If Yes, ask: "How much?" and "Where?"      Just bleeding   10   OTHER SYMPTOMS: "Do you have any other symptoms?" (e g , drainage from wound, painful urination, constipation)        Denies    Protocols used: POST-OP SYMPTOMS AND QUESTIONS-ADULT-OH

## 2023-02-01 NOTE — TELEPHONE ENCOUNTER
Regarding: Blood in urine  ----- Message from Lisa Gutierres RN sent at 2/1/2023 12:25 PM EST -----  "My mother had a cystoscopy done yesterday by Dr Javier Gentile and she is still having some bleeding "

## 2023-02-01 NOTE — TELEPHONE ENCOUNTER
Agree with recommendations  Should monitor urine  If there is no fevers or chills or symptoms of infection no need for any testing at this time    She should be hydrating well with water

## 2023-02-15 ENCOUNTER — TELEPHONE (OUTPATIENT)
Dept: PAIN MEDICINE | Facility: MEDICAL CENTER | Age: 83
End: 2023-02-15

## 2023-02-15 NOTE — TELEPHONE ENCOUNTER
Caller: patient    Doctor: Patti December    Reason for call: would like to schedule a procedure    Call back#:

## 2023-02-15 NOTE — TELEPHONE ENCOUNTER
S/w pt's daughter, Maddie Rivers, who is asking for b/l hip inj for pt  Pt has Rt hip inj 11/8/22 KASSY and Lt hip inj 12/28/22 JW    Current pain level: 6-7/10 B/L    Percentage relief from previous inj: 80% B/L    Duration of pain relief from previous inj: 6wks    Same pain as prior to inj: yes    Please advise

## 2023-02-15 NOTE — TELEPHONE ENCOUNTER
The patient can be scheduled for right intra-articular hip injection since it has been 3 months    However, the earliest we could do left hip injection would be March 28, 2022

## 2023-02-27 DIAGNOSIS — K59.00 CONSTIPATION, UNSPECIFIED CONSTIPATION TYPE: Primary | ICD-10-CM

## 2023-02-27 DIAGNOSIS — I82.622 ACUTE DEEP VEIN THROMBOSIS (DVT) OF LEFT UPPER EXTREMITY, UNSPECIFIED VEIN (HCC): ICD-10-CM

## 2023-02-27 DIAGNOSIS — R56.9 SEIZURE (HCC): ICD-10-CM

## 2023-02-27 DIAGNOSIS — R10.13 EPIGASTRIC PAIN: ICD-10-CM

## 2023-02-27 DIAGNOSIS — E78.2 MIXED HYPERLIPIDEMIA: ICD-10-CM

## 2023-02-27 RX ORDER — LETROZOLE 2.5 MG/1
2.5 TABLET, FILM COATED ORAL DAILY
Status: CANCELLED | OUTPATIENT
Start: 2023-02-27

## 2023-02-27 RX ORDER — PANTOPRAZOLE SODIUM 20 MG/1
20 TABLET, DELAYED RELEASE ORAL DAILY
Qty: 60 TABLET | Refills: 11 | Status: SHIPPED | OUTPATIENT
Start: 2023-02-27

## 2023-02-27 RX ORDER — WARFARIN SODIUM 5 MG/1
5 TABLET ORAL
Qty: 30 TABLET | Refills: 0 | Status: SHIPPED | OUTPATIENT
Start: 2023-02-27

## 2023-02-27 RX ORDER — LEVETIRACETAM 100 MG/ML
SOLUTION ORAL
Qty: 473 ML | Refills: 3 | Status: SHIPPED | OUTPATIENT
Start: 2023-02-27

## 2023-02-27 RX ORDER — LINACLOTIDE 72 UG/1
1 CAPSULE, GELATIN COATED ORAL DAILY
Status: CANCELLED | OUTPATIENT
Start: 2023-02-27

## 2023-02-27 RX ORDER — METOPROLOL SUCCINATE 25 MG/1
25 TABLET, EXTENDED RELEASE ORAL 2 TIMES DAILY
Qty: 180 TABLET | Refills: 1 | Status: SHIPPED | OUTPATIENT
Start: 2023-02-27

## 2023-02-27 RX ORDER — ATORVASTATIN CALCIUM 40 MG/1
40 TABLET, FILM COATED ORAL EVERY EVENING
Qty: 90 TABLET | Refills: 1 | Status: SHIPPED | OUTPATIENT
Start: 2023-02-27

## 2023-02-27 RX ORDER — AMOXICILLIN 250 MG
1 CAPSULE ORAL DAILY
Qty: 30 TABLET | Refills: 1 | Status: SHIPPED | OUTPATIENT
Start: 2023-02-27 | End: 2023-03-14

## 2023-02-27 RX ORDER — ONDANSETRON 4 MG/1
4 TABLET, FILM COATED ORAL EVERY 8 HOURS PRN
Qty: 20 TABLET | Refills: 0 | Status: SHIPPED | OUTPATIENT
Start: 2023-02-27

## 2023-02-27 RX ORDER — FAMOTIDINE 20 MG/1
20 TABLET, FILM COATED ORAL 2 TIMES DAILY PRN
Qty: 60 TABLET | Refills: 11 | Status: SHIPPED | OUTPATIENT
Start: 2023-02-27

## 2023-02-27 NOTE — TELEPHONE ENCOUNTER
Pt's daughter called requesting 90 day subscription for each medication  Pt is also requesting accucheck guide for glucose

## 2023-02-27 NOTE — TELEPHONE ENCOUNTER
Please call pt's dtr:  1  I sent in refills on toprol and lipitor  2  I did not send in refills on linzess or femara - it appears that these have been filled elsewhere in past   3   patient needs to have fasting lipids - so next time she gets bloodwork done - have her get lipid panel (order in chart)  4  Is pt using voltaren gel - and if so - on what body parts and how often? Does she need refill?   Thanks

## 2023-03-02 DIAGNOSIS — M16.11 OSTEOARTHRITIS OF RIGHT HIP: Primary | ICD-10-CM

## 2023-03-02 DIAGNOSIS — M16.12 OSTEOARTHRITIS OF LEFT HIP: Primary | ICD-10-CM

## 2023-03-07 NOTE — TELEPHONE ENCOUNTER
Spoke to pt's daughter regarding your notes for her medications  She said she does use the Voltaren gel on her mom's back and left hip about 2-3 times daily or whenever she feels pain  She also said she will get the other two meds from another place  Please advise, thank you!

## 2023-03-09 ENCOUNTER — HOSPITAL ENCOUNTER (INPATIENT)
Facility: HOSPITAL | Age: 83
LOS: 5 days | Discharge: HOME | End: 2023-03-14
Attending: EMERGENCY MEDICINE | Admitting: STUDENT IN AN ORGANIZED HEALTH CARE EDUCATION/TRAINING PROGRAM

## 2023-03-09 ENCOUNTER — APPOINTMENT (INPATIENT)
Dept: CT IMAGING | Facility: HOSPITAL | Age: 83
End: 2023-03-09

## 2023-03-09 ENCOUNTER — APPOINTMENT (EMERGENCY)
Dept: CT IMAGING | Facility: HOSPITAL | Age: 83
End: 2023-03-09

## 2023-03-09 DIAGNOSIS — N18.6 ESRD ON DIALYSIS (HCC): ICD-10-CM

## 2023-03-09 DIAGNOSIS — R10.9 ABDOMINAL PAIN: ICD-10-CM

## 2023-03-09 DIAGNOSIS — Z99.2 ESRD ON DIALYSIS (HCC): ICD-10-CM

## 2023-03-09 DIAGNOSIS — K35.33 ABSCESS, PERIAPPENDICEAL: Primary | ICD-10-CM

## 2023-03-09 LAB
ALBUMIN SERPL BCP-MCNC: 3.8 G/DL (ref 3.5–5)
ALP SERPL-CCNC: 138 U/L (ref 34–104)
ALT SERPL W P-5'-P-CCNC: 8 U/L (ref 7–52)
ANION GAP SERPL CALCULATED.3IONS-SCNC: 9 MMOL/L (ref 4–13)
AST SERPL W P-5'-P-CCNC: 11 U/L (ref 13–39)
BASOPHILS # BLD AUTO: 0.03 THOUSANDS/ÂΜL (ref 0–0.1)
BASOPHILS NFR BLD AUTO: 0 % (ref 0–1)
BILIRUB SERPL-MCNC: 0.49 MG/DL (ref 0.2–1)
BUN SERPL-MCNC: 12 MG/DL (ref 5–25)
CALCIUM SERPL-MCNC: 9.6 MG/DL (ref 8.4–10.2)
CHLORIDE SERPL-SCNC: 99 MMOL/L (ref 96–108)
CO2 SERPL-SCNC: 31 MMOL/L (ref 21–32)
CREAT SERPL-MCNC: 2.67 MG/DL (ref 0.6–1.3)
EOSINOPHIL # BLD AUTO: 0.09 THOUSAND/ÂΜL (ref 0–0.61)
EOSINOPHIL NFR BLD AUTO: 1 % (ref 0–6)
ERYTHROCYTE [DISTWIDTH] IN BLOOD BY AUTOMATED COUNT: 13.4 % (ref 11.6–15.1)
FLUAV RNA RESP QL NAA+PROBE: NEGATIVE
FLUBV RNA RESP QL NAA+PROBE: NEGATIVE
GFR SERPL CREATININE-BSD FRML MDRD: 16 ML/MIN/1.73SQ M
GLUCOSE SERPL-MCNC: 104 MG/DL (ref 65–140)
GLUCOSE SERPL-MCNC: 110 MG/DL (ref 65–140)
GLUCOSE SERPL-MCNC: 138 MG/DL (ref 65–140)
GLUCOSE SERPL-MCNC: 145 MG/DL (ref 65–140)
HCT VFR BLD AUTO: 34.1 % (ref 34.8–46.1)
HGB BLD-MCNC: 10.8 G/DL (ref 11.5–15.4)
IMM GRANULOCYTES # BLD AUTO: 0.04 THOUSAND/UL (ref 0–0.2)
IMM GRANULOCYTES NFR BLD AUTO: 0 % (ref 0–2)
INR PPP: 1.29 (ref 0.84–1.19)
INR PPP: 3.13 (ref 0.84–1.19)
LACTATE SERPL-SCNC: 1.4 MMOL/L (ref 0.5–2)
LIPASE SERPL-CCNC: 14 U/L (ref 11–82)
LYMPHOCYTES # BLD AUTO: 1.27 THOUSANDS/ÂΜL (ref 0.6–4.47)
LYMPHOCYTES NFR BLD AUTO: 12 % (ref 14–44)
MCH RBC QN AUTO: 31 PG (ref 26.8–34.3)
MCHC RBC AUTO-ENTMCNC: 31.7 G/DL (ref 31.4–37.4)
MCV RBC AUTO: 98 FL (ref 82–98)
MONOCYTES # BLD AUTO: 0.67 THOUSAND/ÂΜL (ref 0.17–1.22)
MONOCYTES NFR BLD AUTO: 6 % (ref 4–12)
NEUTROPHILS # BLD AUTO: 8.43 THOUSANDS/ÂΜL (ref 1.85–7.62)
NEUTS SEG NFR BLD AUTO: 81 % (ref 43–75)
NRBC BLD AUTO-RTO: 0 /100 WBCS
PLATELET # BLD AUTO: 212 THOUSANDS/UL (ref 149–390)
PMV BLD AUTO: 10.7 FL (ref 8.9–12.7)
POTASSIUM SERPL-SCNC: 4.6 MMOL/L (ref 3.5–5.3)
PROT SERPL-MCNC: 6.8 G/DL (ref 6.4–8.4)
PROTHROMBIN TIME: 16.1 SECONDS (ref 11.6–14.5)
PROTHROMBIN TIME: 32 SECONDS (ref 11.6–14.5)
RBC # BLD AUTO: 3.48 MILLION/UL (ref 3.81–5.12)
RSV RNA RESP QL NAA+PROBE: NEGATIVE
SARS-COV-2 RNA RESP QL NAA+PROBE: NEGATIVE
SODIUM SERPL-SCNC: 139 MMOL/L (ref 135–147)
WBC # BLD AUTO: 10.53 THOUSAND/UL (ref 4.31–10.16)

## 2023-03-09 RX ORDER — METOPROLOL SUCCINATE 25 MG/1
25 TABLET, EXTENDED RELEASE ORAL 2 TIMES DAILY
Status: DISCONTINUED | OUTPATIENT
Start: 2023-03-09 | End: 2023-03-14 | Stop reason: HOSPADM

## 2023-03-09 RX ORDER — IODIXANOL 320 MG/ML
100 INJECTION, SOLUTION INTRAVASCULAR
Status: COMPLETED | OUTPATIENT
Start: 2023-03-09 | End: 2023-03-09

## 2023-03-09 RX ORDER — ACETAMINOPHEN 325 MG/1
650 TABLET ORAL EVERY 6 HOURS PRN
Status: DISCONTINUED | OUTPATIENT
Start: 2023-03-09 | End: 2023-03-14 | Stop reason: HOSPADM

## 2023-03-09 RX ORDER — FAMOTIDINE 20 MG/1
20 TABLET, FILM COATED ORAL 2 TIMES DAILY PRN
Status: DISCONTINUED | OUTPATIENT
Start: 2023-03-09 | End: 2023-03-14 | Stop reason: HOSPADM

## 2023-03-09 RX ORDER — WARFARIN SODIUM 5 MG/1
5 TABLET ORAL
Status: CANCELLED | OUTPATIENT
Start: 2023-03-09

## 2023-03-09 RX ORDER — MAGNESIUM HYDROXIDE/ALUMINUM HYDROXICE/SIMETHICONE 120; 1200; 1200 MG/30ML; MG/30ML; MG/30ML
30 SUSPENSION ORAL EVERY 6 HOURS PRN
Status: DISCONTINUED | OUTPATIENT
Start: 2023-03-09 | End: 2023-03-13

## 2023-03-09 RX ORDER — PANTOPRAZOLE SODIUM 20 MG/1
20 TABLET, DELAYED RELEASE ORAL
Status: DISCONTINUED | OUTPATIENT
Start: 2023-03-09 | End: 2023-03-14 | Stop reason: HOSPADM

## 2023-03-09 RX ORDER — INSULIN LISPRO 100 [IU]/ML
1-5 INJECTION, SOLUTION INTRAVENOUS; SUBCUTANEOUS EVERY 6 HOURS SCHEDULED
Status: DISCONTINUED | OUTPATIENT
Start: 2023-03-09 | End: 2023-03-10

## 2023-03-09 RX ORDER — LEVETIRACETAM 100 MG/ML
250 SOLUTION ORAL DAILY PRN
Status: DISCONTINUED | OUTPATIENT
Start: 2023-03-09 | End: 2023-03-14 | Stop reason: HOSPADM

## 2023-03-09 RX ORDER — LEVETIRACETAM 100 MG/ML
500 SOLUTION ORAL EVERY 12 HOURS SCHEDULED
Status: DISCONTINUED | OUTPATIENT
Start: 2023-03-09 | End: 2023-03-14 | Stop reason: HOSPADM

## 2023-03-09 RX ORDER — ATORVASTATIN CALCIUM 40 MG/1
40 TABLET, FILM COATED ORAL EVERY EVENING
Status: DISCONTINUED | OUTPATIENT
Start: 2023-03-09 | End: 2023-03-14 | Stop reason: HOSPADM

## 2023-03-09 RX ORDER — LUBIPROSTONE 24 UG/1
24 CAPSULE ORAL 2 TIMES DAILY WITH MEALS
Status: DISCONTINUED | OUTPATIENT
Start: 2023-03-09 | End: 2023-03-12

## 2023-03-09 RX ORDER — AMOXICILLIN 250 MG
1 CAPSULE ORAL DAILY
Status: DISCONTINUED | OUTPATIENT
Start: 2023-03-09 | End: 2023-03-10

## 2023-03-09 RX ORDER — MIDODRINE HYDROCHLORIDE 5 MG/1
5 TABLET ORAL
Status: DISCONTINUED | OUTPATIENT
Start: 2023-03-09 | End: 2023-03-14 | Stop reason: HOSPADM

## 2023-03-09 RX ORDER — FENTANYL CITRATE 50 UG/ML
25 INJECTION, SOLUTION INTRAMUSCULAR; INTRAVENOUS ONCE
Status: COMPLETED | OUTPATIENT
Start: 2023-03-09 | End: 2023-03-09

## 2023-03-09 RX ORDER — ACETAMINOPHEN 325 MG/1
650 TABLET ORAL EVERY 4 HOURS PRN
Status: DISCONTINUED | OUTPATIENT
Start: 2023-03-09 | End: 2023-03-09 | Stop reason: SDUPTHER

## 2023-03-09 RX ORDER — SODIUM CHLORIDE, SODIUM LACTATE, POTASSIUM CHLORIDE, CALCIUM CHLORIDE 600; 310; 30; 20 MG/100ML; MG/100ML; MG/100ML; MG/100ML
75 INJECTION, SOLUTION INTRAVENOUS CONTINUOUS
Status: DISCONTINUED | OUTPATIENT
Start: 2023-03-09 | End: 2023-03-10

## 2023-03-09 RX ORDER — HYDROMORPHONE HYDROCHLORIDE 2 MG/1
2 TABLET ORAL EVERY 4 HOURS PRN
Status: DISCONTINUED | OUTPATIENT
Start: 2023-03-09 | End: 2023-03-09

## 2023-03-09 RX ORDER — ONDANSETRON 2 MG/ML
4 INJECTION INTRAMUSCULAR; INTRAVENOUS EVERY 6 HOURS PRN
Status: DISCONTINUED | OUTPATIENT
Start: 2023-03-09 | End: 2023-03-14 | Stop reason: HOSPADM

## 2023-03-09 RX ORDER — HYDROMORPHONE HCL 110MG/55ML
2 PATIENT CONTROLLED ANALGESIA SYRINGE INTRAVENOUS EVERY 4 HOURS PRN
Status: DISCONTINUED | OUTPATIENT
Start: 2023-03-09 | End: 2023-03-09

## 2023-03-09 RX ORDER — LORAZEPAM 0.5 MG/1
0.5 TABLET ORAL EVERY 8 HOURS PRN
Status: DISCONTINUED | OUTPATIENT
Start: 2023-03-09 | End: 2023-03-14 | Stop reason: HOSPADM

## 2023-03-09 RX ORDER — AMLODIPINE BESYLATE 2.5 MG/1
2.5 TABLET ORAL DAILY
Status: DISCONTINUED | OUTPATIENT
Start: 2023-03-09 | End: 2023-03-09

## 2023-03-09 RX ORDER — LETROZOLE 2.5 MG/1
2.5 TABLET, FILM COATED ORAL DAILY
Status: DISCONTINUED | OUTPATIENT
Start: 2023-03-09 | End: 2023-03-14 | Stop reason: HOSPADM

## 2023-03-09 RX ORDER — ONDANSETRON 2 MG/ML
4 INJECTION INTRAMUSCULAR; INTRAVENOUS ONCE
Status: COMPLETED | OUTPATIENT
Start: 2023-03-09 | End: 2023-03-09

## 2023-03-09 RX ORDER — SIMETHICONE 80 MG
80 TABLET,CHEWABLE ORAL DAILY
Status: DISCONTINUED | OUTPATIENT
Start: 2023-03-09 | End: 2023-03-14 | Stop reason: HOSPADM

## 2023-03-09 RX ORDER — HYDROMORPHONE HCL 110MG/55ML
2 PATIENT CONTROLLED ANALGESIA SYRINGE INTRAVENOUS EVERY 4 HOURS PRN
Status: DISCONTINUED | OUTPATIENT
Start: 2023-03-09 | End: 2023-03-14 | Stop reason: HOSPADM

## 2023-03-09 RX ADMIN — FENTANYL CITRATE 25 MCG: 50 INJECTION INTRAMUSCULAR; INTRAVENOUS at 02:01

## 2023-03-09 RX ADMIN — PHYTONADIONE 10 MG: 10 INJECTION, EMULSION INTRAMUSCULAR; INTRAVENOUS; SUBCUTANEOUS at 13:05

## 2023-03-09 RX ADMIN — PIPERACILLIN AND TAZOBACTAM 2.25 G: 2; .25 INJECTION, POWDER, LYOPHILIZED, FOR SOLUTION INTRAVENOUS at 14:51

## 2023-03-09 RX ADMIN — MIDODRINE HYDROCHLORIDE 5 MG: 5 TABLET ORAL at 17:11

## 2023-03-09 RX ADMIN — PIPERACILLIN AND TAZOBACTAM 2.25 G: 2; .25 INJECTION, POWDER, LYOPHILIZED, FOR SOLUTION INTRAVENOUS at 21:05

## 2023-03-09 RX ADMIN — LEVETIRACETAM 500 MG: 100 SOLUTION ORAL at 12:51

## 2023-03-09 RX ADMIN — MIDODRINE HYDROCHLORIDE 5 MG: 5 TABLET ORAL at 12:51

## 2023-03-09 RX ADMIN — LUBIPROSTONE 24 MCG: 24 CAPSULE, GELATIN COATED ORAL at 17:11

## 2023-03-09 RX ADMIN — PROTHROMBIN, COAGULATION FACTOR VII HUMAN, COAGULATION FACTOR IX HUMAN, COAGULATION FACTOR X HUMAN, PROTEIN C, PROTEIN S HUMAN, AND WATER 1500 UNITS: KIT at 11:55

## 2023-03-09 RX ADMIN — METOPROLOL SUCCINATE 25 MG: 25 TABLET, EXTENDED RELEASE ORAL at 21:04

## 2023-03-09 RX ADMIN — HYDROMORPHONE HYDROCHLORIDE 2 MG: 2 INJECTION INTRAMUSCULAR; INTRAVENOUS; SUBCUTANEOUS at 20:08

## 2023-03-09 RX ADMIN — ONDANSETRON HYDROCHLORIDE 4 MG: 2 SOLUTION INTRAMUSCULAR; INTRAVENOUS at 01:55

## 2023-03-09 RX ADMIN — LEVETIRACETAM 500 MG: 100 SOLUTION ORAL at 21:04

## 2023-03-09 RX ADMIN — IODIXANOL 100 ML: 320 INJECTION, SOLUTION INTRAVASCULAR at 03:05

## 2023-03-09 RX ADMIN — SODIUM CHLORIDE, SODIUM LACTATE, POTASSIUM CHLORIDE, AND CALCIUM CHLORIDE 500 ML: .6; .31; .03; .02 INJECTION, SOLUTION INTRAVENOUS at 06:08

## 2023-03-09 RX ADMIN — ONDANSETRON 4 MG: 2 INJECTION INTRAMUSCULAR; INTRAVENOUS at 19:38

## 2023-03-09 RX ADMIN — SODIUM CHLORIDE 500 ML: 0.9 INJECTION, SOLUTION INTRAVENOUS at 07:54

## 2023-03-09 RX ADMIN — PIPERACILLIN AND TAZOBACTAM 2.25 G: 2; .25 INJECTION, POWDER, LYOPHILIZED, FOR SOLUTION INTRAVENOUS at 05:31

## 2023-03-09 RX ADMIN — ATORVASTATIN CALCIUM 40 MG: 40 TABLET, FILM COATED ORAL at 17:11

## 2023-03-09 RX ADMIN — SODIUM CHLORIDE, SODIUM LACTATE, POTASSIUM CHLORIDE, AND CALCIUM CHLORIDE 75 ML/HR: .6; .31; .03; .02 INJECTION, SOLUTION INTRAVENOUS at 14:10

## 2023-03-09 NOTE — H&P
[facility]  H&P- Mia Lobo 1940, 82 y o  female MRN: [id number]  Unit/Bed#: ED-15 Encounter: [id number]  Primary Care Provider: Eric Leonard DO   Date and time admitted to hospital: 3/9/2023 12:11 AM    * Abscess, periappendiceal  Assessment & Plan  · Patient presented to the ED with her daughter following 3 weeks of post-prandial stomach pain and occasional vomiting  Daughter reports patient had appointment with GI soon, but for the last two days, the patient has not been able to tolerate any oral intake and so not able to keep down medications  · Patient reports after eating pain 10/10 epigastric pain that radiates into right lower quadrant area  · CT abdomen/pelvis demonstrating "3 4 cm rim-enhancing gas and fluid collection at the right lower quadrant adjacent to the appendiceal tip presumed to represent a periappendiceal abscess  Extensive mesenteric vessel disease as detailed above which is not significantly changed from prior exams  "  · Patient not fulfilling septic criteria with WBC 10 53 and afebrile  · Lipase 14  · Lactic acid 1 4  · Continue supportive care, pain management   · Will keep patient NPO for now   · Gentle fluid hydration  · Surgery consulted, recommendations appreciated  · No surgical intervention at this time  · Zosyn initiated in ED, continue   · IR consult for drainage of abscess    Iron deficiency anemia  Assessment & Plan  · Hgb 10 8, approximately at baseline  · Currently on epoetin   · Continue to monitor    Continuous opioid dependence ([facility] [address] )  Assessment & Plan  · Patient currently on 2 mg hydromorphone prn  · PDMP reviewed  · Followed outpatient by pain management  · Continue pain management     Seizure [facility])  Assessment & Plan  · Continue Keppra 500mg b i d with additional 250mg after each dialysis session  · Patient receives dialysis Monday, Wednesday, Fridays    Breast cancer [facility])  Assessment & Plan  · Patient with extensive history of ob/gyn related cancers  · 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/p radiation in 2018  · Ovarian cancer s/p total abdominal hysterectomy  · Followed outpatient by hem/onc, pain management    Type 2 diabetes mellitus with chronic kidney disease on chronic dialysis, with long-term current use of insulin [facility])  Assessment & Plan  Lab Results   Component Value Date    HGBA1C 5 7 09/01/2022       No results for input(s): POCGLU in the last 72 hours  Blood Sugar Average: Last 72 hrs:  · SSI with accucheks    History of DVT (deep vein thrombosis)  Assessment & Plan  · Patient last admitted in September 2022 with DVT in left subclavian artery while on low dose of eliquis 2 5 mg b i d  · At that time, hematology recommended patient switch to 5mg coumadin daily   · POA INR 3 13, continue to monitor    Gastroesophageal reflux disease  Assessment & Plan  · Patient currently on pepcid and protonix  · Follows outpatient with GI    ESRD on dialysis [facility])  Assessment & Plan  Lab Results   Component Value Date    EGFR 16 03/09/2023    EGFR 10 01/19/2023    EGFR 13 11/19/2022    CREATININE 2 67 (H) 03/09/2023    CREATININE 3 71 (H) 01/19/2023    CREATININE 3 20 (H) 11/19/2022   · Patient currently on Monday, Wednesday, Friday HD schedule  · Daughter reports patient last received HD yesterday prior to coming to the ER  · Nephrology consulted    Chronic atrial fibrillation ([facility] [address] )  Assessment & Plan  · Patient anticoagulated on 5mg coumadin daily  · POA INR 3 13, continue to monitor   · Continue metoprolol and amlodipine  · Hold morning dose of amlodipine on dialysis days      VTE Pharmacologic Prophylaxis: VTE Score: 7 High Risk (Score >/= 5) - Pharmacological DVT Prophylaxis Ordered: heparin  Sequential Compression Devices Ordered  Code Status: Full code  Discussion with family: Updated  (daughter) at bedside      Anticipated Length of Stay: Patient will be admitted on an inpatient basis with an anticipated length of stay of greater than 2 midnights secondary to periappendiceal abscess  Total Time Spent on Date of Encounter in care of patient: 75 minutes This time was spent on one or more of the following: performing physical exam; counseling and coordination of care; obtaining or reviewing history; documenting in the medical record; reviewing/ordering tests, medications or procedures; communicating with other healthcare professionals and discussing with patient's family/caregivers  Chief Complaint: "I cannot eat anymore"    History of Present Illness:  Husam Estrada is a 82 y o  female who presents with increased postprandial pain and occasional N/V  Patient reports that over the last three weeks, she has been having more and more pain after she eats  She reports that it normally starts 10-15 minutes after eating and then continues for hours until gradually reducing  She was scheduled with outpatient GI follow-up, but the last two days the patient has not been able to tolerate any food, water, or even her medications orally without severe postprandial pain and vomiting  Daughter was concerned that patient has not been able to keep her medications down, so decided it was time to seek emergent care  Patient reports that the pain is epigastric, but radiates into the RLQ/suprapubic region  She reports besides this, the patient is not complaining of any other symptoms such as fevers, chills, myalgias, headache, melena, hematochezia, dysuria, flank pain, diarrhea, or constipation  Review of Systems:  Review of Systems   Constitutional: Negative for appetite change, chills, diaphoresis, fatigue and fever  HENT: Negative for congestion, rhinorrhea and sore throat  Eyes: Negative for photophobia and visual disturbance  Respiratory: Negative for cough, shortness of breath and wheezing  Cardiovascular: Negative for chest pain, palpitations and leg swelling     Gastrointestinal: Positive for abdominal pain (epigastric radiating into RLQ), nausea and vomiting (occasional, postprandial)  Negative for abdominal distention, blood in stool, constipation and diarrhea  Genitourinary: Negative for dysuria and hematuria  Musculoskeletal: Negative for arthralgias, back pain, myalgias and neck stiffness  Skin: Negative for color change and rash  Neurological: Negative for dizziness, seizures, syncope, weakness, light-headedness and headaches  Psychiatric/Behavioral: Negative for agitation, behavioral problems and confusion  The patient is not nervous/anxious  All other systems reviewed and are negative  Past Medical and Surgical History:   Past Medical History:   Diagnosis Date   • Breast cancer ([facility] )    • Hypertension    • Ovarian cancer ([facility] )    • Renal disorder    • Skin cancer        Past Surgical History:   Procedure Laterality Date   • BREAST SURGERY     •  SECTION     • EXPLORATORY LAPAROTOMY     • GALLBLADDER SURGERY     • HYSTERECTOMY     • IR THORACENTESIS  2022   • IR THORACENTESIS  2023       Meds/Allergies:  Prior to Admission medications    Medication Sig Start Date End Date Taking? Authorizing Provider   acetaminophen (TYLENOL) 500 mg tablet Take 500 mg by mouth every 6 (six) hours as needed 3/18/22   Historical Provider, MD   amLODIPine (NORVASC) 2 5 mg tablet TAKE 1 TABLET BY MOUTH TWICE A DAY  HOLD MORNING DOSE ON DIALYSIS DAYS 22   Historical Provider, MD   amLODIPine-benazepril (LOTREL 2 5-10) 2 5-10 MG per capsule Take 1 capsule by mouth daily  Patient not taking: Reported on 1/10/2023    Historical Provider, MD   atorvastatin (LIPITOR) 40 mg tablet Take 1 tablet (40 mg total) by mouth every evening 23   Peri Hill,    Diclofenac Sodium (VOLTAREN) 1 % Apply 2 g topically daily   Indications: Joint Damage causing Pain and Loss of Function    Historical Provider, MD   epoetin patricia (Procrit) 2,000 units/mL     Historical Provider, MD   famotidine (PEPCID) 20 mg tablet Take 1 tablet (20 mg total) by mouth 2 (two) times a day as needed (abdominal pain) 2/27/23   Adarsh Pearson MD   HYDROmorphone (DILAUDID) 2 mg tablet Take 2 mg by mouth every 4 (four) hours as needed for moderate pain 9/8/22   Historical Provider, MD   hydrOXYzine HCL (ATARAX) 25 mg tablet Take 25 mg by mouth Three times daily as needed 4/13/22   Historical Provider, MD   letrozole [facility]) 2 5 mg tablet Take 2 5 mg by mouth in the morning 11/29/21   Historical Provider, MD   levETIRAcetam (KEPPRA) 100 mg/mL oral solution 5mL by mouth every 12 hours  On dialysis days only, take an additional 2 5mL after dialysis  2/27/23   Espiridion Call, DO   Linzess 72 MCG CAPS Take 1 capsule by mouth daily 11/28/22   Historical Provider, MD   LORazepam (ATIVAN) 0 5 mg tablet Take 0 5 mg by mouth every 8 (eight) hours as needed 9/8/22   Historical Provider, MD   metoprolol succinate (TOPROL-XL) 25 mg 24 hr tablet Take 1 tablet (25 mg total) by mouth 2 (two) times a day 2/27/23   Espiridion Call, DO   Multiple Vitamins-Minerals (ProRenal + D) TABS Take 1 tablet by mouth every evening   Indications: RENAL FAILURE, CHRONIC    Historical Provider, MD   nitroglycerin (NITROSTAT) 0 4 mg SL tablet Place 0 4 mg under the tongue 4/22/22   Historical Provider, MD   nystatin powder APPLY TO AFFECTED AREA(S) TWICE A DAY 9/9/22   Historical Provider, MD   ondansetron (ZOFRAN) 4 mg tablet Take 1 tablet (4 mg total) by mouth every 8 (eight) hours as needed for nausea or vomiting 2/27/23   Shani Ibarra,    ondansetron (ZOFRAN-ODT) 4 mg disintegrating tablet TAKE 1 TABLET BY MOUTH EVERY 8 HOURS AS NEEDED FOR NAUSEA OR VOMITING 11/26/22   Historical Provider, MD   pantoprazole (PROTONIX) 20 mg tablet Take 1 tablet (20 mg total) by mouth daily 2/27/23   Adarsh Pearson MD   Polyethylene Glycol 3350 (MIRALAX PO) Take by mouth    Historical Provider, MD   senna-docusate sodium (SENOKOT S) 8 6-50 mg per tablet Take 1 tablet by mouth daily 2/27/23 3/29/23  Regina Kidd,    simethicone (MYLICON) 80 mg chewable tablet Chew 1 tab po once daily 9/1/22   Regina Kidd DO   warfarin (COUMADIN) 5 mg tablet Take 1 tablet (5 mg total) by mouth daily 2/27/23   Regina Kidd DO     I have reviewed home medications using recent Epic encounter  Allergies: Allergies   Allergen Reactions   • Morphine Anaphylaxis     Hallucinations and hives per daughter  anaphylaxis     • Ace Inhibitors Other (See Comments)     dialysis     • Codeine GI Intolerance     "heart racing"  hear racing     • Iodine - Food Allergy Other (See Comments)     Must avoid due to kidneys and thyroid     • Nsaids Other (See Comments)   • Other Other (See Comments)     dialysis   • Tramadol Hives     Sleepiness per daughter who reports "sensitivity"         Social History:  Marital Status: Single   Occupation: Retired  Patient Pre-hospital Living Situation: Home, With other family member: daughter  Patient Pre-hospital Level of Mobility: walks with walker, short distances, needs wheelchair for longer  Patient Pre-hospital Diet Restrictions: Diabetic, renal  Substance Use History:   Social History     Substance and Sexual Activity   Alcohol Use Not Currently     Social History     Tobacco Use   Smoking Status Never   Smokeless Tobacco Never     Social History     Substance and Sexual Activity   Drug Use Never       Family History:  Family History   Problem Relation Age of Onset   • Pancreatic cancer Mother    • Colon cancer Father        Physical Exam:     Vitals:   Blood Pressure: (!) 94/45 (03/09/23 0630)  Pulse: 62 (03/09/23 0630)  Temperature: 99 2 °F (37 3 °C) (03/08/23 2327)  Temp Source: Oral (03/08/23 2327)  Respirations: 18 (03/09/23 0630)  Weight - Scale: 61 kg (134 lb 7 7 oz) (03/08/23 2327)  SpO2: 100 % (03/09/23 0630)    Physical Exam  Vitals and nursing note reviewed  Constitutional:       General: She is not in acute distress       Appearance: She is well-developed  HENT:      Head: Normocephalic and atraumatic  Nose: Nose normal  No congestion  Mouth/Throat:      Mouth: Mucous membranes are dry  Pharynx: Oropharynx is clear  Eyes:      Conjunctiva/sclera: Conjunctivae normal    Cardiovascular:      Rate and Rhythm: Normal rate and regular rhythm  Heart sounds: Normal heart sounds  No murmur heard  No friction rub  No gallop  Pulmonary:      Effort: Pulmonary effort is normal  No respiratory distress  Breath sounds: Normal breath sounds  No wheezing, rhonchi or rales  Abdominal:      General: There is no distension  Palpations: Abdomen is soft  Tenderness: There is abdominal tenderness (point tenderness medial RLQ region)  There is no right CVA tenderness, left CVA tenderness or rebound  Comments: hypomotile   Musculoskeletal:      Cervical back: Neck supple  Right lower leg: No edema  Left lower leg: No edema  Skin:     General: Skin is warm and dry  Capillary Refill: Capillary refill takes less than 2 seconds  Neurological:      General: No focal deficit present  Mental Status: She is alert and oriented to person, place, and time  Mental status is at baseline     Psychiatric:         Mood and Affect: Mood normal          Behavior: Behavior normal           Additional Data:     Lab Results:  Results from last 7 days   Lab Units 03/09/23  0209   WBC Thousand/uL 10 53*   HEMOGLOBIN g/dL 10 8*   HEMATOCRIT % 34 1*   PLATELETS Thousands/uL 212   NEUTROS PCT % 81*   LYMPHS PCT % 12*   MONOS PCT % 6   EOS PCT % 1     Results from last 7 days   Lab Units 03/09/23  0209   SODIUM mmol/L 139   POTASSIUM mmol/L 4 6   CHLORIDE mmol/L 99   CO2 mmol/L 31   BUN mg/dL 12   CREATININE mg/dL 2 67*   ANION GAP mmol/L 9   CALCIUM mg/dL 9 6   ALBUMIN g/dL 3 8   TOTAL BILIRUBIN mg/dL 0 49   ALK PHOS U/L 138*   ALT U/L 8   AST U/L 11*   GLUCOSE RANDOM mg/dL 145*     Results from last 7 days   Lab Units 03/09/23  0530   INR  3 13*             Results from last 7 days   Lab Units 03/09/23  0209   LACTIC ACID mmol/L 1 4       Lines/Drains:  Invasive Devices     Peripheral Intravenous Line  Duration           Peripheral IV 03/09/23 Proximal;Right;Ventral (anterior) Forearm <1 day          Hemodialysis Catheter  Duration           HD Permanent Double Catheter -- days                    Imaging: Reviewed radiology reports from this admission including: abdominal/pelvic CT  CTA abdomen pelvis w wo contrast   Final Result by Carie Chen MD (03/09 0407)      3 4 cm rim-enhancing gas and fluid collection at the right lower quadrant adjacent to the appendiceal tip presumed to represent a periappendiceal abscess  Extensive mesenteric vessel disease as detailed above which is not significantly changed from prior exams  Findings discussed with Dr Selwyn Rendon at 12 AM, 3/9/2023         Workstation performed: [id number]             EKG and Other Studies Reviewed on Admission:   · EKG: No EKG obtained  ** Please Note: This note has been constructed using a voice recognition system   **

## 2023-03-09 NOTE — ED NOTES
Provider Walter Naik MD) made aware of patients low blood pressure (71/36)  Pt currently asymptomatic, provider advised to monitor blood pressure  Nurse will continue to monitor       Jeannette Presley  80/53/62 [id number]

## 2023-03-09 NOTE — ASSESSMENT & PLAN NOTE
· Continue Keppra 500mg b i d with additional 250mg after each dialysis session  · Patient receives dialysis Monday, Wednesday, Fridays

## 2023-03-09 NOTE — ED PROVIDER NOTES
History  Chief Complaint   Patient presents with   • Abdominal Pain     Per pt's daughter, abd pain "for awhile" "she has an appointment but she's in too much pain" States pain after eating and drinking  Also c/o vomiting  Per daughter, pt refusing to take medications     82y F brought in by daughter for evaluation of abd pain  Hx of history of breast cancer with mastectomy, skin cancer status postresection, ovarian cancer status post total abdominal hysterectomy, end-stage renal disease on dialysis, known LUE DVT, known mesenteric vascular disease, GERD  Per daughter, intermitted abd pain for which she follows w/ GI and takes protonix, simethicon, prn zofran for  Current pain is reportedly much different that her previous issues  For the past 3 wks or so, pt has been having worsening post prandial abd pain, assoc w/ n and nbnb emesis, now even having symptoms after taking her medications  C/o upper abd / periumbilical pain, 27/64 at max, unable to qualify  Pain will start usually w/in 5-10 minutes of eating/drinking and will be severe and then gradually resolve over a few hours  Pain has been lasting longer w/ less time w/o the pain between any attempts to eat/drink  Pt now refusing to even take meds due to the symptoms  Currently c/o 8/10 upper abd pain  Tried some broth and a small amt of pudding tonight and triggered severe pain  Denies melena/hematochezia  Abdominal Pain      Prior to Admission Medications   Prescriptions Last Dose Informant Patient Reported? Taking? Diclofenac Sodium (VOLTAREN) 1 %   Yes No   Sig: Apply 2 g topically daily   Indications: Joint Damage causing Pain and Loss of Function   HYDROmorphone (DILAUDID) 2 mg tablet   Yes No   Sig: Take 2 mg by mouth every 4 (four) hours as needed for moderate pain   LORazepam (ATIVAN) 0 5 mg tablet   Yes No   Sig: Take 0 5 mg by mouth every 8 (eight) hours as needed   Linzess 72 MCG CAPS   Yes No   Sig: Take 1 capsule by mouth daily   Multiple Vitamins-Minerals (ProRenal + D) TABS   Yes No   Sig: Take 1 tablet by mouth every evening  Indications: RENAL FAILURE, CHRONIC   Polyethylene Glycol 3350 (MIRALAX PO)   Yes No   Sig: Take by mouth   acetaminophen (TYLENOL) 500 mg tablet   Yes No   Sig: Take 500 mg by mouth every 6 (six) hours as needed   amLODIPine (NORVASC) 2 5 mg tablet   Yes No   Sig: TAKE 1 TABLET BY MOUTH TWICE A DAY  HOLD MORNING DOSE ON DIALYSIS DAYS   amLODIPine-benazepril (LOTREL 2 5-10) 2 5-10 MG per capsule   Yes No   Sig: Take 1 capsule by mouth daily   Patient not taking: Reported on 1/10/2023   atorvastatin (LIPITOR) 40 mg tablet   No No   Sig: Take 1 tablet (40 mg total) by mouth every evening   epoetin patricia (Procrit) 2,000 units/mL   Yes No   famotidine (PEPCID) 20 mg tablet   No No   Sig: Take 1 tablet (20 mg total) by mouth 2 (two) times a day as needed (abdominal pain)   hydrOXYzine HCL (ATARAX) 25 mg tablet   Yes No   Sig: Take 25 mg by mouth Three times daily as needed   letrozole (FEMARA) 2 5 mg tablet   Yes No   Sig: Take 2 5 mg by mouth in the morning   levETIRAcetam (KEPPRA) 100 mg/mL oral solution   No No   SimL by mouth every 12 hours  On dialysis days only, take an additional 2 5mL after dialysis     metoprolol succinate (TOPROL-XL) 25 mg 24 hr tablet   No No   Sig: Take 1 tablet (25 mg total) by mouth 2 (two) times a day   nitroglycerin (NITROSTAT) 0 4 mg SL tablet   Yes No   Sig: Place 0 4 mg under the tongue   nystatin powder   Yes No   Sig: APPLY TO AFFECTED AREA(S) TWICE A DAY   ondansetron (ZOFRAN) 4 mg tablet   No No   Sig: Take 1 tablet (4 mg total) by mouth every 8 (eight) hours as needed for nausea or vomiting   ondansetron (ZOFRAN-ODT) 4 mg disintegrating tablet   Yes No   Sig: TAKE 1 TABLET BY MOUTH EVERY 8 HOURS AS NEEDED FOR NAUSEA OR VOMITING   pantoprazole (PROTONIX) 20 mg tablet   No No   Sig: Take 1 tablet (20 mg total) by mouth daily   senna-docusate sodium (SENOKOT S) 8 6-50 mg per tablet   No No   Sig: Take 1 tablet by mouth daily   simethicone (MYLICON) 80 mg chewable tablet   No No   Sig: Chew 1 tab po once daily   warfarin (COUMADIN) 5 mg tablet   No No   Sig: Take 1 tablet (5 mg total) by mouth daily      Facility-Administered Medications: None       Past Medical History:   Diagnosis Date   • Breast cancer ([facility] )    • Hypertension    • Ovarian cancer ([facility] )    • Renal disorder    • Skin cancer        Past Surgical History:   Procedure Laterality Date   • BREAST SURGERY     •  SECTION     • EXPLORATORY LAPAROTOMY     • GALLBLADDER SURGERY     • HYSTERECTOMY     • IR THORACENTESIS  2022   • IR THORACENTESIS  2023       Family History   Problem Relation Age of Onset   • Pancreatic cancer Mother    • Colon cancer Father      I have reviewed and agree with the history as documented  E-Cigarette/Vaping   • E-Cigarette Use Never User      E-Cigarette/Vaping Substances   • Nicotine No    • THC No    • CBD No    • Flavoring No    • Other No    • Unknown No      Social History     Tobacco Use   • Smoking status: Never   • Smokeless tobacco: Never   Vaping Use   • Vaping Use: Never used   Substance Use Topics   • Alcohol use: Not Currently   • Drug use: Never       Review of Systems   Gastrointestinal: Positive for abdominal pain         Physical Exam  Physical Exam    Vital Signs  ED Triage Vitals   Temperature Pulse Respirations Blood Pressure SpO2   23   99 2 °F (37 3 °C) 82 18 101/67 97 %      Temp Source Heart Rate Source Patient Position - Orthostatic VS BP Location FiO2 (%)   23 --   Oral Monitor Sitting Right leg       Pain Score       23 0201       8           Vitals:    23 0830 23 0945 23 1114 23 1316   BP: (!) 95/46 99/56 (!) 84/67 (!) 119/44   Pulse: 63 67 65 63   Patient Position - Orthostatic VS:             Visual Acuity      ED Medications  Medications   piperacillin-tazobactam (ZOSYN) 2 25 g in sodium chloride 0 9 % 50 mL IVPB (0 g Intravenous Stopped 3/9/23 0608)   atorvastatin (LIPITOR) tablet 40 mg (has no administration in time range)   famotidine (PEPCID) tablet 20 mg (has no administration in time range)   HYDROmorphone (DILAUDID) tablet 2 mg (has no administration in time range)   letrozole [facility]) tablet 2 5 mg (2 5 mg Oral Not Given 3/9/23 1212)   levETIRAcetam (KEPPRA) oral solution 500 mg (500 mg Oral Given 3/9/23 1251)   levETIRAcetam (KEPPRA) oral solution 250 mg (has no administration in time range)   LORazepam (ATIVAN) tablet 0 5 mg (has no administration in time range)   metoprolol succinate (TOPROL-XL) 24 hr tablet 25 mg (25 mg Oral Not Given 3/9/23 1213)   pantoprazole (PROTONIX) EC tablet 20 mg (20 mg Oral Not Given 3/9/23 1213)   senna-docusate sodium (SENOKOT S) 8 6-50 mg per tablet 1 tablet (1 tablet Oral Not Given 3/9/23 1214)   simethicone (MYLICON) chewable tablet 80 mg (80 mg Oral Not Given 3/9/23 1214)   lactated ringers infusion (75 mL/hr Intravenous New Bag 3/9/23 1410)   acetaminophen (TYLENOL) tablet 650 mg (has no administration in time range)   ondansetron (ZOFRAN) injection 4 mg (has no administration in time range)   aluminum-magnesium hydroxide-simethicone (MYLANTA) oral suspension 30 mL (has no administration in time range)   insulin lispro (HumaLOG) 100 units/mL subcutaneous injection 1-5 Units (1 Units Subcutaneous Not Given 3/9/23 1306)   acetaminophen (TYLENOL) tablet 650 mg (has no administration in time range)   midodrine (PROAMATINE) tablet 5 mg (5 mg Oral Given 3/9/23 1251)   lubiprostone (AMITIZA) capsule 24 mcg (has no administration in time range)   ondansetron (ZOFRAN) injection 4 mg (4 mg Intravenous Given 3/9/23 0155)   fentanyl citrate (PF) 100 MCG/2ML 25 mcg (25 mcg Intravenous Given 3/9/23 0201)   iodixanol (VISIPAQUE) 320 MG/ML injection 100 mL (100 mL Intravenous Given 3/9/23 0305)   lactated ringers bolus 500 mL (0 mL Intravenous Stopped 3/9/23 0753)   sodium chloride 0 9 % bolus 500 mL (0 mL Intravenous Stopped 3/9/23 1048)   phytonadione (AQUA-MEPHYTON) 10 mg/mL 10 mg in sodium chloride 0 9 % 50 mL IVPB (10 mg Intravenous New Bag 3/9/23 1305)   prothrombin complex concentrate (human) (Kcentra) 1,500 Units (1,500 Units Intravenous Given 3/9/23 1155)       Diagnostic Studies  Results Reviewed     Procedure Component Value Units Date/Time    Protime-INR [[id number]]     Lab Status: No result Specimen: Blood     FLU/RSV/COVID - if FLU/RSV clinically relevant [[id number]]  (Normal) Collected: 03/09/23 0809    Lab Status: Final result Specimen: Nares from Nasopharyngeal Swab Updated: 03/09/23 0857     SARS-CoV-2 Negative     INFLUENZA A PCR Negative     INFLUENZA B PCR Negative     RSV PCR Negative    Narrative:      FOR PEDIATRIC PATIENTS - copy/paste COVID Guidelines URL to browser: [email or web address]  [email or web address]    SARS-CoV-2 assay is a Nucleic Acid Amplification assay intended for the  qualitative detection of nucleic acid from SARS-CoV-2 in nasopharyngeal  swabs  Results are for the presumptive identification of SARS-CoV-2 RNA  Positive results are indicative of infection with SARS-CoV-2, the virus  causing COVID-19, but do not rule out bacterial infection or co-infection  with other viruses  Laboratories within the [place] and its  territories are required to report all positive results to the appropriate  public health authorities  Negative results do not preclude SARS-CoV-2  infection and should not be used as the sole basis for treatment or other  patient management decisions  Negative results must be combined with  clinical observations, patient history, and epidemiological information  This test has not been FDA cleared or approved  This test has been authorized by FDA under an Emergency Use Authorization  (EUA)  This test is only authorized for the duration of time the  declaration that circumstances exist justifying the authorization of the  emergency use of an in vitro diagnostic tests for detection of SARS-CoV-2  virus and/or diagnosis of COVID-19 infection under section 564(b)(1) of  the Act, 21 U  S C  869OAN-1(U)(4), unless the authorization is terminated  or revoked sooner  The test has been validated but independent review by FDA  and CLIA is pending  Test performed using [employer] GeneXpert: This RT-PCR assay targets N2,  a region unique to SARS-CoV-2  A conserved region in the E-gene was chosen  for pan-Sarbecovirus detection which includes SARS-CoV-2  According to CMS-2020-01-R, this platform meets the definition of high-throughput technology      Protime-INR [[id number]]  (Abnormal) Collected: 03/09/23 0530    Lab Status: Final result Specimen: Blood from Arm, Right Updated: 03/09/23 0601     Protime 32 0 seconds      INR 3 13    Comprehensive metabolic panel [[id number]]  (Abnormal) Collected: 03/09/23 0209    Lab Status: Final result Specimen: Blood from Arm, Right Updated: 03/09/23 0231     Sodium 139 mmol/L      Potassium 4 6 mmol/L      Chloride 99 mmol/L      CO2 31 mmol/L      ANION GAP 9 mmol/L      BUN 12 mg/dL      Creatinine 2 67 mg/dL      Glucose 145 mg/dL      Calcium 9 6 mg/dL      AST 11 U/L      ALT 8 U/L      Alkaline Phosphatase 138 U/L      Total Protein 6 8 g/dL      Albumin 3 8 g/dL      Total Bilirubin 0 49 mg/dL      eGFR 16 ml/min/1 73sq m     Narrative:      [place] guidelines for Chronic Kidney Disease (CKD):   •  Stage 1 with normal or high GFR (GFR > 90 mL/min/1 73 square meters)  •  Stage 2 Mild CKD (GFR = 60-89 mL/min/1 73 square meters)  •  Stage 3A Moderate CKD (GFR = 45-59 mL/min/1 73 square meters)  •  Stage 3B Moderate CKD (GFR = 30-44 mL/min/1 73 square meters)  •  Stage 4 Severe CKD (GFR = 15-29 mL/min/1 73 square meters)  •  Stage 5 End Stage CKD (GFR <15 mL/min/1 73 square meters)  Note: GFR calculation is accurate only with a steady state creatinine    Lipase [[id number]]  (Normal) Collected: 03/09/23 0209    Lab Status: Final result Specimen: Blood from Arm, Right Updated: 03/09/23 0231     Lipase 14 u/L     Lactic acid [[id number]]  (Normal) Collected: 03/09/23 0209    Lab Status: Final result Specimen: Blood from Arm, Right Updated: 03/09/23 0230     LACTIC ACID 1 4 mmol/L     Narrative:      Result may be elevated if tourniquet was used during collection  CBC and differential [[id number]]  (Abnormal) Collected: 03/09/23 0209    Lab Status: Final result Specimen: Blood from Arm, Right Updated: 03/09/23 0216     WBC 10 53 Thousand/uL      RBC 3 48 Million/uL      Hemoglobin 10 8 g/dL      Hematocrit 34 1 %      MCV 98 fL      MCH 31 0 pg      MCHC 31 7 g/dL      RDW 13 4 %      MPV 10 7 fL      Platelets 672 Thousands/uL      nRBC 0 /100 WBCs      Neutrophils Relative 81 %      Immat GRANS % 0 %      Lymphocytes Relative 12 %      Monocytes Relative 6 %      Eosinophils Relative 1 %      Basophils Relative 0 %      Neutrophils Absolute 8 43 Thousands/µL      Immature Grans Absolute 0 04 Thousand/uL      Lymphocytes Absolute 1 27 Thousands/µL      Monocytes Absolute 0 67 Thousand/µL      Eosinophils Absolute 0 09 Thousand/µL      Basophils Absolute 0 03 Thousands/µL                  CTA abdomen pelvis w wo contrast   Final Result by Rona Montesinos MD (03/09 0407)      3 4 cm rim-enhancing gas and fluid collection at the right lower quadrant adjacent to the appendiceal tip presumed to represent a periappendiceal abscess  Extensive mesenteric vessel disease as detailed above which is not significantly changed from prior exams        Findings discussed with Dr Vipin Multani at 12 AM, 3/9/2023         Workstation performed: [id number]         IR drainage tube placement    (Results Pending)              Procedures  CriticalCare Time    Date/Time: 3/9/2023 2:24 PM  Performed by: Chet Kamara DO  Authorized by: Chet Kamara DO     Critical care provider statement:     Critical care time (minutes):  35    Critical care time was exclusive of:  Separately billable procedures and treating other patients and teaching time    Critical care was necessary to treat or prevent imminent or life-threatening deterioration of the following conditions:  Sepsis (acute intra-abd surgical emergency)    Critical care was time spent personally by me on the following activities:  Blood draw for specimens, obtaining history from patient or surrogate, development of treatment plan with patient or surrogate, evaluation of patient's response to treatment, examination of patient, ordering and performing treatments and interventions, ordering and review of laboratory studies, ordering and review of radiographic studies, re-evaluation of patient's condition and review of old charts    I assumed direction of critical care for this patient from another provider in my specialty: no               ED Course  ED Course as of 03/09/23 1425   Thu Mar 09, 2023   0214 Care transferred to Dr Trevor Ramirez  Labs just sent - will need f/u on labs and CTA  Given pt not eating or able to tolerate any medications at home, will need admission regardless of the above results  SBIRT 22yo+    Flowsheet Row Most Recent Value   SBIRT (25 yo +)    In order to provide better care to our patients, we are screening all of our patients for alcohol and drug use  Would it be okay to ask you these screening questions? Yes Filed at: 03/09/2023 9202   Initial Alcohol Screen: US AUDIT-C     1  How often do you have a drink containing alcohol? 0 Filed at: 03/09/2023 0213   2  How many drinks containing alcohol do you have on a typical day you are drinking? 0 Filed at: 03/09/2023 0213   3a  Male UNDER 65: How often do you have five or more drinks on one occasion?  0 Filed at: 03/09/2023 0804 3b  FEMALE Any Age, or MALE 65+: How often do you have 4 or more drinks on one occassion? 0 Filed at: 03/09/2023 9960   Audit-C Score 0 Filed at: 03/09/2023 6044   CIERA: How many times in the past year have you    Used an illegal drug or used a prescription medication for non-medical reasons? Never Filed at: 03/09/2023 0213                    Medical Decision Making  Worsening post prandial abd pain, n/v for the last 3 wks w/ hx of known mesenteric vascular disease, hx of ovarian cancer, GERD, prior rey  ddx includes worsening of mesenteric vascular dz, mesenteric ischemia, pancolitis, pancreatitis, appendicitis  Will get labs, cta a/p, give fluids, pain meds  Given pt unable to tolerate po - including meds - will need admission    Abdominal pain: acute illness or injury that poses a threat to life or bodily functions  Abscess, periappendiceal: acute illness or injury that poses a threat to life or bodily functions  Amount and/or Complexity of Data Reviewed  Independent Historian:      Details: daughter provided history and translation  External Data Reviewed: notes  Details: prior GI notes, prior anticoagulation/vascular notes  Labs: ordered  Radiology: ordered  Discussion of management or test interpretation with external provider(s): D/w Dr Mitch Petty for care transition  Risk  Prescription drug management  Decision regarding hospitalization            Disposition  Final diagnoses:   Abscess, periappendiceal   Abdominal pain     Time reflects when diagnosis was documented in both MDM as applicable and the Disposition within this note     Time User Action Codes Description Comment    3/9/2023  5:05 AM Dorsie Hebron P Add [K35 33] Abscess, periappendiceal     3/9/2023  5:05 AM Dorsie Hebron P Add [R10 9] Abdominal pain     3/9/2023  6:20 AM Walter Maldonaod Add [N18 6,  Z99 2] ESRD on dialysis [facility])       ED Disposition     ED Disposition   Admit    Condition   Stable    Date/Time   Thu Mar 9, 2023 5:05 AM    Comment   --         Follow-up Information     Follow up With Specialties Details Why Contact Info    Minus MD [facility] General Surgery Follow up in 2 week(s) Colonoscopy due in 6 weeks from discharge [address] [facility] [address]  [address] [address]  [phone]            Current Discharge Medication List      CONTINUE these medications which have NOT CHANGED    Details   acetaminophen (TYLENOL) 500 mg tablet Take 500 mg by mouth every 6 (six) hours as needed      amLODIPine (NORVASC) 2 5 mg tablet TAKE 1 TABLET BY MOUTH TWICE A DAY  HOLD MORNING DOSE ON DIALYSIS DAYS      amLODIPine-benazepril (LOTREL 2 5-10) 2 5-10 MG per capsule Take 1 capsule by mouth daily      atorvastatin (LIPITOR) 40 mg tablet Take 1 tablet (40 mg total) by mouth every evening  Qty: 90 tablet, Refills: 1    Associated Diagnoses: Mixed hyperlipidemia      Diclofenac Sodium (VOLTAREN) 1 % Apply 2 g topically daily  Indications: Joint Damage causing Pain and Loss of Function      epoetin patricia (Procrit) 2,000 units/mL       famotidine (PEPCID) 20 mg tablet Take 1 tablet (20 mg total) by mouth 2 (two) times a day as needed (abdominal pain)  Qty: 60 tablet, Refills: 11    Associated Diagnoses: Epigastric pain      HYDROmorphone (DILAUDID) 2 mg tablet Take 2 mg by mouth every 4 (four) hours as needed for moderate pain      hydrOXYzine HCL (ATARAX) 25 mg tablet Take 25 mg by mouth Three times daily as needed      letrozole (FEMARA) 2 5 mg tablet Take 2 5 mg by mouth in the morning      levETIRAcetam (KEPPRA) 100 mg/mL oral solution 5mL by mouth every 12 hours  On dialysis days only, take an additional 2 5mL after dialysis    Qty: 473 mL, Refills: 3    Associated Diagnoses: Seizure (HCC)      Linzess 72 MCG CAPS Take 1 capsule by mouth daily      LORazepam (ATIVAN) 0 5 mg tablet Take 0 5 mg by mouth every 8 (eight) hours as needed      metoprolol succinate (TOPROL-XL) 25 mg 24 hr tablet Take 1 tablet (25 mg total) by mouth 2 (two) times a day  Qty: 180 tablet, Refills: 1    Associated Diagnoses: Acute deep vein thrombosis (DVT) of left upper extremity, unspecified vein (HCC)      Multiple Vitamins-Minerals (ProRenal + D) TABS Take 1 tablet by mouth every evening  Indications: RENAL FAILURE, CHRONIC      nitroglycerin (NITROSTAT) 0 4 mg SL tablet Place 0 4 mg under the tongue      nystatin powder APPLY TO AFFECTED AREA(S) TWICE A DAY      ondansetron (ZOFRAN) 4 mg tablet Take 1 tablet (4 mg total) by mouth every 8 (eight) hours as needed for nausea or vomiting  Qty: 20 tablet, Refills: 0    Associated Diagnoses: Epigastric pain      ondansetron (ZOFRAN-ODT) 4 mg disintegrating tablet TAKE 1 TABLET BY MOUTH EVERY 8 HOURS AS NEEDED FOR NAUSEA OR VOMITING      pantoprazole (PROTONIX) 20 mg tablet Take 1 tablet (20 mg total) by mouth daily  Qty: 60 tablet, Refills: 11    Associated Diagnoses: Epigastric pain      Polyethylene Glycol 3350 (MIRALAX PO) Take by mouth      senna-docusate sodium (SENOKOT S) 8 6-50 mg per tablet Take 1 tablet by mouth daily  Qty: 30 tablet, Refills: 1    Associated Diagnoses: Constipation, unspecified constipation type      simethicone (MYLICON) 80 mg chewable tablet Chew 1 tab po once daily  Qty: 90 tablet, Refills: 3    Associated Diagnoses: Gastroesophageal reflux disease, unspecified whether esophagitis present      warfarin (COUMADIN) 5 mg tablet Take 1 tablet (5 mg total) by mouth daily  Qty: 30 tablet, Refills: 0    Associated Diagnoses: Acute deep vein thrombosis (DVT) of left upper extremity, unspecified vein (HCC)             No discharge procedures on file      PDMP Review       Value Time User    PDMP Reviewed  Yes 3/9/2023  5:34 AM Raul Chen PA-C          ED Provider  Electronically Signed by           Negrito Mccracken DO  03/09/23 8188

## 2023-03-09 NOTE — CONSULTS
[address] [facility] 82 y o  female MRN: [id number]  Unit/Bed#: ED-15 Encounter: [id number]    ASSESSMENT and PLAN:  1  ESRD on HD:   · Receives in center hemodialysis at [facility]  · Treatment days: Monday, Wednesday, Friday  · Compliant with treatment  · Outpatient prescription: Sodium 138, bicarbonate 32, potassium 2K, blood flow rate 400, treatment time 3 hours 30 minutes  · Target weight 61 kg  · Has not been eating well or drinking well the last few days  No evidence of volume overload  Labs acceptable  · We will plan on hemodialysis treatment tomorrow, 9/10  2  Access:   · PermCath left IJ  · Nonfunctional AV fistula right arm  3  Hypertension:   · Blood pressure intermittently low  · From review of outpatient med rec at [address] [place] [address] on amlodipine and metoprolol which are currently on hold  4  Anemia:   · On Mircera 30 mcg every 4 weeks at [address] [place] [address]  · Receives Venofer 50 mg weekly at outpatient hemodialysis which we will plan on starting next week if she remains an inpatient and free of infection  5  Mineral and bone disease:   · Outpatient monitoring  · From review of outpatient records not on a binder  · Currently n p o   6  Reason for admission:   · Abdominal pain after eating and drinking the last several days  · Family member present who explained she has not been able to give her mother her evening medications for 2 days which include Keppra for seizures  · Imaging CT with and without contrast: Concern for appendiceal abscess    On Zosyn    Other problems: GERD, diabetes mellitus type 2, DVT, atrial fibrillation on Coumadin, breast cancer, ovarian cancer    SUMMARY OF RECOMMENDATIONS:  • Hemodialysis planned for tomorrow    HISTORY OF PRESENT ILLNESS:  Requesting Physician: Lian Campa MD  Reason for Consult: ESRD on HD    Salima Cervantes is a 82 y o  female with a past medical history of ESRD on hemodialysis, seizure disorder, breast cancer, ovarian cancer, diabetes mellitus type 2, hypertension, DVT, GERD, chronic atrial fibrillation on Coumadin who was admitted to [facility] after presenting with persistent abdominal pain following eating and drinking  Daughter at bedside who is interpreting and providing information since patient is primarily [place] speaking  According to her daughter she has been unable to provide her with her evening medications due to pain/symptoms  She has been going for her outpatient hemodialysis treatments  She denies shortness of breath  Laying flat on the stretcher comfortably  No edema  No fevers but complains of chills and being cold all the time  Left IJ PermCath  Dressing dry and intact  No erythema or tenderness  Right arm AV fistula is no longer functional   A renal consultation is requested today for assistance in the management of ESRD  Rene Vallejo is a known ESRD patient who undergoes maintenance hemodialysis at [facility] on Monday, Wednesday, Friday      PAST MEDICAL HISTORY:  Past Medical History:   Diagnosis Date   • Breast cancer ([facility] )    • Hypertension    • Ovarian cancer ([facility] )    • Renal disorder    • Skin cancer        PAST SURGICAL HISTORY:  Past Surgical History:   Procedure Laterality Date   • BREAST SURGERY     •  SECTION     • EXPLORATORY LAPAROTOMY     • GALLBLADDER SURGERY     • HYSTERECTOMY     • IR THORACENTESIS  2022   • IR THORACENTESIS  2023       ALLERGIES:  Allergies   Allergen Reactions   • Morphine Anaphylaxis     Hallucinations and hives per daughter  anaphylaxis     • Ace Inhibitors Other (See Comments)     dialysis     • Codeine GI Intolerance     "heart racing"  hear racing     • Iodine - Food Allergy Other (See Comments)     Must avoid due to kidneys and thyroid     • Nsaids Other (See Comments)   • Other Other (See Comments)     dialysis   • Tramadol Hives     Sleepiness per daughter who reports "sensitivity"         SOCIAL HISTORY:  Social History     Substance and Sexual Activity Alcohol Use Not Currently     Social History     Substance and Sexual Activity   Drug Use Never     Social History     Tobacco Use   Smoking Status Never   Smokeless Tobacco Never       FAMILY HISTORY:  Family History   Problem Relation Age of Onset   • Pancreatic cancer Mother    • Colon cancer Father        MEDICATIONS:    Current Facility-Administered Medications:   •  piperacillin-tazobactam (ZOSYN) 2 25 g in sodium chloride 0 9 % 50 mL IVPB, 2 25 g, Intravenous, Q8H, Patricia Jackson MD, Stopped at 03/09/23 0608  •  sodium chloride 0 9 % bolus 500 mL, 500 mL, Intravenous, Once, Jes Garcia MD, Last Rate: 250 mL/hr at 03/09/23 0754, 500 mL at 03/09/23 0754    Current Outpatient Medications:   •  acetaminophen (TYLENOL) 500 mg tablet, Take 500 mg by mouth every 6 (six) hours as needed, Disp: , Rfl:   •  amLODIPine (NORVASC) 2 5 mg tablet, TAKE 1 TABLET BY MOUTH TWICE A DAY  HOLD MORNING DOSE ON DIALYSIS DAYS, Disp: , Rfl:   •  amLODIPine-benazepril (LOTREL 2 5-10) 2 5-10 MG per capsule, Take 1 capsule by mouth daily (Patient not taking: Reported on 1/10/2023), Disp: , Rfl:   •  atorvastatin (LIPITOR) 40 mg tablet, Take 1 tablet (40 mg total) by mouth every evening, Disp: 90 tablet, Rfl: 1  •  Diclofenac Sodium (VOLTAREN) 1 %, Apply 2 g topically daily   Indications: Joint Damage causing Pain and Loss of Function, Disp: , Rfl:   •  epoetin patricia (Procrit) 2,000 units/mL, , Disp: , Rfl:   •  famotidine (PEPCID) 20 mg tablet, Take 1 tablet (20 mg total) by mouth 2 (two) times a day as needed (abdominal pain), Disp: 60 tablet, Rfl: 11  •  HYDROmorphone (DILAUDID) 2 mg tablet, Take 2 mg by mouth every 4 (four) hours as needed for moderate pain, Disp: , Rfl:   •  hydrOXYzine HCL (ATARAX) 25 mg tablet, Take 25 mg by mouth Three times daily as needed, Disp: , Rfl:   •  letrozole (FEMARA) 2 5 mg tablet, Take 2 5 mg by mouth in the morning, Disp: , Rfl:   •  levETIRAcetam (KEPPRA) 100 mg/mL oral solution, 5mL by mouth every 12 hours  On dialysis days only, take an additional 2 5mL after dialysis  , Disp: 473 mL, Rfl: 3  •  Linzess 72 MCG CAPS, Take 1 capsule by mouth daily, Disp: , Rfl:   •  LORazepam (ATIVAN) 0 5 mg tablet, Take 0 5 mg by mouth every 8 (eight) hours as needed, Disp: , Rfl:   •  metoprolol succinate (TOPROL-XL) 25 mg 24 hr tablet, Take 1 tablet (25 mg total) by mouth 2 (two) times a day, Disp: 180 tablet, Rfl: 1  •  Multiple Vitamins-Minerals (ProRenal + D) TABS, Take 1 tablet by mouth every evening  Indications: RENAL FAILURE, CHRONIC, Disp: , Rfl:   •  nitroglycerin (NITROSTAT) 0 4 mg SL tablet, Place 0 4 mg under the tongue, Disp: , Rfl:   •  nystatin powder, APPLY TO AFFECTED AREA(S) TWICE A DAY, Disp: , Rfl:   •  ondansetron (ZOFRAN) 4 mg tablet, Take 1 tablet (4 mg total) by mouth every 8 (eight) hours as needed for nausea or vomiting, Disp: 20 tablet, Rfl: 0  •  ondansetron (ZOFRAN-ODT) 4 mg disintegrating tablet, TAKE 1 TABLET BY MOUTH EVERY 8 HOURS AS NEEDED FOR NAUSEA OR VOMITING, Disp: , Rfl:   •  pantoprazole (PROTONIX) 20 mg tablet, Take 1 tablet (20 mg total) by mouth daily, Disp: 60 tablet, Rfl: 11  •  Polyethylene Glycol 3350 (MIRALAX PO), Take by mouth, Disp: , Rfl:   •  senna-docusate sodium (SENOKOT S) 8 6-50 mg per tablet, Take 1 tablet by mouth daily, Disp: 30 tablet, Rfl: 1  •  simethicone (MYLICON) 80 mg chewable tablet, Chew 1 tab po once daily, Disp: 90 tablet, Rfl: 3  •  warfarin (COUMADIN) 5 mg tablet, Take 1 tablet (5 mg total) by mouth daily, Disp: 30 tablet, Rfl: 0    REVIEW OF SYSTEMS:  Constitutional: Positive for some fatigue  Chills  Difficulty eating due to pain in her abdomen  HENT: Negative for postnasal drip  Eyes: Negative for visual disturbance  Respiratory: Negative for cough, shortness of breath and wheezing  Cardiovascular: Negative for chest pain, palpitations and leg swelling  Gastrointestinal: Positive for abdominal pain    Genitourinary: No dysuria, hematuria  Musculoskeletal: Negative for unusual arthralgias, back pain and joint swelling  Skin: Negative for rash  Neurological: Negative for focal weakness, headaches, dizziness  Hematological: Negative for bleeding  Psychiatric/Behavioral: Negative for confusion   All the systems were reviewed and were negative except as documented on the HPI  PHYSICAL EXAM:  Current Weight: Weight - Scale: 61 kg (134 lb 7 7 oz)  First Weight: Weight - Scale: 61 kg (134 lb 7 7 oz)  Vitals:    03/09/23 0407 03/09/23 0533 03/09/23 0630 03/09/23 0746   BP: (!) 116/48 (!) 97/46 (!) 94/45 (!) 105/43   BP Location: Left leg Left leg Left leg Left leg   Pulse: 68 64 62 61   Resp: 18 18 18 18   Temp:       TempSrc:       SpO2: 98% 99% 100% 99%   Weight:           Intake/Output Summary (Last 24 hours) at 3/9/2023 0849  Last data filed at 3/9/2023 0753  Gross per 24 hour   Intake 550 ml   Output --   Net 550 ml     Physical Exam  Vitals reviewed  Constitutional:       General: She is not in acute distress  Appearance: She is ill-appearing  She is not diaphoretic  HENT:      Head: Normocephalic and atraumatic  Nose: Nose normal  No congestion  Mouth/Throat:      Pharynx: No oropharyngeal exudate  Eyes:      General: No scleral icterus  Right eye: No discharge  Left eye: No discharge  Extraocular Movements: Extraocular movements intact  Conjunctiva/sclera: Conjunctivae normal    Neck:      Vascular: No carotid bruit  Cardiovascular:      Rate and Rhythm: Normal rate  Rhythm irregular  Heart sounds: No murmur heard  No friction rub  No gallop  Comments: Left IJ PermCath  Pulmonary:      Effort: Pulmonary effort is normal  No respiratory distress  Breath sounds: Normal breath sounds  No stridor  No wheezing, rhonchi or rales  Abdominal:      General: Bowel sounds are normal       Tenderness: There is abdominal tenderness     Musculoskeletal:         General: No swelling, tenderness, deformity or signs of injury  Cervical back: No rigidity or tenderness  Right lower leg: No edema  Left lower leg: No edema  Skin:     Capillary Refill: Capillary refill takes less than 2 seconds  Coloration: Skin is not jaundiced or pale  Findings: No erythema or rash  Neurological:      General: No focal deficit present  Mental Status: She is alert and oriented to person, place, and time  Psychiatric:         Mood and Affect: Mood normal          Thought Content:  Thought content normal          Judgment: Judgment normal          Invasive Devices:      Lab Results:   Results from last 7 days   Lab Units 03/09/23  0209   WBC Thousand/uL 10 53*   HEMOGLOBIN g/dL 10 8*   HEMATOCRIT % 34 1*   PLATELETS Thousands/uL 212   POTASSIUM mmol/L 4 6   CHLORIDE mmol/L 99   CO2 mmol/L 31   BUN mg/dL 12   CREATININE mg/dL 2 67*   CALCIUM mg/dL 9 6   ALK PHOS U/L 138*   ALT U/L 8   AST U/L 11*     Lab Results   Component Value Date    CALCIUM 9 6 03/09/2023    PHOS 1 8 (L) 06/07/2022     Other Studies:

## 2023-03-09 NOTE — ASSESSMENT & PLAN NOTE
Lab Results   Component Value Date    EGFR 16 03/09/2023    EGFR 10 01/19/2023    EGFR 13 11/19/2022    CREATININE 2 67 (H) 03/09/2023    CREATININE 3 71 (H) 01/19/2023    CREATININE 3 20 (H) 11/19/2022   · Patient currently on Monday, Wednesday, Friday HD schedule  · Daughter reports patient last received HD yesterday prior to coming to the ER  · Nephrology consulted

## 2023-03-09 NOTE — ASSESSMENT & PLAN NOTE
Lab Results   Component Value Date    HGBA1C 5 7 09/01/2022       No results for input(s): POCGLU in the last 72 hours      Blood Sugar Average: Last 72 hrs:  · SSI with accucheks

## 2023-03-09 NOTE — ASSESSMENT & PLAN NOTE
· Patient presented to the ED with her daughter following 3 weeks of post-prandial stomach pain and occasional vomiting  Daughter reports patient had appointment with GI soon, but for the last two days, the patient has not been able to tolerate any oral intake and so not able to keep down medications  · Patient reports after eating pain 10/10 epigastric pain that radiates into right lower quadrant area  · CT abdomen/pelvis demonstrating "3 4 cm rim-enhancing gas and fluid collection at the right lower quadrant adjacent to the appendiceal tip presumed to represent a periappendiceal abscess  Extensive mesenteric vessel disease as detailed above which is not significantly changed from prior exams  "  · Patient not fulfilling septic criteria with WBC 10 53 and afebrile  · Lipase 14  · Lactic acid 1 4  · Continue supportive care, pain management   · Will keep patient NPO for now   · Gentle fluid hydration  · Surgery consulted, recommendations appreciated  · No surgical intervention at this time  · Zosyn initiated in ED, continue   · IR consult for drainage of abscess

## 2023-03-09 NOTE — ASSESSMENT & PLAN NOTE
· Patient anticoagulated on 5mg coumadin daily  · POA INR 3 13, continue to monitor   · Continue metoprolol and amlodipine  · Hold morning dose of amlodipine on dialysis days

## 2023-03-09 NOTE — ASSESSMENT & PLAN NOTE
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

## 2023-03-09 NOTE — CONSULTS
Consultation Note - General Surgery  : [facility] Surgery Resident role on Janna Bonilla 82 y o  female MRN: [id number]  Unit/Bed#: ED-15 Encounter: [id number]        Assessment:  82y o  year old female with periappendiceal abscess    Plan:  - NPO  - Zosyn, renally dosed  - IR consult for drain placement  - Discuss reversal of warfarin for FFP to facilitate INR normalization and drain placement  - Consult nephrology  - Patient may require fluid resuscitation given poor PO tolerance for past two weeks  - PRN analgesia and antiemetics     HPI:  Theodore Rascon is a 82 y o  female with a history of HTN, DM2, ESRD on HD MWF, DVT on warfarin, breast cancer s/p mastectomy, ovarian cancer s/p LUZ MARINA, GERD, partial nephrectomy, chronic vascular disease  She presents with two weeks of abdominal pain and PO intolerance that has worsened over the past two days  Associated with nausea  CT scan obtained with 3 4 x 3 2 x 3 1 cm gas and fluid rim-enhancing collection at the right lower quadrant adjacent to the appendiceal tip  WBC 10 5, Hgb 10 8, INR 3 1  Afebrile, hypotensive  Abdomen is soft, focally tender in RLQ, non-distended  Physical Exam:  General: No acute distress, alert and oriented  CV:ell perfused, regular rate   Lungs: Normal work of breathing, no increased respiratory effort  Abdomen: Soft, focally tender in RLQ, non-distended  Extremities: No edema, clubbing or cyanosis  Skin: Warm, dry      Review of Systems   Constitutional: Positive for appetite change  Negative for chills and fever  HENT: Negative  Eyes: Negative  Respiratory: Negative  Cardiovascular: Negative  Gastrointestinal: Positive for abdominal pain and nausea  Endocrine: Negative  Musculoskeletal: Negative  Skin: Negative  Allergic/Immunologic: Negative  Neurological: Negative  Hematological: Negative  Psychiatric/Behavioral: Negative           Objective         Intake/Output Summary (Last 24 hours) at 3/9/2023 0717  Last data filed at 3/9/2023 6311  Gross per 24 hour   Intake 50 ml   Output --   Net 50 ml       First Vitals:   Blood Pressure: 101/67 (23)  Pulse: 82 (23)  Temperature: 99 2 °F (37 3 °C) (23)  Temp Source: Oral (23)  Respirations: 18 (23)  Weight - Scale: 61 kg (134 lb 7 7 oz) (23)  SpO2: 97 % (23)    Current Vitals:   Blood Pressure: (!) 94/45 (23)  Pulse: 62 (23)  Temperature: 99 2 °F (37 3 °C) (23)  Temp Source: Oral (23)  Respirations: 18 (23)  Weight - Scale: 61 kg (134 lb 7 7 oz) (23)  SpO2: 100 % (23)    Invasive Devices     Peripheral Intravenous Line  Duration           Peripheral IV 23 Proximal;Right;Ventral (anterior) Forearm <1 day          Hemodialysis Catheter  Duration           HD Permanent Double Catheter -- days                Imaging: I have personally reviewed pertinent reports  CTA abdomen pelvis w wo contrast    Result Date: 3/9/2023  Impression: 3 4 cm rim-enhancing gas and fluid collection at the right lower quadrant adjacent to the appendiceal tip presumed to represent a periappendiceal abscess  Extensive mesenteric vessel disease as detailed above which is not significantly changed from prior exams  Findings discussed with Dr Paris Ho at 12 AM, 3/9/2023 Workstation performed: [id number]       EKG, Pathology, and Other Studies: I have personally reviewed pertinent reports        Historical Information   Past Medical History:   Diagnosis Date   • Breast cancer ([address] )    • Hypertension    • Ovarian cancer ([address] )    • Renal disorder    • Skin cancer      Past Surgical History:   Procedure Laterality Date   • BREAST SURGERY     •  SECTION     • EXPLORATORY LAPAROTOMY     • GALLBLADDER SURGERY     • HYSTERECTOMY     • IR THORACENTESIS  2022   • IR THORACENTESIS  2023     Social History   Social History     Substance and Sexual Activity   Alcohol Use Not Currently     Social History     Substance and Sexual Activity   Drug Use Never     Social History     Tobacco Use   Smoking Status Never   Smokeless Tobacco Never     Family History   Problem Relation Age of Onset   • Pancreatic cancer Mother    • Colon cancer Father        Meds/Allergies   all current active meds have been reviewed, current meds:   Current Facility-Administered Medications   Medication Dose Route Frequency   • piperacillin-tazobactam (ZOSYN) 2 25 g in sodium chloride 0 9 % 50 mL IVPB  2 25 g Intravenous Q8H    and PTA meds:   Prior to Admission Medications   Prescriptions Last Dose Informant Patient Reported? Taking? Diclofenac Sodium (VOLTAREN) 1 %   Yes No   Sig: Apply 2 g topically daily  Indications: Joint Damage causing Pain and Loss of Function   HYDROmorphone (DILAUDID) 2 mg tablet   Yes No   Sig: Take 2 mg by mouth every 4 (four) hours as needed for moderate pain   LORazepam (ATIVAN) 0 5 mg tablet   Yes No   Sig: Take 0 5 mg by mouth every 8 (eight) hours as needed   Linzess 72 MCG CAPS   Yes No   Sig: Take 1 capsule by mouth daily   Multiple Vitamins-Minerals (ProRenal + D) TABS   Yes No   Sig: Take 1 tablet by mouth every evening  Indications: RENAL FAILURE, CHRONIC   Polyethylene Glycol 3350 (MIRALAX PO)   Yes No   Sig: Take by mouth   acetaminophen (TYLENOL) 500 mg tablet   Yes No   Sig: Take 500 mg by mouth every 6 (six) hours as needed   amLODIPine (NORVASC) 2 5 mg tablet   Yes No   Sig: TAKE 1 TABLET BY MOUTH TWICE A DAY   HOLD MORNING DOSE ON DIALYSIS DAYS   amLODIPine-benazepril (LOTREL 2 5-10) 2 5-10 MG per capsule   Yes No   Sig: Take 1 capsule by mouth daily   Patient not taking: Reported on 1/10/2023   atorvastatin (LIPITOR) 40 mg tablet   No No   Sig: Take 1 tablet (40 mg total) by mouth every evening   epoetin patricia (Procrit) 2,000 units/mL   Yes No   famotidine (PEPCID) 20 mg tablet   No No   Sig: Take 1 tablet (20 mg total) by mouth 2 (two) times a day as needed (abdominal pain)   hydrOXYzine HCL (ATARAX) 25 mg tablet   Yes No   Sig: Take 25 mg by mouth Three times daily as needed   letrozole (FEMARA) 2 5 mg tablet   Yes No   Sig: Take 2 5 mg by mouth in the morning   levETIRAcetam (KEPPRA) 100 mg/mL oral solution   No No   SimL by mouth every 12 hours  On dialysis days only, take an additional 2 5mL after dialysis  metoprolol succinate (TOPROL-XL) 25 mg 24 hr tablet   No No   Sig: Take 1 tablet (25 mg total) by mouth 2 (two) times a day   nitroglycerin (NITROSTAT) 0 4 mg SL tablet   Yes No   Sig: Place 0 4 mg under the tongue   nystatin powder   Yes No   Sig: APPLY TO AFFECTED AREA(S) TWICE A DAY   ondansetron (ZOFRAN) 4 mg tablet   No No   Sig: Take 1 tablet (4 mg total) by mouth every 8 (eight) hours as needed for nausea or vomiting   ondansetron (ZOFRAN-ODT) 4 mg disintegrating tablet   Yes No   Sig: TAKE 1 TABLET BY MOUTH EVERY 8 HOURS AS NEEDED FOR NAUSEA OR VOMITING   pantoprazole (PROTONIX) 20 mg tablet   No No   Sig: Take 1 tablet (20 mg total) by mouth daily   senna-docusate sodium (SENOKOT S) 8 6-50 mg per tablet   No No   Sig: Take 1 tablet by mouth daily   simethicone (MYLICON) 80 mg chewable tablet   No No   Sig: Chew 1 tab po once daily   warfarin (COUMADIN) 5 mg tablet   No No   Sig: Take 1 tablet (5 mg total) by mouth daily      Facility-Administered Medications: None     Allergies   Allergen Reactions   • Morphine Anaphylaxis     Hallucinations and hives per daughter  anaphylaxis     • Ace Inhibitors Other (See Comments)     dialysis     • Codeine GI Intolerance     "heart racing"  hear racing     • Iodine - Food Allergy Other (See Comments)     Must avoid due to kidneys and thyroid     • Nsaids Other (See Comments)   • Other Other (See Comments)     dialysis   • Tramadol Hives     Sleepiness per daughter who reports "sensitivity"         Lab Results: I have personally reviewed pertinent lab results  , CBC:   Lab Results   Component Value Date    WBC 10 53 (H) 03/09/2023    HGB 10 8 (L) 03/09/2023    HCT 34 1 (L) 03/09/2023    MCV 98 03/09/2023     03/09/2023    MCH 31 0 03/09/2023    MCHC 31 7 03/09/2023    RDW 13 4 03/09/2023    MPV 10 7 03/09/2023    NRBC 0 03/09/2023   , CMP:   Lab Results   Component Value Date    SODIUM 139 03/09/2023    K 4 6 03/09/2023    CL 99 03/09/2023    CO2 31 03/09/2023    BUN 12 03/09/2023    CREATININE 2 67 (H) 03/09/2023    CALCIUM 9 6 03/09/2023    AST 11 (L) 03/09/2023    ALT 8 03/09/2023    ALKPHOS 138 (H) 03/09/2023    EGFR 16 03/09/2023       Counseling / Coordination of Care  Total floor / unit time spent today 25 minutes  Greater than 50% of total time was spent with the patient and / or family counseling and / or coordination of care      Inpatient consult to Acute Care Surgery  Consult performed by: Freddy Kathleen MD  Consult ordered by: MD Freddy Pascal MD  3/9/2023 7:17 AM

## 2023-03-09 NOTE — TELEMEDICINE
e-Consult (IPC)  - Interventional Radiology  Trinidad Bonilla 82 y o  female MRN: [id number]  Unit/Bed#: [facility] 2 -01 Encounter: [id number]          Interventional Radiology has been consulted to evaluate Trinidad Bonilla    We were consulted by surgery concerning this patient with intra-abdominal abscess  Consults  03/09/23    Assessment/Recommendation:   82-year-old woman with history of mesenteric stenosis admitted with lower abdominal pain  Found to have periappendiceal abscess on CT  CT was reviewed  The abscess is in a suboptimal location for percutaneous access and at least on the CT from yesterday does not appear to be accessible  Will attempt drainage if there is percutaneous window at the time of drainage but may not be possible  Discussed with surgery  They prefer to reverse her INR to attempt this today  21-30 minutes, >50% of the total time devoted to medical consultative verbal/EMR discussion between providers  Written report will be generated in the EMR  Thank you for allowing Interventional Radiology to participate in the care of Trinidad Bonilla  Please don't hesitate to call or TigerText us with any questions       Marilyn Delgado MD

## 2023-03-09 NOTE — TELEMEDICINE
e-Consult (IPC)  - Interventional Radiology  Neil Schulz 82 y o  female MRN: [id number]  Unit/Bed#: ED-15 Encounter: [id number]          Interventional Radiology has been consulted to evaluate Neil Schulz    We were consulted by surgery concerning this patient with possible periappendiceal collection  Inpatient Consult to IR  Consult performed by: Brian Caputo PA-C  Consult ordered by: Gita Egan MD        03/09/23    Assessment/Recommendation:     82year old female with pmh of afib, DVT, breast ca, ovarian ca, ESRD on dialysis, CHF, on coumadin, presenting with abdominal pain  CTA abdomen/pelvis revealed probable 3 4cm periappendiceal collection  INR 3 13    - will attempt drain placement today with CT guidance  Collection is central in the abdomen and may not be accessible  - please keep npo  - will need INR reversal  Patient currently scheduled for 1430  Goal INR below 1 8  Discussed with surgery who will administer K centra prior     21-30 minutes, >50% of the total time devoted to medical consultative verbal/EMR discussion between providers  Written report will be generated in the EMR  Thank you for allowing Interventional Radiology to participate in the care of Neil Schulz  Please don't hesitate to call or TigerText us with any questions       Brian Caputo PA-C

## 2023-03-09 NOTE — ED NOTES
Per provider Patrina Buerger, DO), obtaining IV access on pt's right arm with a non-functional fistula present will be permitted        Tara Moreira  60/72/72 [id number]

## 2023-03-09 NOTE — ASSESSMENT & PLAN NOTE
· Patient currently on 2 mg hydromorphone prn  · PDMP reviewed  · Followed outpatient by pain management  · Continue pain management

## 2023-03-09 NOTE — ASSESSMENT & PLAN NOTE
· Patient with extensive history of ob/gyn related cancers  · 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/p radiation in 2018  · Ovarian cancer s/p total abdominal hysterectomy  · Followed outpatient by hem/onc, pain management

## 2023-03-09 NOTE — CONSULTS
Consultation - Infectious Disease   Prudencoliver Calsebastián 82 y o  female MRN: [id number]  Unit/Bed#: [place] [address] -01 Encounter: [id number]      IMPRESSION & RECOMMENDATIONS:     1  Appendiceal abscess:  - Patient with about 3 weeks of abdominal pains, found on CT 3/9 to have a 3 4 cm rim-enhancing gas and fluid collection in RLQ, adjacent to appendix  Abdominal exam benign, no clinical sings of peritonitis  She has been afebrile, with mild leukocytosis  IR is planning on possible drain placement, surgery also following     - would continue IV Pip-Tazo 2 25g q8h (dosed for HD)  to cover GI araceli and anaerobes  - will ask IR to send aerobic and anaerobic cultures with drain placement  - check blood cultures  - trend fever curve  - trend WBC  - if adequate drain source control achieved, anticipate total 10 days of antibiotic with eventual switch to PO based on cultures  - likely to need surgery follow up as outpatient for possible eventual appendectomy    2  ESRD on HD:   - Has left IJ PermCath and a non-functional right arm AV fistula  No tenderness at either site or erythema  - renally adjust antibiotics  - Pip-Tazo is 2 25 g every 8 hours    3  Afib/Hx of DVT on AC:  - Noted, on warfarin    4  Hx of Breast CA/Ovarian CA:  - had metastasis to chest wall, s/p radiation in 2018  Also had ovarian cancer with total abdominal hysterectomy  - currently on letrozole    5  T2DM    6  Seizure disorder:  - on home Keppra      I have discussed the above management plan in detail with the primary service    I have performed an extensive review of the medical records in [address] [place] [address] including review of the notes, radiographs, and laboratory results     HISTORY OF PRESENT ILLNESS:  Reason for Consult: appendiceal abscess    HPI: Prjohnncoliver Parra is a 82y o  year old female PMHx of breast and ovarian cancer, ESRD on HD, seizure disorder, Afib, Hx of DVT on [facility] who presented with several weeks of abdominal pains   Patient is [facility] [place]  History obtained with daughter at bedside  Patient has had lower abdominal pains now for 2-3 weeks but were acutely worsening over last several days  She does not recall fever but was reporting having some chills  She denies any prior history of appendicitis  She does follow outpatient with GI for chronic abdominal pains  She takes PPI and H2 blocker at home  CT scan showing collection of gas/fluid near tip of appendix concerning for appendiceal abscess  Patient is afebrile  Mild WBC elevation at 10 5  IR is consulted for possible drain placement  REVIEW OF SYSTEMS:  A complete review of systems is negative other than that noted in the HPI      PAST MEDICAL HISTORY:  Past Medical History:   Diagnosis Date   • Breast cancer ([facility] [address] )    • Hypertension    • Ovarian cancer ([facility] [facility] )    • Renal disorder    • Skin cancer      Past Surgical History:   Procedure Laterality Date   • BREAST SURGERY     •  SECTION     • EXPLORATORY LAPAROTOMY     • GALLBLADDER SURGERY     • HYSTERECTOMY     • IR THORACENTESIS  2022   • IR THORACENTESIS  2023       FAMILY HISTORY:  Non-contributory    SOCIAL HISTORY:  Social History   Social History     Substance and Sexual Activity   Alcohol Use Not Currently     Social History     Substance and Sexual Activity   Drug Use Never     Social History     Tobacco Use   Smoking Status Never   Smokeless Tobacco Never       ALLERGIES:  Allergies   Allergen Reactions   • Morphine Anaphylaxis     Hallucinations and hives per daughter  anaphylaxis     • Ace Inhibitors Other (See Comments)     dialysis     • Codeine GI Intolerance     "heart racing"  hear racing     • Iodine - Food Allergy Other (See Comments)     Must avoid due to kidneys and thyroid     • Nsaids Other (See Comments)   • Other Other (See Comments)     dialysis   • Tramadol Hives     Sleepiness per daughter who reports "sensitivity"         MEDICATIONS:  All current active medications have been reviewed  PHYSICAL EXAM:  Temp:  [99 2 °F (37 3 °C)] 99 2 °F (37 3 °C)  HR:  [61-82] 67  Resp:  [16-18] 16  BP: ()/(36-67) 99/56  SpO2:  [94 %-100 %] 94 %  Temp (24hrs), Av 2 °F (37 3 °C), Min:99 2 °F (37 3 °C), Max:99 2 °F (37 3 °C)  Current: Temperature: 99 2 °F (37 3 °C)    Intake/Output Summary (Last 24 hours) at 3/9/2023 1058  Last data filed at 3/9/2023 1048  Gross per 24 hour   Intake 1050 ml   Output --   Net 1050 ml       General Appearance:  Appearing well, nontoxic, and in no distress   Head:  Normocephalic, without obvious abnormality, atraumatic   Eyes:  Conjunctiva pink and sclera anicteric, both eyes   Nose: Nares normal   Throat: Oropharynx moist    Neck: Supple   Back:   Symmetric,   Lungs:   Clear to auscultation bilaterally, respirations unlabored   Chest Wall:  No tenderness or deformity   Heart:  RRR; no murmur, rub or gallop   Abdomen:   Soft, non-distended,mild tenderness in lower abdomen, no rebound or guarding    Extremities: No cyanosis, clubbing or edema   Skin: No rashes or lesions  No draining wounds noted  Lymph nodes: Cervical, supraclavicular nodes normal   Neurologic: Alert        LABS, IMAGING, & OTHER STUDIES:  Lab Results:  I have personally reviewed pertinent labs  Results from last 7 days   Lab Units 23  0209   WBC Thousand/uL 10 53*   HEMOGLOBIN g/dL 10 8*   PLATELETS Thousands/uL 212     Results from last 7 days   Lab Units 23  0209   SODIUM mmol/L 139   POTASSIUM mmol/L 4 6   CHLORIDE mmol/L 99   CO2 mmol/L 31   BUN mg/dL 12   CREATININE mg/dL 2 67*   EGFR ml/min/1 73sq m 16   CALCIUM mg/dL 9 6   AST U/L 11*   ALT U/L 8   ALK PHOS U/L 138*       Imaging Studies:   I have personally reviewed pertinent imaging study reports and images in PACS      CTA abdomen/pelvis 3/9/23:  3 4 cm rim-enhancing gas and fluid collection at the right lower quadrant adjacent to the appendiceal tip presumed to represent a periappendiceal abscess      Extensive mesenteric vessel disease as detailed above which is not significantly changed from prior exams  Other Studies:   I have personally reviewed pertinent reports        Oliver Fowler MD  [facility]

## 2023-03-10 ENCOUNTER — APPOINTMENT (INPATIENT)
Dept: DIALYSIS | Facility: HOSPITAL | Age: 83
End: 2023-03-10

## 2023-03-10 LAB
ANION GAP SERPL CALCULATED.3IONS-SCNC: 7 MMOL/L (ref 4–13)
BASOPHILS # BLD AUTO: 0.03 THOUSANDS/ÂΜL (ref 0–0.1)
BASOPHILS NFR BLD AUTO: 0 % (ref 0–1)
BUN SERPL-MCNC: 22 MG/DL (ref 5–25)
CALCIUM SERPL-MCNC: 8.6 MG/DL (ref 8.4–10.2)
CHLORIDE SERPL-SCNC: 101 MMOL/L (ref 96–108)
CO2 SERPL-SCNC: 30 MMOL/L (ref 21–32)
CREAT SERPL-MCNC: 4.24 MG/DL (ref 0.6–1.3)
EOSINOPHIL # BLD AUTO: 0.14 THOUSAND/ÂΜL (ref 0–0.61)
EOSINOPHIL NFR BLD AUTO: 2 % (ref 0–6)
ERYTHROCYTE [DISTWIDTH] IN BLOOD BY AUTOMATED COUNT: 13.4 % (ref 11.6–15.1)
GFR SERPL CREATININE-BSD FRML MDRD: 9 ML/MIN/1.73SQ M
GLUCOSE SERPL-MCNC: 105 MG/DL (ref 65–140)
GLUCOSE SERPL-MCNC: 146 MG/DL (ref 65–140)
GLUCOSE SERPL-MCNC: 87 MG/DL (ref 65–140)
GLUCOSE SERPL-MCNC: 90 MG/DL (ref 65–140)
GLUCOSE SERPL-MCNC: 94 MG/DL (ref 65–140)
HCT VFR BLD AUTO: 28.9 % (ref 34.8–46.1)
HGB BLD-MCNC: 9.1 G/DL (ref 11.5–15.4)
IMM GRANULOCYTES # BLD AUTO: 0.03 THOUSAND/UL (ref 0–0.2)
IMM GRANULOCYTES NFR BLD AUTO: 0 % (ref 0–2)
LYMPHOCYTES # BLD AUTO: 1.28 THOUSANDS/ÂΜL (ref 0.6–4.47)
LYMPHOCYTES NFR BLD AUTO: 17 % (ref 14–44)
MAGNESIUM SERPL-MCNC: 1.7 MG/DL (ref 1.9–2.7)
MCH RBC QN AUTO: 31.2 PG (ref 26.8–34.3)
MCHC RBC AUTO-ENTMCNC: 31.5 G/DL (ref 31.4–37.4)
MCV RBC AUTO: 99 FL (ref 82–98)
MONOCYTES # BLD AUTO: 0.7 THOUSAND/ÂΜL (ref 0.17–1.22)
MONOCYTES NFR BLD AUTO: 9 % (ref 4–12)
NEUTROPHILS # BLD AUTO: 5.28 THOUSANDS/ÂΜL (ref 1.85–7.62)
NEUTS SEG NFR BLD AUTO: 72 % (ref 43–75)
NRBC BLD AUTO-RTO: 0 /100 WBCS
PHOSPHATE SERPL-MCNC: 3.4 MG/DL (ref 2.3–4.1)
PLATELET # BLD AUTO: 190 THOUSANDS/UL (ref 149–390)
PMV BLD AUTO: 10.6 FL (ref 8.9–12.7)
POTASSIUM SERPL-SCNC: 4.1 MMOL/L (ref 3.5–5.3)
PROCALCITONIN SERPL-MCNC: 0.56 NG/ML
RBC # BLD AUTO: 2.92 MILLION/UL (ref 3.81–5.12)
SODIUM SERPL-SCNC: 138 MMOL/L (ref 135–147)
WBC # BLD AUTO: 7.46 THOUSAND/UL (ref 4.31–10.16)

## 2023-03-10 PROCEDURE — 5A1D70Z PERFORMANCE OF URINARY FILTRATION, INTERMITTENT, LESS THAN 6 HOURS PER DAY: ICD-10-PCS | Performed by: INTERNAL MEDICINE

## 2023-03-10 RX ORDER — WARFARIN SODIUM 5 MG/1
5 TABLET ORAL
Status: DISCONTINUED | OUTPATIENT
Start: 2023-03-10 | End: 2023-03-11

## 2023-03-10 RX ORDER — SACCHAROMYCES BOULARDII 250 MG
250 CAPSULE ORAL 2 TIMES DAILY
Status: DISCONTINUED | OUTPATIENT
Start: 2023-03-10 | End: 2023-03-14 | Stop reason: HOSPADM

## 2023-03-10 RX ADMIN — SIMETHICONE 80 MG: 80 TABLET, CHEWABLE ORAL at 08:06

## 2023-03-10 RX ADMIN — ATORVASTATIN CALCIUM 40 MG: 40 TABLET, FILM COATED ORAL at 17:48

## 2023-03-10 RX ADMIN — LETROZOLE 2.5 MG: 2.5 TABLET, FILM COATED ORAL at 08:06

## 2023-03-10 RX ADMIN — LEVETIRACETAM 500 MG: 100 SOLUTION ORAL at 08:06

## 2023-03-10 RX ADMIN — SENNOSIDES AND DOCUSATE SODIUM 1 TABLET: 8.6; 5 TABLET ORAL at 08:06

## 2023-03-10 RX ADMIN — METOPROLOL SUCCINATE 25 MG: 25 TABLET, EXTENDED RELEASE ORAL at 21:02

## 2023-03-10 RX ADMIN — WARFARIN SODIUM 5 MG: 5 TABLET ORAL at 17:48

## 2023-03-10 RX ADMIN — MIDODRINE HYDROCHLORIDE 5 MG: 5 TABLET ORAL at 06:01

## 2023-03-10 RX ADMIN — LEVETIRACETAM 500 MG: 100 SOLUTION ORAL at 21:01

## 2023-03-10 RX ADMIN — PIPERACILLIN AND TAZOBACTAM 2.25 G: 2; .25 INJECTION, POWDER, LYOPHILIZED, FOR SOLUTION INTRAVENOUS at 21:05

## 2023-03-10 RX ADMIN — MIDODRINE HYDROCHLORIDE 5 MG: 5 TABLET ORAL at 15:11

## 2023-03-10 RX ADMIN — PIPERACILLIN AND TAZOBACTAM 2.25 G: 2; .25 INJECTION, POWDER, LYOPHILIZED, FOR SOLUTION INTRAVENOUS at 15:11

## 2023-03-10 RX ADMIN — PIPERACILLIN AND TAZOBACTAM 2.25 G: 2; .25 INJECTION, POWDER, LYOPHILIZED, FOR SOLUTION INTRAVENOUS at 06:02

## 2023-03-10 RX ADMIN — PANTOPRAZOLE SODIUM 20 MG: 20 TABLET, DELAYED RELEASE ORAL at 05:59

## 2023-03-10 RX ADMIN — Medication 250 MG: at 21:01

## 2023-03-10 RX ADMIN — MIDODRINE HYDROCHLORIDE 5 MG: 5 TABLET ORAL at 10:43

## 2023-03-10 NOTE — ASSESSMENT & PLAN NOTE
· Patient presented to the ED with her daughter following 3 weeks of post-prandial stomach pain and occasional vomiting  Daughter reports patient had appointment with GI soon, but for the last two days, the patient has not been able to tolerate any oral intake and so not able to keep down medications  · Patient reports after eating pain 10/10 epigastric pain that radiates into right lower quadrant area  · CT abdomen/pelvis demonstrating "3 4 cm rim-enhancing gas and fluid collection at the right lower quadrant adjacent to the appendiceal tip presumed to represent a periappendiceal abscess  Extensive mesenteric vessel disease as detailed above which is not significantly changed from prior exams "    Patient seen by surgery  Gordo high risk for surgery, recommend IR drainage or medical treatment with IV abx  IR feels it is not amenable to drainage at this time  Patient is feeling better on greater than 24 hours of IV Zosyn    Appreciate ID help        If she symptomatically gets worse, would repeat CT scan

## 2023-03-10 NOTE — PHYSICAL THERAPY NOTE
PHYSICAL THERAPY EVALUATION          Patient Name: Magalis Arriaga  UTKMW'R Date: 3/10/2023  PT EVALUATION    82 y o     [id number]    Abscess, periappendiceal [K35 33]  Abdominal pain [R10 9]  ESRD on dialysis ([facility] [address] ) [N18 6, Z99 2]    Past Medical History:   Diagnosis Date    [facility] )     Hypertension     Ovar[facility] ([facility] )     Renal disorder     Skin cancer      Past Surgical History:   Procedure Laterality Date    BREAST SURGERY       SECTION      EXPLORATORY LAPAROTOMY      GALLBLADDER SURGERY      HYSTERECTOMY      IR DRAINAGE TUBE PLACEMENT  3/9/2023    IR THORACENTESIS  2022    IR THORACENTESIS  2023        03/10/23 0806   PT Last Visit   PT Visit Date 03/10/23   Note Type   Note type Evaluation   Pain Assessment   Pain Assessment Tool 0-10   Pain Score No Pain   Restrictions/Precautions   Other Precautions Chair Alarm; Bed Alarm;Multiple lines; Fall Risk   Home Living   Type of [address] [place] [address] Two level;Bed/bath upstairs; Ramped entrance   [place] Shower/Tub Walk-in shower   [address] [facility] (Comment); Stair glide; Hospital bed  (Rollator)   Additional Comments stair glide to second floor   Prior Function   Level of Avery Needs assistance with ADLs; Needs assistance with functional mobility; Needs assistance with IADLS   Lives With Daughter   Receives Help From Family  (dtrs, niece)   IADLs Family/Friend/Other provides transportation; Family/Friend/Other provides meals; Family/Friend/Other provides medication management   Falls in the last 6 months 0   Comments per dtr, pt gets A for ADLs (however when pain is controlled is encouraged to do more for herself- can self feed, wash her face, comb her hair, help w dressing)  dtr assists (supervises) mobility w Rollator, does have to lift her legs into bed   is never home alone, if dtr goes to work (part time), pt's other dtr or niece will come over   General Additional Pertinent History pt admitted 3/9/23 for periappendiceal abscess  per chart review not amendable to IR drainage  "ambulate" per general surgery notes  PMHx significant for CA, renal disorder managed by HD, Continuous opioid dependence, seizure, T2DM, afib  reports mobility/ LOF significantly depends on pain management  language barrier, dtr present to translate   Family/Caregiver Present Yes   Cognition   Overall Cognitive Status WFL   Arousal/Participation Cooperative   Orientation Level Oriented to person;Oriented to place; Disoriented to time  (knows day of week only)   Memory Within functional limits   Following Commands Follows one step commands without difficulty   RLE Assessment   RLE Assessment WFL   LLE Assessment   LLE Assessment WFL   Bed Mobility   Supine to Sit 5  Supervision   Additional items Bedrails; Increased time required   Sit to Supine 4  Minimal assistance   Additional items Assist x 1;LE management; Bedrails   Transfers   Sit to Stand 5  Supervision   Additional items Increased time required; Other  (RW)   Stand to Sit 5  Supervision   Additional items Increased time required; Other  (RW)   Ambulation/Elevation   Gait pattern Improper Weight shift; Forward Flexion; Short stride; Excessively slow   Gait Assistance 5  Supervision  (CGA progressing to close S)   Additional items Assist x 1   Assistive Device Rolling walker   Distance 25-30'   Balance   Static Standing Fair -   Dynamic Standing Poor +   Ambulatory Poor +   Endurance Deficit   Endurance Deficit No   Activity Tolerance   Activity Tolerance Patient tolerated treatment well   Medical Staff Made Aware OT   Nurse Made Aware Bharat MCCORD   Assessment   Prognosis Good   Problem List Decreased strength; Impaired balance;Decreased mobility;Pain   Assessment Tiffani Cameron is a 82 y o  female admitted to [address] on 3/9/2023 for Abscess, periappendiceal  Conservative management   PT was consulted and pt was seen on 3/10/2023 for mobility assessment and d/c planning  Pt presents w high fall risk, multiple lines  At baseline gets A for ADLs and mobility prn  Pt is currently functioning at a S-min A for bed mobility, S for transf and ambulation w RW  Pt demonstrated ability to ambulate limited household distances without difficulty  At risk for falls given gait deviations however never ambulates without S at home  Per dtr pt is functioning at baseline  Dtr reports mobility and indep w ADLs fluctuates on pain control; given pain currently controlled pt may benefit from continued skilled IP PT to address the above mentioned impairments of strength, balance in order to maximize recovery and increase functional independence when completing mobility and ADLs  At this time PT recommendations for d/c are home w continued family support  Barriers to Discharge None   Goals   Patient Goals pain control to be able to do more   STG Expiration Date 03/20/23   Short Term Goal #1 1)  Pt will perform bed mobility with Cherelle demonstrating appropriate technique 100% of the time in order to improve function  2)  Perform all transfers with Cherelle demonstrating safe and appropriate technique 100% of the time in order to improve ability to negotiate safely in home environment  3) Amb with least restrictive AD > 50'x1 with mod I in order to demonstrate ability to negotiate in home environment  4)  Improve overall strength and balance 1/2 grade in order to optimize ability to perform functional tasks and reduce fall risk  5) Increase activity tolerance to 45 minutes in order to improve endurance to functional tasks  6) PT for ongoing patient and family/caregiver education, DME needs and d/c planning in order to promote highest level of function in least restrictive environment  Plan   Treatment/Interventions Functional transfer training;LE strengthening/ROM; Therapeutic exercise;Patient/family training;Equipment eval/education; Bed mobility;Gait training;Spoke to nursing;OT;Family; Compensatory technique education   PT Frequency 1-2x/wk   Recommendation   PT Discharge Recommendation No rehabilitation needs  (dtr declining HHPT at this time, would like to focus on pain management)   AM-PAC Basic Mobility Inpatient   Turning in Flat Bed Without Bedrails 3   Lying on Back to Sitting on Edge of Flat Bed Without Bedrails 3   Moving Bed to Chair 3   Standing Up From Chair Using Arms 3   Walk in Room 3   Climb 3-5 Stairs With Railing 3   Basic Mobility Inpatient Raw Score 18   Basic Mobility Standardized Score 41 05   Highest Level Of Mobility   JH-HLM Goal 6: Walk 10 steps or more   JH-HLM Achieved 7: Walk 25 feet or more   End of Consult   Patient Position at End of Consult Supine;Bed/Chair alarm activated; All needs within reach   History: co - morbidities, fall risk, use of assistive device, assist for adl's/iadl's, multiple lines  Exam: impairments in systems including musculoskeletal (strength), neuromuscular (balance, gait, transfers, motor function), AM-PAC, cognition  Clinical: unstable/unpredictable; ongoing medical management for admitting dx  Complexity:high      Lexis Castellano, PT

## 2023-03-10 NOTE — CASE MANAGEMENT
Case Management Assessment & Discharge Planning Note    Patient name [facility] Location [address] Piper Neely* MRN [id number]  : 1940 Date 3/10/2023       Current Admission Date: 3/9/2023  Current Admission Diagnosis:Abscess, periappendiceal   Patient Active Problem List    Diagnosis Date Noted   • Abscess, periappendiceal 2023   • Dependence on respirator (ventilator) status ([facility] ) 2023   • Hyperparathyroidism, unspecified ([facility] ) 2023   • Hypertension    • 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 ([facility] ) 2022   • Drug-induced constipation 2022   • Hypomagnesemia 2022   • Acute deep vein thrombosis (DVT) of left upper extremity ([facility] ) 2022   • Continuous opioid dependence ([facility] ) 2022   • Iron deficiency anemia 2022   • Age-related osteoporosis without current pathological fracture 2022   • Carotid artery stenosis 2022   • Sacroiliitis ([facility] ) 06/10/2022   • Primary osteoarthritis of both hips 06/10/2022   • Chronic low back pain without sciatica 06/10/2022   • Status post lumbar spinal fusion 06/10/2022   • Seizure ([facility] ) 2022   • Encephalopathy acute 2022   • Acute on chronic respiratory failure ([facility] ) 2022   • Vaginal discharge 2022   • Moderate protein-calorie malnutrition ([facility] ) 2022   • Dysphagia 2022   • Left arm swelling 2022   • Breast cancer ([facility] [facility] ) 2022   • History of DVT (deep vein thrombosis) 2022   • Pleural effusion 2022   • Ambulatory dysfunction 2020   • Chronic atrial fibrillation ([facility] [facility] ) 2017   • Coronary artery disease involving native heart without angina pectoris 2017   • Gastroesophageal reflux disease 2017   • ESRD on dialysis ([facility] ) 2016   • Acute on chronic diastolic heart failure ([facility] ) 2015   • Type 2 diabetes mellitus with chronic kidney disease on chronic dialysis, with long-term current use of insulin ([facility] ) 2011   • Mixed hyperlipidemia 06/24/2011   • Gout 06/24/2011      LOS (days): 1  Geometric Mean LOS (GMLOS) (days): 5 20  Days to GMLOS:3 8     OBJECTIVE:    Risk of Unplanned Readmission Score: 47 35         Current admission status: Inpatient       Preferred Pharmacy:   [facility] [address], [place] - [address]  [address]  [address] [facility] [address]  Phone: [phone] Fax: [phone]    Primary Care Provider: Shalonda Olguin DO    Primary Insurance: MEDICARE  Secondary Insurance: [address]    ASSESSMENT:  [address], [address] Representative - Daughter   Primary Phone: [phone] (Mobile)               Advance Directives  Does patient have a [address]?: No  Was patient offered paperwork?: Yes  Does patient currently have a Health Care decision maker?: Yes, please see Health Care Proxy section (Pt's daughter reports that she and her siblings discuss all medical decisions when necessary)  Does patient have Advance Directives?: No  Was patient offered paperwork?: Yes  Primary Contact: Lilliana Do ([phone])    Readmission Root Cause  30 Day Readmission: No    Patient Information  Admitted from[de-identified] Home  Mental Status: Confused  During Assessment patient was accompanied by: Daughter  Assessment information provided by[de-identified] Daughter  Primary Caregiver: Family  Caregiver's Name[de-identified] Kunal Kandi Relationship to Patient[de-identified] Family Member  Caregiver's Telephone Number[de-identified] [phone]  Support Systems: Daughter  [place] of Residence: [address] [facility] do you live in?: [address] [place] entry access options   Select all that apply : Ramp  Type of Current Residence: 2 story home  Upon entering residence, is there a bedroom on the main floor (no further steps)?: No  A bedroom is located on the following floor levels of residence (select all that apply):: 2nd Floor  Upon entering residence, is there a bathroom on the main floor (no further steps)?: Yes  Number of steps to 2nd floor from main floor: One Flight (Has stair glide)  In the last 12 months, was there a time when you were not able to pay the mortgage or rent on time?: No  In the last 12 months, how many places have you lived?: 1  In the last 12 months, was there a time when you did not have a steady place to sleep or slept in a shelter (including now)?: No  Homeless/housing insecurity resource given?: N/A  Living Arrangements: Lives w/ Daughter, Lives w/ Extended Family    Activities of Daily Living Prior to Admission  Functional Status: Assistance (Pt able to participate in her own care but daughter assists with most activities   Pt's daughter reports pt's functionality has improved as her pain has been improving)  Completes ADLs independently?: No  Level of ADL dependence: Assistance  Ambulates independently?: No  Level of ambulatory dependence: Assistance  Does patient use assisted devices?: Yes  Assisted Devices (DME) used: Bedside Commode, Shower Chair, Stair Chair/Glide, Walker, Wheelchair, Hospital Bed  Does patient currently own DME?: Yes  What DME does the patient currently own?: Bedside Commode, Hospital Bed, Shower Chair, Stair Chair/Glide, Walker, Wheelchair  Does patient have a history of Outpatient Therapy (PT/OT)?: No  Does the patient have a history of Short-Term Rehab?: No  Does patient have a history of HHC?: Yes ([facility])  Does patient currently have [address]?: No    Patient Information Continued  Income Source: SSI/SSD  Does patient have prescription coverage?: Yes  Within the past 12 months, you worried that your food would run out before you got the money to buy more : Never true  Within the past 12 months, the food you bought just didn't last and you didn't have money to get more : Never true  Food insecurity resource given?: N/A  Does patient receive dialysis treatments?: Yes ([facility]; 1:00 PM; daughter transports)  Does patient have a history of substance abuse?: No  Does patient have a history of Mental Health Diagnosis?: No    Means of Transportation  In the past 12 months, has lack of transportation kept you from medical appointments or from getting medications?: No  In the past 12 months, has lack of transportation kept you from meetings, work, or from getting things needed for daily living?: No  Was application for public transport provided?: N/A    DISCHARGE DETAILS:    Discharge planning discussed with[de-identified] Pt's daughter  Freedom of Choice: Yes  Comments - Freedom of Choice: Pt's daughter declining VNA services at this time, believes pt's functionaility is improving by working with pain management outpatient  CM contacted family/caregiver?: Yes (Assessment completed with daughter via phone)  Were Treatment Team discharge recommendations reviewed with patient/caregiver?: Yes  Did patient/caregiver verbalize understanding of patient care needs?: Yes  Were patient/caregiver advised of the risks associated with not following Treatment Team discharge recommendations?: Yes    Contacts  Patient Contacts: Ridge Elkins  Relationship to Patient[de-identified] Family  Contact Method: Phone  Phone Number: [phone]  Reason/Outcome: Continuity of Care, Emergency Contact, Discharge [address]         Is the patient interested in [facility] at discharge?: No    DME Referral Provided  Referral made for DME?: No    Other Referral/Resources/Interventions Provided:  Interventions: None Indicated    Treatment Team Recommendation: Home with [facility]  Discharge Destination Plan[de-identified] Home  Transport at Discharge : Family

## 2023-03-10 NOTE — PLAN OF CARE
Post-Dialysis RN Treatment Note    Blood Pressure:  Pre 123/51 mm/Hg  Post 110/39 mmHg   EDW  61 kg    Weight:  Pre 63 6 kg   Post 61 9 kg   Mode of weight measurement: Bed Scale   Volume Removed  505 ml    Treatment duration 210 minutes    NS given  Yes, 200 ml for SBP < 110    Treatment shortened? No   Medications given during Rx None Reported   Estimated Kt/V  None Reported   Access type: Permacath/TDC   Access Issues: No    Report called to primary nurse   Yes Shawn Weinberg RN    Current hemodialysis plan of care is to remove a total of 1500 ml of fluid over a 3 1/2 hour treatment for a net of 1 liters as tolerated  Monitor vital signs every 15 minutes while on treatment for patient safety  Utilize a 3 K+ bath for serum potassium of 4 1 to maintain electrolyte balance  Report received from Shawn Weinberg RN  Plan reviewed with Dr Dedra Pires and Tori Herman via [address] [place] [address]        Problem: METABOLIC, FLUID AND ELECTROLYTES - ADULT  Goal: Electrolytes maintained within normal limits  Description: INTERVENTIONS:  - Monitor labs and assess patient for signs and symptoms of electrolyte imbalances  - Administer electrolyte replacement as ordered  - Monitor response to electrolyte replacements, including repeat lab results as appropriate  - Instruct patient on fluid and nutrition as appropriate  Outcome: Progressing  Goal: Fluid balance maintained  Description: INTERVENTIONS:  - Monitor labs   - Monitor I/O and WT  - Instruct patient on fluid and nutrition as appropriate  - Assess for signs & symptoms of volume excess or deficit  Outcome: Progressing

## 2023-03-10 NOTE — PROGRESS NOTES
Progress Note - Ruby Jimenez 82 y o  female MRN: [id number]    Unit/Bed#: [place] [address] -01 Encounter: [id number]      Assessment:  83 y/o F w perforated appendix, 3 cm abscess not amendable to IR drainage  Vss  Afebrile  abd soft, mild RLQ tenderness, non distended  Plan:  Clear liquids  Continue zosyn  Ambulate  Would resume AC w heparin gtt for DVT so able to titrate off if surgical intervention needed  Repeat ct in 72 hours or earlier if febrile or worsening pain  Subjective:   Passing flatus  Pain improved  Comes and goes  Denied fever, chills or night sweats  No cp or sob  Objective:     Vitals: Blood pressure (!) 89/37, pulse 58, temperature (!) 97 1 °F (36 2 °C), temperature source Temporal, resp  rate 18, weight 60 8 kg (134 lb 0 6 oz), SpO2 92 %  ,Body mass index is 23 01 kg/m²  Intake/Output Summary (Last 24 hours) at 3/10/2023 7529  Last data filed at 3/9/2023 1048  Gross per 24 hour   Intake 1000 ml   Output --   Net 1000 ml       Physical Exam  General: NAD  HEENT: NC/AT  MMM  Cv: RRR     Lungs: normal effort  Ab: Soft, NT/ND  Ex: no CCE  Neuro: AAOx3    Scheduled Meds:  Current Facility-Administered Medications   Medication Dose Route Frequency Provider Last Rate   • acetaminophen  650 mg Oral Q6H PRN Sandra Call PA-C     • aluminum-magnesium hydroxide-simethicone  30 mL Oral Q6H PRN Sandra Call PA-C     • atorvastatin  40 mg Oral QPM Sandra Call PA-C     • famotidine  20 mg Oral BID PRN Sandra Call PA-C     • HYDROmorphone  2 mg Intravenous Q4H PRN IRVIN Brooke-C     • insulin lispro  1-5 Units Subcutaneous Q6H [address] Desireecyoliver Call PA-C     • lactated ringers  75 mL/hr Intravenous Continuous Sandra Call PA-C 75 mL/hr (03/09/23 1410)   • letrozole  2 5 mg Oral Daily Sandra Call PA-C     • levETIRAcetam  250 mg Oral Daily PRN Sandra Call PA-C     • levETIRAcetam  500 mg Oral Q12H [address] Sandra Call PA-C     • LORazepam  0 5 mg Oral Q8H PRN Cristiana Che PA-C     • lubiprostone  24 mcg Oral BID With Meals Cristiana Che PA-C     • metoprolol succinate  25 mg Oral BID Cristiana Che PA-C     • midodrine  5 mg Oral TID IVANA Colbert MD     • ondansetron  4 mg Intravenous Q6H PRN Cristiana Che PA-C     • pantoprazole  20 mg Oral Early Morning Cristiana Che PA-C     • piperacillin-tazobactam  2 25 g Intravenous Q8H Cristiana Che PA-C 2 25 g (03/10/23 0602)   • senna-docusate sodium  1 tablet Oral Daily Cristiana Che PA-C     • simethicone  80 mg Oral Daily Cristiana Che PA-C       Continuous Infusions:lactated ringers, 75 mL/hr, Last Rate: 75 mL/hr (03/09/23 1410)      PRN Meds: •  acetaminophen  •  aluminum-magnesium hydroxide-simethicone  •  famotidine  •  HYDROmorphone  •  levETIRAcetam  •  LORazepam  •  ondansetron      Invasive Devices     Peripheral Intravenous Line  Duration           Peripheral IV 03/09/23 Proximal;Right;Ventral (anterior) Forearm 1 day          Hemodialysis Catheter  Duration           HD Permanent Double Catheter -- days                Lab, Imaging and other studies: I have personally reviewed pertinent reports      VTE Pharmacologic Prophylaxis: Sequential compression device (Venodyne)   VTE Mechanical Prophylaxis: sequential compression device

## 2023-03-10 NOTE — ASSESSMENT & PLAN NOTE
Lab Results   Component Value Date    EGFR 9 03/10/2023    EGFR 16 03/09/2023    EGFR 10 01/19/2023    CREATININE 4 24 (H) 03/10/2023    CREATININE 2 67 (H) 03/09/2023    CREATININE 3 71 (H) 01/19/2023   · Patient currently on Monday, Wednesday, Friday HD schedule  · Renal following

## 2023-03-10 NOTE — PROGRESS NOTES
[address] [place] [address]  Progress Note Luisa Martinez 1940, 82 y o  female MRN: [id number]  Unit/Bed#: [place] [address] -01 Encounter: [id number]  Primary Care Provider: Tyrese Rojas DO   Date and time admitted to hospital: 3/9/2023 12:11 AM    * Abscess, periappendiceal  Assessment & Plan  · Patient presented to the ED with her daughter following 3 weeks of post-prandial stomach pain and occasional vomiting  Daughter reports patient had appointment with GI soon, but for the last two days, the patient has not been able to tolerate any oral intake and so not able to keep down medications  · Patient reports after eating pain 10/10 epigastric pain that radiates into right lower quadrant area  · CT abdomen/pelvis demonstrating "3 4 cm rim-enhancing gas and fluid collection at the right lower quadrant adjacent to the appendiceal tip presumed to represent a periappendiceal abscess  Extensive mesenteric vessel disease as detailed above which is not significantly changed from prior exams "    Patient seen by surgery  [place] high risk for surgery, recommend IR drainage or medical treatment with IV abx  IR feels it is not amenable to drainage at this time  Patient is feeling better on greater than 24 hours of IV Zosyn    Appreciate ID help  If she symptomatically gets worse, would repeat CT scan    ESRD on dialysis [facility])  Assessment & Plan  Lab Results   Component Value Date    EGFR 9 03/10/2023    EGFR 16 03/09/2023    EGFR 10 01/19/2023    CREATININE 4 24 (H) 03/10/2023    CREATININE 2 67 (H) 03/09/2023    CREATININE 3 71 (H) 01/19/2023   · Patient currently on Monday, Wednesday, Friday HD schedule  · Renal following    Chronic atrial fibrillation ([facility] [address] )  Assessment & Plan  On metoprolol and coumadin  INR does fluctuate, possibly due to abx  Yesterday she was supratherapeutic, now subtherapeutic  Possibly due to abx      Will restart coumadin            Subjective:   Feels better  Much less abdominal pain  No fever nor chills  No complaints  Objective:     Vitals:   Temp (24hrs), Av 6 °F (36 4 °C), Min:97 1 °F (36 2 °C), Max:98 °F (36 7 °C)    Temp:  [97 1 °F (36 2 °C)-98 °F (36 7 °C)] 97 5 °F (36 4 °C)  HR:  [46-76] 51  Resp:  [14-18] 16  BP: ()/(37-51) 104/41  SpO2:  [87 %-99 %] 97 %  Body mass index is 23 01 kg/m²  Input and Output Summary (last 24 hours): Intake/Output Summary (Last 24 hours) at 3/10/2023 1431  Last data filed at 3/10/2023 1303  Gross per 24 hour   Intake 400 ml   Output --   Net 400 ml       Physical Exam:     Physical Exam  Vitals and nursing note reviewed  HENT:      Head: Normocephalic and atraumatic  Eyes:      Pupils: Pupils are equal, round, and reactive to light  Cardiovascular:      Rate and Rhythm: Normal rate and regular rhythm  Heart sounds: No murmur heard  No friction rub  No gallop  Pulmonary:      Effort: Pulmonary effort is normal       Breath sounds: Normal breath sounds  No wheezing or rales  Abdominal:      General: Bowel sounds are normal       Palpations: Abdomen is soft  Tenderness: There is no abdominal tenderness  There is no guarding or rebound  Musculoskeletal:      Right lower leg: No edema  Left lower leg: No edema                 Additional Data:     Labs:    Results from last 7 days   Lab Units 03/10/23  0600   WBC Thousand/uL 7 46   HEMOGLOBIN g/dL 9 1*   HEMATOCRIT % 28 9*   PLATELETS Thousands/uL 190   NEUTROS PCT % 72   LYMPHS PCT % 17   MONOS PCT % 9   EOS PCT % 2     Results from last 7 days   Lab Units 03/10/23  0600 23  0209   POTASSIUM mmol/L 4 1 4 6   CHLORIDE mmol/L 101 99   CO2 mmol/L 30 31   BUN mg/dL 22 12   CREATININE mg/dL 4 24* 2 67*   CALCIUM mg/dL 8 6 9 6   ALK PHOS U/L  --  138*   ALT U/L  --  8   AST U/L  --  11*     Results from last 7 days   Lab Units 23  1619   INR  1 29*     Results from last 7 days   Lab Units 03/10/23  1220 03/10/23  0554 03/10/23  0001 03/09/23 2052 03/09/23  1709 03/09/23  1250   POC GLUCOSE mg/dl 105 90 146* 138 104 110               * I Have Reviewed All Lab Data     Recent Cultures (last 7 days):     Results from last 7 days   Lab Units 03/09/23  1620   BLOOD CULTURE  Received in Microbiology Lab  Culture in Progress  Received in Microbiology Lab  Culture in Progress  Last 24 Hours Medication List:   Current Facility-Administered Medications   Medication Dose Route Frequency Provider Last Rate   • acetaminophen  650 mg Oral Q6H PRN Desireeoliver Call PA-C     • aluminum-magnesium hydroxide-simethicone  30 mL Oral Q6H PRN Desireeoliver Call, R[place]     • atorvastatin  40 mg Oral QPM Desireeoliver Call PA-C     • famotidine  20 mg Oral BID PRN Desireeoliver Call PA-C     • HYDROmorphone  2 mg Intravenous Q4H PRN Desireeoliver Call, R[place]     • insulin lispro  1-5 Units Subcutaneous Q6H [facility] Sandra Call PA-C     • letrozole  2 5 mg Oral Daily Sandra Call PA-C     • levETIRAcetam  250 mg Oral Daily PRN Desireeoliver Call PA-C     • levETIRAcetam  500 mg Oral Q12H [facility] Stella RENO Call     • LORazepam  0 5 mg Oral Q8H PRN Sandra Call, [place]     • lubiprostone  24 mcg Oral BID With Meals Sandra Call PA-C     • metoprolol succinate  25 mg Oral BID Sandra Call PA-C     • midodrine  5 mg Oral TID IVANA Blair MD     • ondansetron  4 mg Intravenous Q6H PRN Sandra Call PA-C     • pantoprazole  20 mg Oral Early Morning Sandra Call PA-C     • piperacillin-tazobactam  2 25 g Intravenous Q8H Sandra Call PA-C 2 25 g (03/10/23 0602)   • senna-docusate sodium  1 tablet Oral Daily Sandra Call PA-C     • simethicone  80 mg Oral Daily Sandra Call PA-C           VTE Pharmacologic Prophylaxis:   Pharmacologic: restart coumadin      Current Length of Stay: 1 day(s)    Current Patient Status: Inpatient       Discharge Plan:       Code Status: Level 1 - Full Code           Today, Patient Was Seen By: Elyse Rosa, DO    ** Please Note: Dictation voice to text software may have been used in the creation of this document   **

## 2023-03-10 NOTE — PROGRESS NOTES
NEPHROLOGY PROGRESS NOTE   Tiffani Cameron 82 y o  female MRN: [id number]  Unit/Bed#: [place] [address] -01 Encounter: [id number]  Reason for Consult: ESRD on HD    82-year-old female with history of ESRD on HD on Monday Wednesday Friday, hypertension, diabetes, multiple other comorbidities, who presents with reports of abdominal pain and found to have appendiceal abscess  Nephrology is consulted for management of ESRD  ASSESSMENT/PLAN:  ESRD on HD:   -receiving maintenance dialysis on Monday Wednesday Friday schedule at [facility]   -continue with HD as scheduled  -EDW: 61 kg  Access: Left IJ PermCath  Site intact  -Nonfunctioning RUE AVF  Blood pressure: Chronic hypotension on midodrine   -continue UF with HD as BP tolerates  -OP medications: Amlodipine and metoprolol    -currently maintained on: Metoprolol 25 mg 2 times per day, midodrine 5 mg prior to meals    -Holding amlodipine as blood pressure is low  Anemia in CKD:   -Hgb currently 9 1   -JEMIMA: Mircera 30 mcg every 4 weeks at [facility]   -Receives Venofer 50 mg weekly as outpatient  Would hold until free of infection   -goal Hgb 10-12 g/dL  -continue to monitor and transfuse as needed for Hgb <7 0  MBD in CKD:   -continue to monitor PTH, phos, and Mg as OP    -most recent labs show mag level low, Phos level normal   -recommend renal diet  Volume status: Examining euvolemic to hypovolemic   -Recommend discontinuation of IV fluids   -continue UF as BP allows  -recommend fluid restriction  Electrolytes: Stable and acceptable   -continue to monitor and trend with HD  Perforated appendix: with 3 cm abscess, not amenable to IR drainage   -General surgery team following   -Currently on IV antibiotics and trending WBC count   -Resumed on clear liquid diet  Other: GERD, diabetes, atrial fibrillation on Coumadin, breast cancer, ovarian cancer, seizures      Disposition: Requiring additional stay due to medical needs     SUBJECTIVE:  The patient is resting in her bed  Her daughter is at the bedside  She denies any chest discomfort or shortness of breath  She remains on room air  She denies nausea but reports having some diarrhea  She is currently being advanced to clear liquid diet  She denies current abdominal discomfort      OBJECTIVE:  Current Weight: Weight - Scale: 60 8 kg (134 lb 0 6 oz)  Vitals:    03/10/23 1103 03/10/23 1133 03/10/23 1200 03/10/23 1230   BP: (!) 125/44 123/51 (!) 103/46 113/51   BP Location:  Left leg     Pulse: 58 (!) 47 (!) 49 (!) 50   Resp: 15 16 16 16   Temp: 97 5 °F (36 4 °C) 97 5 °F (36 4 °C)     TempSrc:  Temporal     SpO2: 95% 97% 96% 97%   Weight:           Intake/Output Summary (Last 24 hours) at 3/10/2023 1239  Last data filed at 3/10/2023 1133  Gross per 24 hour   Intake 200 ml   Output --   Net 200 ml     General: NAD  Skin: warm, dry, intact, no rash  HEENT: Moist mucous membranes, sclera anicteric, normocephalic, atraumatic  Neck: No apparent JVD appreciated  Chest: lung sounds clear B/L, on RA, tunneled dialysis catheter  CVS:Regular rate and rhythm, no murmer   Abdomen: Soft, round, non-tender, +BS  Extremities: No B/L LE edema present, nonfunctioning right upper extremity aVF  Neuro: alert and oriented  Psych: appropriate mood and affect     Medications:    Current Facility-Administered Medications:   •  acetaminophen (TYLENOL) tablet 650 mg, 650 mg, Oral, Q6H PRN, Abbe Casper PA-C  •  aluminum-magnesium hydroxide-simethicone (MYLANTA) oral suspension 30 mL, 30 mL, Oral, Q6H PRN, Abbe Casper PA-C  •  atorvastatin (LIPITOR) tablet 40 mg, 40 mg, Oral, QPM, Abbe Casper PA-C, 40 mg at 03/09/23 1711  •  famotidine (PEPCID) tablet 20 mg, 20 mg, Oral, BID PRN, Abbe Casper PA-C  •  HYDROmorphone (DILAUDID) injection 2 mg, 2 mg, Intravenous, Q4H PRN, Abbe Casper PA-C, 2 mg at 03/09/23 2008  •  insulin lispro (HumaLOG) 100 units/mL subcutaneous injection 1-5 Units, 1-5 Units, Subcutaneous, Q6H [facility] **AND** Fingerstick Glucose (POCT), , , Q6H, Abby Webb PA-C  •  letrozole [facility]) tablet 2 5 mg, 2 5 mg, Oral, Daily, Abby Webb PA-C, 2 5 mg at 03/10/23 2145  •  levETIRAcetam (KEPPRA) oral solution 250 mg, 250 mg, Oral, Daily PRN, Abby Webb PA-C  •  levETIRAcetam (KEPPRA) oral solution 500 mg, 500 mg, Oral, Q12H [facility], Abby Webb PA-C, 500 mg at 03/10/23 7431  •  LORazepam (ATIVAN) tablet 0 5 mg, 0 5 mg, Oral, Q8H PRN, Abby Webb PA-C  •  lubiprostone (AMITIZA) capsule 24 mcg, 24 mcg, Oral, BID With Meals, Abby Webb PA-C, 24 mcg at 03/09/23 1711  •  metoprolol succinate (TOPROL-XL) 24 hr tablet 25 mg, 25 mg, Oral, BID, Abby Webb PA-C, 25 mg at 03/09/23 2104  •  midodrine (PROAMATINE) tablet 5 mg, 5 mg, Oral, TID AC, Lian Campa MD, 5 mg at 03/10/23 1043  •  ondansetron [facility]) injection 4 mg, 4 mg, Intravenous, Q6H PRN, Abby Webb PA-C, 4 mg at 03/09/23 1938  •  pantoprazole (PROTONIX) EC tablet 20 mg, 20 mg, Oral, Early Morning, Abby Webb PA-C, 20 mg at 03/10/23 0559  •  piperacillin-tazobactam (ZOSYN) 2 25 g in sodium chloride 0 9 % 50 mL IVPB, 2 25 g, Intravenous, Q8H, Abby Webb PA-C, Last Rate: 100 mL/hr at 03/10/23 0602, 2 25 g at 03/10/23 0602  •  senna-docusate sodium (SENOKOT S) 8 6-50 mg per tablet 1 tablet, 1 tablet, Oral, Daily, Abby Webb PA-C, 1 tablet at 03/10/23 1788  •  simethicone (MYLICON) chewable tablet 80 mg, 80 mg, Oral, Daily, Abby Webb PA-C, 80 mg at 03/10/23 7329    Laboratory Results:  Results from last 7 days   Lab Units 03/10/23  0600 03/09/23  0209   WBC Thousand/uL 7 46 10 53*   HEMOGLOBIN g/dL 9 1* 10 8*   HEMATOCRIT % 28 9* 34 1*   PLATELETS Thousands/uL 190 212   SODIUM mmol/L 138 139   POTASSIUM mmol/L 4 1 4 6   CHLORIDE mmol/L 101 99   CO2 mmol/L 30 31   BUN mg/dL 22 12   CREATININE mg/dL 4 24* 2 67*   CALCIUM mg/dL 8 6 9 6   MAGNESIUM mg/dL 1 7*  --    PHOSPHORUS mg/dL 3 4  --    ALK PHOS U/L  --  138*   ALT U/L  --  8   AST U/L  --  11*

## 2023-03-10 NOTE — PLAN OF CARE
Problem: MOBILITY - ADULT  Goal: Maintain or return to baseline ADL function  Description: INTERVENTIONS:  -  Assess patient's ability to carry out ADLs; assess patient's baseline for ADL function and identify physical deficits which impact ability to perform ADLs (bathing, care of mouth/teeth, toileting, grooming, dressing, etc )  - Assess/evaluate cause of self-care deficits   - Assess range of motion  - Assess patient's mobility; develop plan if impaired  - Assess patient's need for assistive devices and provide as appropriate  - Encourage maximum independence but intervene and supervise when necessary  - Involve family in performance of ADLs  - Assess for home care needs following discharge   - Consider OT consult to assist with ADL evaluation and planning for discharge  - Provide patient education as appropriate  Outcome: Progressing  Goal: Maintains/Returns to pre admission functional level  Description: INTERVENTIONS:  - Perform BMAT or MOVE assessment daily    - Set and communicate daily mobility goal to care team and patient/family/caregiver  - Collaborate with rehabilitation services on mobility goals if consulted  - Perform Range of Motion 3 times a day  - Reposition patient every 2 hours    - Dangle patient 3 times a day  - Stand patient 3 times a day  - Ambulate patient 3 times a day  - Out of bed to chair 3 times a day   - Out of bed for meals 3 times a day  - Out of bed for toileting  - Record patient progress and toleration of activity level   Outcome: Progressing     Problem: INFECTION - ADULT  Goal: Absence or prevention of progression during hospitalization  Description: INTERVENTIONS:  - Assess and monitor for signs and symptoms of infection  - Monitor lab/diagnostic results  - Monitor all insertion sites, i e  indwelling lines, tubes, and drains  - Monitor endotracheal if appropriate and nasal secretions for changes in amount and color  - [place] appropriate cooling/warming therapies per order  - Administer medications as ordered  - Instruct and encourage patient and family to use good hand hygiene technique  - Identify and instruct in appropriate isolation precautions for identified infection/condition  Outcome: Progressing  Goal: Absence of fever/infection during neutropenic period  Description: INTERVENTIONS:  - Monitor WBC    Outcome: Progressing

## 2023-03-10 NOTE — ASSESSMENT & PLAN NOTE
On metoprolol and coumadin  INR does fluctuate, possibly due to abx  Yesterday she was supratherapeutic, now subtherapeutic  Possibly due to abx      Will restart coumadin

## 2023-03-10 NOTE — PLAN OF CARE
Problem: PHYSICAL THERAPY ADULT  Goal: Performs mobility at highest level of function for planned discharge setting  See evaluation for individualized goals  Description: Treatment/Interventions: Functional transfer training, LE strengthening/ROM, Therapeutic exercise, Patient/family training, Equipment eval/education, Bed mobility, Gait training, Spoke to nursing, OT, Family, Compensatory technique education          See flowsheet documentation for full assessment, interventions and recommendations  Note: Prognosis: Good  Problem List: Decreased strength, Impaired balance, Decreased mobility, Pain  Assessment: Floyd Murphy is a 82 y o  female admitted to [facility] on 3/9/2023 for Abscess, periappendiceal  Conservative management  PT was consulted and pt was seen on 3/10/2023 for mobility assessment and d/c planning  Pt presents w high fall risk, multiple lines  At baseline gets A for ADLs and mobility prn  Pt is currently functioning at a S-min A for bed mobility, S for transf and ambulation w RW  Pt demonstrated ability to ambulate limited household distances without difficulty  At risk for falls given gait deviations however never ambulates without S at home  Per dtr pt is functioning at baseline  Dtr reports mobility and indep w ADLs fluctuates on pain control; given pain currently controlled pt may benefit from continued skilled IP PT to address the above mentioned impairments of strength, balance in order to maximize recovery and increase functional independence when completing mobility and ADLs  At this time PT recommendations for d/c are home w continued family support  Barriers to Discharge: None     PT Discharge Recommendation: No rehabilitation needs (dtr declining HHPT at this time, would like to focus on pain management)    See flowsheet documentation for full assessment

## 2023-03-10 NOTE — PROGRESS NOTES
Progress Note - Infectious Disease   Matt Buck 82 y o  female MRN: [id number]  Unit/Bed#: [place] [address] - Encounter: [id number]      Impression/Plan:  1  Appendiceal abscess:  - Patient with about 3 weeks of abdominal pains, found on CT 3/9 to have a 3 4 cm rim-enhancing gas and fluid collection in RLQ, adjacent to appendix  Abdominal exam benign, no clinical sings of peritonitis  She has been afebrile, with mild leukocytosis that is now resolved  IR does not feel can access collection for percutaneous drainage       - would continue IV Pip-Tazo 2 25g q8h (dosed for HD)  to cover GI araceli and anaerobes  - Surgery planning to repeat CT in 72 hours; will follow up  - ideally would prefer some form of source control with drainage and cultures to help guide therapy  - trend fever curve and WBCs  - follow up blood cultures  - antibiotic plan pending intervention     2  ESRD on HD:   - Has left IJ PermCath and a non-functional right arm AV fistula  No tenderness at either site or erythema      - renally adjust antibiotics  - Pip-Tazo is 2 25 g every 8 hours     3  Afib/Hx of DVT on AC:  - Noted, on warfarin     4  Hx of Breast CA/Ovarian CA:  - had metastasis to chest wall, s/p radiation in 2018  Also had ovarian cancer with total abdominal hysterectomy       - currently on letrozole     5  T2DM     6  Seizure disorder:  - on home [address] and recommendations were discussed with primary team   ID will plan see see again 3/13, call weekend provider sooner with questions    Antibiotics:  Pip-Tazo: 3    Subjective:  Patient has no fever, chills, sweats  Abdominal pain minimal currently and stable  No new symptoms      Objective:  Vitals:  Temp:  [97 1 °F (36 2 °C)-98 °F (36 7 °C)] 97 1 °F (36 2 °C)  HR:  [58-76] 58  Resp:  [16-18] 18  BP: ()/(37-67) 89/37  SpO2:  [87 %-100 %] 92 %  Temp (24hrs), Av 7 °F (36 5 °C), Min:97 1 °F (36 2 °C), Max:98 °F (36 7 °C)  Current: Temperature: (!) 97 1 °F (36 2 °C)    Physical Exam:   General Appearance:  Alert, interactive, nontoxic, no acute distress  Throat: Oropharynx moist without lesions  Lungs:   Clear to auscultation bilaterally; no wheezes, rhonchi or rales; respirations unlabored   Heart:  RRR; no murmur, rub or gallop   Abdomen:   Soft, non-tender, non-distended, positive bowel sounds  Extremities: No clubbing, cyanosis or edema   Skin: No new rashes or lesions  No draining wounds noted  Labs: All pertinent labs and imaging studies were personally reviewed  Results from last 7 days   Lab Units 03/10/23  0600 03/09/23  0209   WBC Thousand/uL 7 46 10 53*   HEMOGLOBIN g/dL 9 1* 10 8*   PLATELETS Thousands/uL 190 212     Results from last 7 days   Lab Units 03/10/23  0600 03/09/23  0209   SODIUM mmol/L 138 139   POTASSIUM mmol/L 4 1 4 6   CHLORIDE mmol/L 101 99   CO2 mmol/L 30 31   BUN mg/dL 22 12   CREATININE mg/dL 4 24* 2 67*   EGFR ml/min/1 73sq m 9 16   CALCIUM mg/dL 8 6 9 6   AST U/L  --  11*   ALT U/L  --  8   ALK PHOS U/L  --  138*       Micro:  Results from last 7 days   Lab Units 03/09/23  1620   BLOOD CULTURE  Received in Microbiology Lab  Culture in Progress  Received in Microbiology Lab  Culture in Progress  Imaging:    CTA abdomen/pelvis 3/9/23:  3 4 cm rim-enhancing gas and fluid collection at the right lower quadrant adjacent to the appendiceal tip presumed to represent a periappendiceal abscess      Extensive mesenteric vessel disease as detailed above which is not significantly changed from prior exams        Destiny Geiger MD  [facility]

## 2023-03-11 LAB
ANION GAP SERPL CALCULATED.3IONS-SCNC: 11 MMOL/L (ref 4–13)
BUN SERPL-MCNC: 7 MG/DL (ref 5–25)
CALCIUM SERPL-MCNC: 8.2 MG/DL (ref 8.4–10.2)
CHLORIDE SERPL-SCNC: 105 MMOL/L (ref 96–108)
CO2 SERPL-SCNC: 28 MMOL/L (ref 21–32)
CREAT SERPL-MCNC: 2.36 MG/DL (ref 0.6–1.3)
ERYTHROCYTE [DISTWIDTH] IN BLOOD BY AUTOMATED COUNT: 13.3 % (ref 11.6–15.1)
ERYTHROCYTE [DISTWIDTH] IN BLOOD BY AUTOMATED COUNT: 13.5 % (ref 11.6–15.1)
GFR SERPL CREATININE-BSD FRML MDRD: 18 ML/MIN/1.73SQ M
GLUCOSE SERPL-MCNC: 93 MG/DL (ref 65–140)
HCT VFR BLD AUTO: 28.4 % (ref 34.8–46.1)
HCT VFR BLD AUTO: 31.2 % (ref 34.8–46.1)
HGB BLD-MCNC: 8.9 G/DL (ref 11.5–15.4)
HGB BLD-MCNC: 9.4 G/DL (ref 11.5–15.4)
INR PPP: 1.38 (ref 0.84–1.19)
MCH RBC QN AUTO: 30.8 PG (ref 26.8–34.3)
MCH RBC QN AUTO: 31.1 PG (ref 26.8–34.3)
MCHC RBC AUTO-ENTMCNC: 30.1 G/DL (ref 31.4–37.4)
MCHC RBC AUTO-ENTMCNC: 31.3 G/DL (ref 31.4–37.4)
MCV RBC AUTO: 102 FL (ref 82–98)
MCV RBC AUTO: 99 FL (ref 82–98)
PLATELET # BLD AUTO: 189 THOUSANDS/UL (ref 149–390)
PLATELET # BLD AUTO: 215 THOUSANDS/UL (ref 149–390)
PMV BLD AUTO: 10 FL (ref 8.9–12.7)
PMV BLD AUTO: 10.5 FL (ref 8.9–12.7)
POTASSIUM SERPL-SCNC: 3.8 MMOL/L (ref 3.5–5.3)
PROTHROMBIN TIME: 17 SECONDS (ref 11.6–14.5)
RBC # BLD AUTO: 2.86 MILLION/UL (ref 3.81–5.12)
RBC # BLD AUTO: 3.05 MILLION/UL (ref 3.81–5.12)
SODIUM SERPL-SCNC: 144 MMOL/L (ref 135–147)
WBC # BLD AUTO: 5.02 THOUSAND/UL (ref 4.31–10.16)
WBC # BLD AUTO: 6.59 THOUSAND/UL (ref 4.31–10.16)

## 2023-03-11 RX ORDER — HEPARIN SODIUM 1000 [USP'U]/ML
2400 INJECTION, SOLUTION INTRAVENOUS; SUBCUTANEOUS EVERY 6 HOURS PRN
Status: DISCONTINUED | OUTPATIENT
Start: 2023-03-11 | End: 2023-03-11

## 2023-03-11 RX ORDER — HEPARIN SODIUM 1000 [USP'U]/ML
4800 INJECTION, SOLUTION INTRAVENOUS; SUBCUTANEOUS EVERY 6 HOURS PRN
Status: DISCONTINUED | OUTPATIENT
Start: 2023-03-11 | End: 2023-03-11

## 2023-03-11 RX ORDER — HEPARIN SODIUM 10000 [USP'U]/100ML
3-30 INJECTION, SOLUTION INTRAVENOUS
Status: DISCONTINUED | OUTPATIENT
Start: 2023-03-11 | End: 2023-03-11

## 2023-03-11 RX ADMIN — LEVETIRACETAM 500 MG: 100 SOLUTION ORAL at 08:22

## 2023-03-11 RX ADMIN — PANTOPRAZOLE SODIUM 20 MG: 20 TABLET, DELAYED RELEASE ORAL at 05:49

## 2023-03-11 RX ADMIN — ATORVASTATIN CALCIUM 40 MG: 40 TABLET, FILM COATED ORAL at 17:22

## 2023-03-11 RX ADMIN — SIMETHICONE 80 MG: 80 TABLET, CHEWABLE ORAL at 08:22

## 2023-03-11 RX ADMIN — Medication 250 MG: at 08:22

## 2023-03-11 RX ADMIN — PIPERACILLIN AND TAZOBACTAM 2.25 G: 2; .25 INJECTION, POWDER, LYOPHILIZED, FOR SOLUTION INTRAVENOUS at 20:59

## 2023-03-11 RX ADMIN — LEVETIRACETAM 500 MG: 100 SOLUTION ORAL at 20:59

## 2023-03-11 RX ADMIN — Medication 250 MG: at 17:22

## 2023-03-11 RX ADMIN — MIDODRINE HYDROCHLORIDE 5 MG: 5 TABLET ORAL at 05:49

## 2023-03-11 RX ADMIN — MIDODRINE HYDROCHLORIDE 5 MG: 5 TABLET ORAL at 12:09

## 2023-03-11 RX ADMIN — METOPROLOL SUCCINATE 25 MG: 25 TABLET, EXTENDED RELEASE ORAL at 08:22

## 2023-03-11 RX ADMIN — MIDODRINE HYDROCHLORIDE 5 MG: 5 TABLET ORAL at 17:22

## 2023-03-11 RX ADMIN — PIPERACILLIN AND TAZOBACTAM 2.25 G: 2; .25 INJECTION, POWDER, LYOPHILIZED, FOR SOLUTION INTRAVENOUS at 14:20

## 2023-03-11 RX ADMIN — LETROZOLE 2.5 MG: 2.5 TABLET, FILM COATED ORAL at 08:21

## 2023-03-11 RX ADMIN — PIPERACILLIN AND TAZOBACTAM 2.25 G: 2; .25 INJECTION, POWDER, LYOPHILIZED, FOR SOLUTION INTRAVENOUS at 05:50

## 2023-03-11 NOTE — ASSESSMENT & PLAN NOTE
Lab Results   Component Value Date    EGFR 18 03/11/2023    EGFR 9 03/10/2023    EGFR 16 03/09/2023    CREATININE 2 36 (H) 03/11/2023    CREATININE 4 24 (H) 03/10/2023    CREATININE 2 67 (H) 03/09/2023   · Patient currently on Monday, Wednesday, Friday HD schedule  · Renal following

## 2023-03-11 NOTE — PROGRESS NOTES
Current RN unable to successfully draw labs using ultrasound x2 attempts  S2 charge RN attempted x2 using ultrasound  Dr Sherrye Meigs and Dr Alli Massey notified  Team in agreement to discontinue heparin drip at this time

## 2023-03-11 NOTE — ASSESSMENT & PLAN NOTE
· Patient presented to the ED with her daughter following 3 weeks of post-prandial stomach pain and occasional vomiting  Daughter reports patient had appointment with GI soon, but for the last two days, the patient has not been able to tolerate any oral intake and so not able to keep down medications  · Patient reports after eating pain 10/10 epigastric pain that radiates into right lower quadrant area  · CT abdomen/pelvis demonstrating "3 4 cm rim-enhancing gas and fluid collection at the right lower quadrant adjacent to the appendiceal tip presumed to represent a periappendiceal abscess  Extensive mesenteric vessel disease as detailed above which is not significantly changed from prior exams "    Patient seen by surgery  [place] high risk for surgery, recommend IR drainage or medical treatment with IV abx  IR feels it is not amenable to drainage at this time  Patient is feeling better on greater than 24 hours of IV Zosyn    Appreciate ID help  Surgery planning possible repeat CT scan tomorrow

## 2023-03-11 NOTE — PROGRESS NOTES
[facility]  Progress Note Larry Mao 1940, 82 y o  female MRN: [id number]  Unit/Bed#: [place] [address] -01 Encounter: [id number]  Primary Care Provider: Espiridion Call, DO   Date and time admitted to hospital: 3/9/2023 12:11 AM    * Abscess, periappendiceal  Assessment & Plan  · Patient presented to the ED with her daughter following 3 weeks of post-prandial stomach pain and occasional vomiting  Daughter reports patient had appointment with GI soon, but for the last two days, the patient has not been able to tolerate any oral intake and so not able to keep down medications  · Patient reports after eating pain 10/10 epigastric pain that radiates into right lower quadrant area  · CT abdomen/pelvis demonstrating "3 4 cm rim-enhancing gas and fluid collection at the right lower quadrant adjacent to the appendiceal tip presumed to represent a periappendiceal abscess  Extensive mesenteric vessel disease as detailed above which is not significantly changed from prior exams "    Patient seen by surgery  [place] high risk for surgery, recommend IR drainage or medical treatment with IV abx  IR feels it is not amenable to drainage at this time  Patient is feeling better on greater than 24 hours of IV Zosyn    Appreciate ID help  Surgery planning possible repeat CT scan tomorrow  History of DVT (deep vein thrombosis)  Assessment & Plan  Surgery recommends holding off on coumadin in case she needs intervention  On heparin drip    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    ESRD on dialysis [facility])  Assessment & Plan  Lab Results   Component Value Date    EGFR 18 03/11/2023    EGFR 9 03/10/2023    EGFR 16 03/09/2023    CREATININE 2 36 (H) 03/11/2023    CREATININE 4 24 (H) 03/10/2023    CREATININE 2 67 (H) 03/09/2023   · Patient currently on Monday, Wednesday, Friday HD schedule  · Renal following            Subjective:   Feels a little abdominal pain  No worse   No fever nor chills    Pain about 1 out of 10      Objective:     Vitals:   Temp (24hrs), Av 3 °F (36 8 °C), Min:97 9 °F (36 6 °C), Max:98 8 °F (37 1 °C)    Temp:  [97 9 °F (36 6 °C)-98 8 °F (37 1 °C)] 98 3 °F (36 8 °C)  HR:  [52-58] 54  Resp:  [16-18] 16  BP: ()/(34-56) 79/34  SpO2:  [92 %-95 %] 92 %  Body mass index is 23 01 kg/m²  Input and Output Summary (last 24 hours): Intake/Output Summary (Last 24 hours) at 3/11/2023 1505  Last data filed at 3/11/2023 0747  Gross per 24 hour   Intake 50 ml   Output --   Net 50 ml       Physical Exam:     Physical Exam  Vitals and nursing note reviewed  HENT:      Head: Normocephalic and atraumatic  Eyes:      Pupils: Pupils are equal, round, and reactive to light  Cardiovascular:      Rate and Rhythm: Normal rate and regular rhythm  Heart sounds: No murmur heard  No friction rub  No gallop  Pulmonary:      Effort: Pulmonary effort is normal       Breath sounds: Normal breath sounds  No wheezing or rales  Abdominal:      General: Bowel sounds are normal       Palpations: Abdomen is soft  Tenderness: There is abdominal tenderness (mild RLQ pain  no rebound, no guarding)  Musculoskeletal:      Right lower leg: No edema  Left lower leg: No edema          Additional Data:     Labs:    Results from last 7 days   Lab Units 23  1102 23  0632 03/10/23  0600   WBC Thousand/uL 6 59   < > 7 46   HEMOGLOBIN g/dL 9 4*   < > 9 1*   HEMATOCRIT % 31 2*   < > 28 9*   PLATELETS Thousands/uL 215   < > 190   NEUTROS PCT %  --   --  72   LYMPHS PCT %  --   --  17   MONOS PCT %  --   --  9   EOS PCT %  --   --  2    < > = values in this interval not displayed       Results from last 7 days   Lab Units 23  0632 03/10/23  0600 23  0209   POTASSIUM mmol/L 3 8   < > 4 6   CHLORIDE mmol/L 105   < > 99   CO2 mmol/L 28   < > 31   BUN mg/dL 7   < > 12   CREATININE mg/dL 2 36*   < > 2 67*   CALCIUM mg/dL 8 2*   < > 9 6   ALK PHOS U/L  -- --  138*   ALT U/L  --   --  8   AST U/L  --   --  11*    < > = values in this interval not displayed  Results from last 7 days   Lab Units 03/11/23  0632   INR  1 38*     Results from last 7 days   Lab Units 03/10/23  1747 03/10/23  1220 03/10/23  0554 03/10/23  0001 03/09/23  2052 03/09/23  1709 03/09/23  1250   POC GLUCOSE mg/dl 94 105 90 146* 138 104 110               * I Have Reviewed All Lab Data     Recent Cultures (last 7 days):     Results from last 7 days   Lab Units 03/09/23  1620   BLOOD CULTURE  No Growth at 24 hrs  No Growth at 24 hrs           Last 24 Hours Medication List:   Current Facility-Administered Medications   Medication Dose Route Frequency Provider Last Rate   • acetaminophen  650 mg Oral Q6H PRN Vipul Roy PA-C     • aluminum-magnesium hydroxide-simethicone  30 mL Oral Q6H PRN Vipul Roy PA-C     • atorvastatin  40 mg Oral QPM Vipul Roy PA-C     • famotidine  20 mg Oral BID PRN Vipul Roy PA-C     • HYDROmorphone  2 mg Intravenous Q4H PRN Vipul Roy PA-C     • letrozole  2 5 mg Oral Daily Vipul Roy PA-C     • levETIRAcetam  250 mg Oral Daily PRN Vipul Roy PA-C     • levETIRAcetam  500 mg Oral Q12H [facility] Vipul Roy PA-C     • LORazepam  0 5 mg Oral Q8H PRN Vipul Roy PA-C     • lubiprostone  24 mcg Oral BID With Meals Vipul Roy PA-C     • metoprolol succinate  25 mg Oral BID Vipul Roy PA-C     • midodrine  5 mg Oral TID AC Dina Levine MD     • ondansetron  4 mg Intravenous Q6H PRN Vipul Roy PA-C     • pantoprazole  20 mg Oral Early Morning Vipul Roy PA-C     • piperacillin-tazobactam  2 25 g Intravenous Q8H Vipul Roy PA-C 2 25 g (03/11/23 0390)   • saccharomyces boulardii  250 mg Oral BID Osiris Pedraza PA-C     • simethicone  80 mg Oral Daily Vipul Roy PA-C           VTE Pharmacologic Prophylaxis:   Pharmacologic: Heparin Drip      Current Length of Stay: 2 day(s)    Current Patient Status: Inpatient       Discharge Plan: repeat CT tomorrow    Code Status: Level 1 - Full Code           Today, Patient Was Seen By: Doretha Landis DO    ** Please Note: Dictation voice to text software may have been used in the creation of this document   **

## 2023-03-11 NOTE — PROGRESS NOTES
Progress Note - Berkley Barrett 82 y o  female MRN: [id number]    Unit/Bed#: [place] [address] -01 Encounter: [id number]      Assessment:  83 y/o F w perforated appendix, 3 cm abscess not amendable to IR drainage  Afebrile  abd soft,  RLQ tenderness, non distended  Plan:  Advance to soft diet  Continue zosyn  Ambulate  Would resume AC w heparin gtt for DVT so able to titrate off if surgical intervention needed  Please hold warfarin given possible intervention  Repeat CT AP on 3/12    Subjective:   No acute events overnight  Afebrile  Tolerating liquid diet  No nausea, or vomiting, fevers or chills  Pain improved  Objective:     Vitals: Blood pressure 117/56, pulse 58, temperature 98 8 °F (37 1 °C), resp  rate 18, weight 60 8 kg (134 lb 0 6 oz), SpO2 95 %  ,Body mass index is 23 01 kg/m²  Intake/Output Summary (Last 24 hours) at 3/11/2023 0832  Last data filed at 3/11/2023 0747  Gross per 24 hour   Intake 750 ml   Output 1005 ml   Net -255 ml       Physical Exam:  General: No acute distress, alert and oriented  CV: Well perfused, regular rate and rhythm  Lungs: Normal work of breathing, no increased respiratory effort  Abdomen: Soft, focally tender in RLQ, non-distended    Extremities: No clubbing or cyanosis  Skin: Warm, dry    Scheduled Meds:  Current Facility-Administered Medications   Medication Dose Route Frequency Provider Last Rate   • acetaminophen  650 mg Oral Q6H PRN Kajal Bolden PA-C     • aluminum-magnesium hydroxide-simethicone  30 mL Oral Q6H PRN Kajal Bolden PA-C     • atorvastatin  40 mg Oral QPM Kajal Bolden PA-C     • famotidine  20 mg Oral BID PRN Kajal Bolden PA-C     • HYDROmorphone  2 mg Intravenous Q4H PRN Kajal Bolden PA-C     • letrozole  2 5 mg Oral Daily Kajal Bolden PA-C     • levETIRAcetam  250 mg Oral Daily PRN Kajal Bolden PA-C     • levETIRAcetam  500 mg Oral Q12H [facility] Kajal Bolden PA-C     • LORazepam  0 5 mg Oral Q8H PRN Bonnie Rae Raheem Aguilar PA-C     • lubiprostone  24 mcg Oral BID With Meals Sarahy Thornton PA-C     • metoprolol succinate  25 mg Oral BID Sarahy Thornton PA-C     • midodrine  5 mg Oral TID [facility] [place] Diane Sage MD     • ondansetron  4 mg Intravenous Q6H PRN Sarahy Thornton PA-C     • pantoprazole  20 mg Oral Early Morning Sarahy Thornton PA-C     • piperacillin-tazobactam  2 25 g Intravenous Q8H Sarahy Thornton PA-C Stopped (03/11/23 0747)   • saccharomyces boulardii  250 mg Oral BID Osiris Pedraza PA-C     • simethicone  80 mg Oral Daily Sarahy Thornton PA-C     • warfarin  5 mg Oral Daily (warfarin) Jo Rosa,        Continuous Infusions:   PRN Meds: •  acetaminophen  •  aluminum-magnesium hydroxide-simethicone  •  famotidine  •  HYDROmorphone  •  levETIRAcetam  •  LORazepam  •  ondansetron      Invasive Devices     Peripheral Intravenous Line  Duration           Peripheral IV 03/09/23 Proximal;Right;Ventral (anterior) Forearm 2 days    Peripheral IV 03/10/23 Right;Ventral (anterior) Forearm <1 day          Hemodialysis Catheter  Duration           HD Permanent Double Catheter -- days                Lab, Imaging and other studies: I have personally reviewed pertinent reports      VTE Pharmacologic Prophylaxis: Sequential compression device (Venodyne)   VTE Mechanical Prophylaxis: sequential compression device

## 2023-03-11 NOTE — ASSESSMENT & PLAN NOTE
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

## 2023-03-11 NOTE — OCCUPATIONAL THERAPY NOTE
Occupational Therapy Evaluation  TIME ERROR: 9:32-9:46     Patient Name: Neil Schulz  Today's Date: 3/11/2023  Problem List  Principal Problem:    Abscess, periappendiceal  Active Problems:    Chronic atrial fibrillation ([facility] )    ESRD on dialysis ([facility] )    Gastroesophageal reflux disease    History of DVT (deep vein thrombosis)    Type 2 diabetes mellitus with chronic kidney disease on chronic dialysis, with long-term current use of insulin (HCC)    Breast cancer (HCC)    Seizure (HCC)    Continuous opioid dependence ([facility] )    Iron deficiency anemia    Past Medical History  Past Medical History:   Diagnosis Date   • Breast cancer ([facility] )    • Hypertension    • [facility]    • Renal disorder    • Skin cancer      Past Surgical History  Past Surgical History:   Procedure Laterality Date   • BREAST SURGERY     •  SECTION     • EXPLORATORY LAPAROTOMY     • GALLBLADDER SURGERY     • HYSTERECTOMY     • IR DRAINAGE TUBE PLACEMENT  3/9/2023   • IR THORACENTESIS  2022   • IR THORACENTESIS  23 1106   OT Last Visit   OT Visit Date 23   Note Type   Note type Evaluation   Pain Assessment   Pain Assessment Tool 0-10   Pain Score 7   Pain Location/Orientation Location: Abdomen   Hospital Pain Intervention(s) Repositioned; Ambulation/increased activity; Emotional support   Restrictions/Precautions   Other Precautions Chair Alarm; Bed Alarm;Multiple lines; Fall Risk;Cognitive   Home Living   Type of [address] [place] [address] Two level;Bed/bath upstairs; Ramped entrance   Bathroom Shower/Tub Walk-in shower   Bathroom Toilet Standard   Bathroom Equipment Shower chair;Commode   Home Equipment Wheelchair-manual;Other (Comment); Stair glide; Hospital bed  (rollator)   Prior Function   Level of [place] Needs assistance with ADLs; Needs assistance with functional mobility; Needs assistance with IADLS   Lives With Daughter   Receives Help From Family   IADLs Family/Friend/Other provides transportation; Family/Friend/Other provides meals; Family/Friend/Other provides medication management   Falls in the last 6 months 0   Comments (-) home alone, family support  Per chart review, can self feed and complete grooming tasks with s/u  Mobility is supervised  Lifestyle   Reciprocal Relationships daughter, nieces   ADL   Where Assessed Edge of bed   Eating Assistance 5  Supervision/Setup   Grooming Assistance 5  Supervision/Setup   UB Bathing Assistance 4  Minimal Assistance   LB [address] 3  Moderate [address] 4  [address] 3  Moderate [address]  4  Minimal Assistance   Bed Mobility   Supine to Sit 5  Supervision   Additional items Bedrails; Increased time required   Sit to Supine 4  Minimal assistance   Additional items Assist x 1;LE management; Bedrails   Transfers   Sit to Stand 5  Supervision   Additional items Increased time required; Other  (RW)   Stand to Sit 5  Supervision   Additional items Increased time required; Other  (RW)   Functional Mobility   Functional Mobility 4  Minimal assistance   Additional items Rolling walker   Balance   Static Sitting Fair +   Dynamic Sitting Fair   Static Standing Fair -   Dynamic Standing Poor +   Ambulatory Poor +   Activity Tolerance   Activity Tolerance Patient limited by pain; Patient limited by fatigue   Nurse Made Aware Yes   RUE Assessment   RUE Assessment WFL   LUE Assessment   LUE Assessment WFL   Hand Function   Gross Motor Coordination Functional   Fine Motor Coordination Functional   Sensation   Light Touch No apparent deficits   Proprioception   Proprioception No apparent deficits   Perception   Inattention/Neglect Appears intact   Cognition   Arousal/Participation Responsive; Cooperative   Attention Attends with cues to redirect   Orientation Level Oriented to person;Oriented to place;Oriented to time   Memory Decreased recall of precautions   Following Commands Follows one step commands without difficulty   Assessment   Limitation Decreased ADL status; Decreased UE strength;Decreased Safe judgement during ADL;Decreased endurance;Decreased high-level ADLs; Decreased self-care trans   Prognosis Fair   Assessment Patient is a 82y o  year old female seen for OT eval s/p admit to [facility] on 3/9/2023 with periappendiceal abscess  Comorbidities affecting pt’s functional performance include a significant PMH of: a-fib, ESRD on dialysis, GERD, hx of DVT, DM2, breast CA, seizure, opioid dependence, CHF, CAD, pleural effusion, HLD, gout, dysphagia, acute on chronic resp failure, enceophalopathy, OA of b/l hips, chronic LB pain, dependence on respirator status, HTN  Patient with active OT orders and activity orders for Activity as tolerated  Personal factors affecting pt at time of IE include: difficulty performing ADLs and IADLs, difficulty with functional mobility/transfers  Prior to admission, pt requires assistance for ADLs/IADLs  Upon evaluation, patient’s functional status as follows: eating: SBA, grooming: SBA, UB bathing: MIN A , LB bathing: MOD A, UB dressing: MIN A , LB dressing: MAX A, toileting: MOD A; functional transfers: SBA, bed mobility: SBA, functional mobility: SBA, sitting/standing tolerance: ~2 min with b/l UE support- due to the following deficits impacting occupational performance: decreased B UE strength, decreased static/dynamic sitting/standing balance, decreased activity tolerance, decreased safety awareness, and increased pain  Patient would benefit from continued skilled OT therapy while in acute setting to address deficits as defined above and maximize (I) with ADLs and functional mobility  Occupational performance areas to address include: grooming, bathing, toileting, UB/LB dressing, functional mobility, household maintenance, and medication management   Based on the aforementioned OT evaluation, functional performance deficits, and assessments, pt has been identified as a high complexity evaluation      The patient's raw score on the AM-PAC Daily Activity Inpatient Short Form is 17  A raw score of less than 19 suggests the patient may benefit from discharge to home with [facility] and increased family support  Please refer to the recommendation of the Occupational Therapist for safe discharge planning  Goals   LTG Time Frame 10-14   Plan   Treatment Interventions ADL retraining;Functional transfer training;UE strengthening/ROM; Endurance training;Patient/family training;Equipment evaluation/education; Neuromuscular reeducation; Compensatory technique education;Continued evaluation; Energy conservation; Activityengagement   Goal Expiration Date 03/25/23   OT Treatment Day 0   OT Frequency 3-5x/wk   Recommendation   OT Discharge Recommendation Home with home health rehabilitation   [facility] Daily Activity Inpatient   Lower Body Dressing 2   Bathing 2   Toileting 3   Upper Body Dressing 3   Grooming 3   Eating 4   Daily Activity Raw Score 17   Daily Activity Standardized Score (Calc for Raw Score >=11) 37 26     Occupational Therapy goals: In 7-14 days:     1- Patient will verbalize and demonstrate use of energy conservation/deep breathing technique and work simplification skills during functional activity with no verbal cues  2- Patient will verbalize and demonstrate good body mechanics and joint protection techniques during ADLs/IADLs with no verbal cues     3- Pt will complete bed mobility at a Mod I level w/ G balance/safety demonstrated to decrease caregiver assistance required     4- Patient will increase OOB/ sitting tolerance to 2-4 hours per day for increased participation in self care and leisure tasks with no s/s of exertion       5-Patient will increase standing tolerance time to 5 minutes with unilateral UE support to complete sink level ADLs@ mod I level      5- Patient will increase sitting tolerance at edge of bed to 20 minutes to complete UB ADLs @ set up assist level       6- Pt will improve functional transfers to S on/off all surfaces using DME as needed w/ G balance/safety     7- Patient will complete UB ADLs with S utilizing appropriate DME/AE PRN     8- Patient will complete LB ADLs with Jessica utilizing appropriate DME/AE PRN     9- Patient will complete toileting hygiene with Jessica with G hygiene/thoroughness utilizing appropriate DME/AE PRN     10- Pt will improve functional mobility during ADL/IADL/leisure tasks to [place] using DME as needed w/ G balance/safety      11- Pt will be attentive 100% of the time during ongoing cognitive assessment w/ G participation to assist w/ safe d/c planning/recommendations     12- Pt will increase BUE strength by 1MM grade via AROM/AAROM/PROM exercises to increase independence in ADLs and transfers         Veronica Shankar OTR/L

## 2023-03-11 NOTE — PLAN OF CARE
Problem: OCCUPATIONAL THERAPY ADULT  Goal: Performs self-care activities at highest level of function for planned discharge setting  See evaluation for individualized goals  Description: Treatment Interventions: ADL retraining, Functional transfer training, UE strengthening/ROM, Endurance training, Patient/family training, Equipment evaluation/education, Neuromuscular reeducation, Compensatory technique education, Continued evaluation, Energy conservation, Activityengagement          See flowsheet documentation for full assessment, interventions and recommendations  Note: Limitation: Decreased ADL status, Decreased UE strength, Decreased Safe judgement during ADL, Decreased endurance, Decreased high-level ADLs, Decreased self-care trans  Prognosis: Fair  Assessment: Patient is a 82y o  year old female seen for OT eval s/p admit to [facility] on 3/9/2023 with periappendiceal abscess  Comorbidities affecting pt’s functional performance include a significant PMH of: a-fib, ESRD on dialysis, GERD, hx of DVT, DM2, breast CA, seizure, opioid dependence, CHF, CAD, pleural effusion, HLD, gout, dysphagia, acute on chronic resp failure, enceophalopathy, OA of b/l hips, chronic LB pain, dependence on respirator status, HTN  Patient with active OT orders and activity orders for Activity as tolerated  Personal factors affecting pt at time of IE include: difficulty performing ADLs and IADLs, difficulty with functional mobility/transfers  Prior to admission, pt requires assistance for ADLs/IADLs   Upon evaluation, patient’s functional status as follows: eating: SBA, grooming: SBA, UB bathing: MIN A , LB bathing: MOD A, UB dressing: MIN A , LB dressing: MAX A, toileting: MOD A; functional transfers: SBA, bed mobility: SBA, functional mobility: SBA, sitting/standing tolerance: ~2 min with b/l UE support- due to the following deficits impacting occupational performance: decreased B UE strength, decreased static/dynamic sitting/standing balance, decreased activity tolerance, decreased safety awareness, and increased pain  Patient would benefit from continued skilled OT therapy while in acute setting to address deficits as defined above and maximize (I) with ADLs and functional mobility  Occupational performance areas to address include: grooming, bathing, toileting, UB/LB dressing, functional mobility, household maintenance, and medication management  Based on the aforementioned OT evaluation, functional performance deficits, and assessments, pt has been identified as a high complexity evaluation      The patient's raw score on the AM-PAC Daily Activity Inpatient Short Form is 17  A raw score of less than 19 suggests the patient may benefit from discharge to home with [facility] and increased family support  Please refer to the recommendation of the Occupational Therapist for safe discharge planning       OT Discharge Recommendation: Home with home health rehabilitation

## 2023-03-11 NOTE — PLAN OF CARE
Problem: MOBILITY - ADULT  Goal: Maintain or return to baseline ADL function  Description: INTERVENTIONS:  -  Assess patient's ability to carry out ADLs; assess patient's baseline for ADL function and identify physical deficits which impact ability to perform ADLs (bathing, care of mouth/teeth, toileting, grooming, dressing, etc )  - Assess/evaluate cause of self-care deficits   - Assess range of motion  - Assess patient's mobility; develop plan if impaired  - Assess patient's need for assistive devices and provide as appropriate  - Encourage maximum independence but intervene and supervise when necessary  - Involve family in performance of ADLs  - Assess for home care needs following discharge   - Consider OT consult to assist with ADL evaluation and planning for discharge  - Provide patient education as appropriate  Outcome: Progressing  Goal: Maintains/Returns to pre admission functional level  Description: INTERVENTIONS:  - Perform BMAT or MOVE assessment daily    - Set and communicate daily mobility goal to care team and patient/family/caregiver     - Collaborate with rehabilitation services on mobility goals if consulted  - Perform Range of Motion   - Out of bed for toileting  - Record patient progress and toleration of activity level   Outcome: Progressing     Problem: PAIN - ADULT  Goal: Verbalizes/displays adequate comfort level or baseline comfort level  Description: Interventions:  - Encourage patient to monitor pain and request assistance  - Assess pain using appropriate pain scale  - Administer analgesics based on type and severity of pain and evaluate response  - Implement non-pharmacological measures as appropriate and evaluate response  - Consider cultural and social influences on pain and pain management  - Notify physician/advanced practitioner if interventions unsuccessful or patient reports new pain  Outcome: Progressing     Problem: INFECTION - ADULT  Goal: Absence or prevention of progression during hospitalization  Description: INTERVENTIONS:  - Assess and monitor for signs and symptoms of infection  - Monitor lab/diagnostic results  - Monitor all insertion sites, i e  indwelling lines, tubes, and drains  - Monitor endotracheal if appropriate and nasal secretions for changes in amount and color  - [place] appropriate cooling/warming therapies per order  - Administer medications as ordered  - Instruct and encourage patient and family to use good hand hygiene technique  - Identify and instruct in appropriate isolation precautions for identified infection/condition  Outcome: Progressing  Goal: Absence of fever/infection during neutropenic period  Description: INTERVENTIONS:  - Monitor WBC    Outcome: Progressing     Problem: SAFETY ADULT  Goal: Maintain or return to baseline ADL function  Description: INTERVENTIONS:  -  Assess patient's ability to carry out ADLs; assess patient's baseline for ADL function and identify physical deficits which impact ability to perform ADLs (bathing, care of mouth/teeth, toileting, grooming, dressing, etc )  - Assess/evaluate cause of self-care deficits   - Assess range of motion  - Assess patient's mobility; develop plan if impaired  - Assess patient's need for assistive devices and provide as appropriate  - Encourage maximum independence but intervene and supervise when necessary  - Involve family in performance of ADLs  - Assess for home care needs following discharge   - Consider OT consult to assist with ADL evaluation and planning for discharge  - Provide patient education as appropriate  Outcome: Progressing  Goal: Maintains/Returns to pre admission functional level  Description: INTERVENTIONS:  - Perform BMAT or MOVE assessment daily    - Set and communicate daily mobility goal to care team and patient/family/caregiver     - Collaborate with rehabilitation services on mobility goals if consulted  - Perform Range of Motion   - Out of bed for toileting  - Record patient progress and toleration of activity level   Outcome: Progressing  Goal: Patient will remain free of falls  Description: INTERVENTIONS:  - Educate patient/family on patient safety including physical limitations  - Instruct patient to call for assistance with activity   - Consult OT/PT to assist with strengthening/mobility   - Keep Call bell within reach  - Keep bed low and locked with side rails adjusted as appropriate  - Keep care items and personal belongings within reach  - Initiate and maintain comfort rounds  - Make Fall Risk Sign visible to staff  - Offer Toileting   - Apply yellow socks and bracelet for high fall risk patients  - Consider moving patient to room near nurses station  Outcome: Progressing     Problem: DISCHARGE PLANNING  Goal: Discharge to home or other facility with appropriate resources  Description: INTERVENTIONS:  - Identify barriers to discharge w/patient and caregiver  - Arrange for needed discharge resources and transportation as appropriate  - Identify discharge learning needs (meds, wound care, etc )  - Arrange for interpretive services to assist at discharge as needed  - Refer to Case Management Department for coordinating discharge planning if the patient needs post-hospital services based on physician/advanced practitioner order or complex needs related to functional status, cognitive ability, or social support system  Outcome: Progressing     Problem: Knowledge Deficit  Goal: Patient/family/caregiver demonstrates understanding of disease process, treatment plan, medications, and discharge instructions  Description: Complete learning assessment and assess knowledge base    Interventions:  - Provide teaching at level of understanding  - Provide teaching via preferred learning methods  Outcome: Progressing     Problem: Prexisting or High Potential for Compromised Skin Integrity  Goal: Skin integrity is maintained or improved  Description: INTERVENTIONS:  - Identify patients at risk for skin breakdown  - Assess and monitor skin integrity  - Assess and monitor nutrition and hydration status  - Monitor labs   - Assess for incontinence   - Turn and reposition patient  - Assist with mobility/ambulation  - Relieve pressure over bony prominences  - Avoid friction and shearing  - Provide appropriate hygiene as needed including keeping skin clean and dry  - Evaluate need for skin moisturizer/barrier cream  - Collaborate with interdisciplinary team   - Patient/family teaching  - Consider wound care consult   Outcome: Progressing     Problem: Nutrition/Hydration-ADULT  Goal: Nutrient/Hydration intake appropriate for improving, restoring or maintaining nutritional needs  Description: Monitor and assess patient's nutrition/hydration status for malnutrition  Collaborate with interdisciplinary team and initiate plan and interventions as ordered  Monitor patient's weight and dietary intake as ordered or per policy  Utilize nutrition screening tool and intervene as necessary  Determine patient's food preferences and provide high-protein, high-caloric foods as appropriate       INTERVENTIONS:  - Monitor oral intake, urinary output, labs, and treatment plans  - Assess nutrition and hydration status and recommend course of action  - Evaluate amount of meals eaten  - Assist patient with eating if necessary   - Allow adequate time for meals  - Recommend/ encourage appropriate diets, oral nutritional supplements, and vitamin/mineral supplements  - Order, calculate, and assess calorie counts as needed  - Recommend, monitor, and adjust tube feedings and TPN/PPN based on assessed needs  - Assess need for intravenous fluids  - Provide specific nutrition/hydration education as appropriate  - Include patient/family/caregiver in decisions related to nutrition  Outcome: Progressing     Problem: METABOLIC, FLUID AND ELECTROLYTES - ADULT  Goal: Electrolytes maintained within normal limits  Description: INTERVENTIONS:  - Monitor labs and assess patient for signs and symptoms of electrolyte imbalances  - Administer electrolyte replacement as ordered  - Monitor response to electrolyte replacements, including repeat lab results as appropriate  - Instruct patient on fluid and nutrition as appropriate  Outcome: Progressing  Goal: Fluid balance maintained  Description: INTERVENTIONS:  - Monitor labs   - Monitor I/O and WT  - Instruct patient on fluid and nutrition as appropriate  - Assess for signs & symptoms of volume excess or deficit  Outcome: Progressing

## 2023-03-11 NOTE — PROGRESS NOTES
NEPHROLOGY PROGRESS NOTE   Jimi Breath 82 y o  female MRN: [id number]  Unit/Bed#: [place] 68 2 -01 Encounter: [id number]      ASSESSMENT & PLAN:  #1 ESRD on HD MWF at [facility]  Last dialysis treatment yesterday  Next dialysis treatment on Monday following outpatient schedule  2   Perforated appendix with 3 cm appendiceal abscess, general surgery and infectious disease on board  Antibiotics as per ID, renal adjust dose if needed    For repeat CAT scan on 3/12    3 hemodynamics, blood pressure is stable, history of chronic hypotension on midodrine    4 anemia of chronic kidney disease, monitor H&H and transfuse for hemoglobin less than 7    #5 Access, PermCath in place    My plan and recommendation were discussed with internal medicine attending, he agreed with the plan for next dialysis treatment on Monday    SUBJECTIVE:  Patient seen and examined, continue with abdominal pain on and off, denies any nausea or vomiting, no chest pain, no shortness of breath, no issue with dialysis yesterday  Patient's daughter at bedside    OBJECTIVE:  Current Weight: Weight - Scale: 60 8 kg (134 lb 0 6 oz)  Vitals:    03/11/23 0804   BP: 117/56   Pulse: 58   Resp: 18   Temp: 98 8 °F (37 1 °C)   SpO2: 95%       Intake/Output Summary (Last 24 hours) at 3/11/2023 0908  Last data filed at 3/11/2023 0747  Gross per 24 hour   Intake 750 ml   Output 1005 ml   Net -255 ml       General: conscious, cooperative, in not acute distress  Eyes: conjunctivae pale, anicteric sclerae  ENT: lips and mucous membranes moist  Neck: supple, no JVD  Chest: clear breath sounds bilateral, no crackles, ronchus or wheezings  CVS: distinct S1 & S2, normal rate, regular rhythm  Abdomen: soft, mildly-tender, non-distended, normoactive bowel sounds  Extremities: no edema of both legs  Skin: no rash  Neuro: awake, alert, oriented  PermCath in place        Medications:    Current Facility-Administered Medications:   •  acetaminophen (TYLENOL) tablet 650 mg, 650 mg, Oral, Q6H PRN, LAZARA GlynnC  •  aluminum-magnesium hydroxide-simethicone (MYLANTA) oral suspension 30 mL, 30 mL, Oral, Q6H PRN, LAZARA GlynnC  •  atorvastatin (LIPITOR) tablet 40 mg, 40 mg, Oral, QPM, IRVIN Glynn-C, 40 mg at 03/10/23 1748  •  famotidine (PEPCID) tablet 20 mg, 20 mg, Oral, BID PRN, LAZARA GlynnC  •  HYDROmorphone (DILAUDID) injection 2 mg, 2 mg, Intravenous, Q4H PRN, LAZARA GlynnC, 2 mg at 03/09/23 2008  •  letrozole [facility]) tablet 2 5 mg, 2 5 mg, Oral, Daily, ALZARA GlynnC, 2 5 mg at 03/11/23 9003  •  levETIRAcetam (KEPPRA) oral solution 250 mg, 250 mg, Oral, Daily PRN, LAZARA GlynnC  •  levETIRAcetam (KEPPRA) oral solution 500 mg, 500 mg, Oral, Q12H [facility], Vipul Roy PA-C, 500 mg at 03/11/23 3009  •  LORazepam (ATIVAN) tablet 0 5 mg, 0 5 mg, Oral, Q8H PRN, LAZARA GlynnC  •  lubiprostone (AMITIZA) capsule 24 mcg, 24 mcg, Oral, BID With Meals, LAZARA GlynnC, 24 mcg at 03/09/23 1711  •  metoprolol succinate (TOPROL-XL) 24 hr tablet 25 mg, 25 mg, Oral, BID, IRVIN Glynn-C, 25 mg at 03/11/23 2194  •  midodrine (PROAMATINE) tablet 5 mg, 5 mg, Oral, TID AC, Dina Levine MD, 5 mg at 03/11/23 9955  •  ondansetron [facility]) injection 4 mg, 4 mg, Intravenous, Q6H PRN, Vipul Roy PA-C, 4 mg at 03/09/23 1938  •  pantoprazole (PROTONIX) EC tablet 20 mg, 20 mg, Oral, Early Morning, Vipul Roy PA-C, 20 mg at 03/11/23 0549  •  piperacillin-tazobactam (ZOSYN) 2 25 g in sodium chloride 0 9 % 50 mL IVPB, 2 25 g, Intravenous, Q8H, Vipul Roy PA-C, Stopped at 03/11/23 7889  •  saccharomyces boulardii (FLORASTOR) capsule 250 mg, 250 mg, Oral, BID, Osiris Pedraza PA-C, 250 mg at 03/11/23 5276  •  simethicone (MYLICON) chewable tablet 80 mg, 80 mg, Oral, Daily, Vipul Roy PA-C, 80 mg at 03/11/23 3552  •  warfarin (COUMADIN) tablet 5 mg, 5 mg, Oral, Daily (warfarin), Anthony Rosa DO, 5 mg at 03/10/23 1748    Invasive Devices:        Lab Results:   Results from last 7 days   Lab Units 03/11/23  0632 03/10/23  0600 03/09/23  0209   WBC Thousand/uL 5 02 7 46 10 53*   HEMOGLOBIN g/dL 8 9* 9 1* 10 8*   HEMATOCRIT % 28 4* 28 9* 34 1*   PLATELETS Thousands/uL 189 190 212   SODIUM mmol/L 144 138 139   POTASSIUM mmol/L 3 8 4 1 4 6   CHLORIDE mmol/L 105 101 99   CO2 mmol/L 28 30 31   BUN mg/dL 7 22 12   CREATININE mg/dL 2 36* 4 24* 2 67*   CALCIUM mg/dL 8 2* 8 6 9 6   MAGNESIUM mg/dL  --  1 7*  --    PHOSPHORUS mg/dL  --  3 4  --    ALK PHOS U/L  --   --  138*   ALT U/L  --   --  8   AST U/L  --   --  11*       Previous work up:  See previous notes      Portions of the record may have been created with voice recognition software  Occasional wrong word or "sound a like" substitutions may have occurred due to the inherent limitations of voice recognition software  Read the chart carefully and recognize, using context, where substitutions have occurred  If you have any questions, please contact the dictating provider

## 2023-03-12 ENCOUNTER — APPOINTMENT (INPATIENT)
Dept: CT IMAGING | Facility: HOSPITAL | Age: 83
End: 2023-03-12

## 2023-03-12 RX ORDER — WARFARIN SODIUM 5 MG/1
5 TABLET ORAL
Status: DISCONTINUED | OUTPATIENT
Start: 2023-03-12 | End: 2023-03-13

## 2023-03-12 RX ADMIN — MIDODRINE HYDROCHLORIDE 5 MG: 5 TABLET ORAL at 10:51

## 2023-03-12 RX ADMIN — PANTOPRAZOLE SODIUM 20 MG: 20 TABLET, DELAYED RELEASE ORAL at 06:27

## 2023-03-12 RX ADMIN — PIPERACILLIN AND TAZOBACTAM 2.25 G: 2; .25 INJECTION, POWDER, LYOPHILIZED, FOR SOLUTION INTRAVENOUS at 06:27

## 2023-03-12 RX ADMIN — Medication 250 MG: at 09:08

## 2023-03-12 RX ADMIN — PIPERACILLIN AND TAZOBACTAM 2.25 G: 2; .25 INJECTION, POWDER, LYOPHILIZED, FOR SOLUTION INTRAVENOUS at 20:44

## 2023-03-12 RX ADMIN — LETROZOLE 2.5 MG: 2.5 TABLET, FILM COATED ORAL at 09:02

## 2023-03-12 RX ADMIN — WARFARIN SODIUM 5 MG: 5 TABLET ORAL at 20:44

## 2023-03-12 RX ADMIN — MIDODRINE HYDROCHLORIDE 5 MG: 5 TABLET ORAL at 20:44

## 2023-03-12 RX ADMIN — LUBIPROSTONE 24 MCG: 24 CAPSULE, GELATIN COATED ORAL at 09:00

## 2023-03-12 RX ADMIN — LEVETIRACETAM 500 MG: 100 SOLUTION ORAL at 09:02

## 2023-03-12 RX ADMIN — SIMETHICONE 80 MG: 80 TABLET, CHEWABLE ORAL at 09:02

## 2023-03-12 RX ADMIN — PIPERACILLIN AND TAZOBACTAM 2.25 G: 2; .25 INJECTION, POWDER, LYOPHILIZED, FOR SOLUTION INTRAVENOUS at 15:27

## 2023-03-12 RX ADMIN — MIDODRINE HYDROCHLORIDE 5 MG: 5 TABLET ORAL at 09:00

## 2023-03-12 RX ADMIN — LEVETIRACETAM 500 MG: 100 SOLUTION ORAL at 20:44

## 2023-03-12 RX ADMIN — IOHEXOL 30 ML: 300 INJECTION, SOLUTION INTRAVENOUS at 15:19

## 2023-03-12 RX ADMIN — SODIUM CHLORIDE 500 ML: 0.9 INJECTION, SOLUTION INTRAVENOUS at 04:00

## 2023-03-12 NOTE — NURSING NOTE
Education given to the patient on the purpose and the need to drink the contrast dye  Intake of the contrast dye by the patient is 620 mL

## 2023-03-12 NOTE — PROGRESS NOTES
NEPHROLOGY PROGRESS NOTE   Rene Vallejo 82 y o  female MRN: [id number]  Unit/Bed#: [place] [address] -01 Encounter: [id number]      ASSESSMENT & PLAN:  #1 ESRD on HD MWF at [place] [address] for hemodialysis treatment tomorrow following outpatient schedule  Cautious with IV fluids given patient is ESRD  2   Perforated appendix with 3 cm appendiceal abscess, general surgery and infectious disease on board  Antibiotics as per ID, renal adjust dose if needed  For repeat CAT scan today    3 hemodynamics, blood pressure is stable, history of chronic hypotension on midodrine    4 anemia of chronic kidney disease, monitor H&H and transfuse for hemoglobin less than 7    #5 Access, PermCath in place      SUBJECTIVE:  Seen and examined, daughter at bedside    Continue with abdominal pain when seen, denies any chest pain, no shortness of breath, no nausea, no vomiting, tolerating diet    OBJECTIVE:  Current Weight: Weight - Scale: 65 8 kg (145 lb 1 oz)  Vitals:    03/12/23 0740   BP: (!) 101/39   Pulse: (!) 53   Resp: 12   Temp: 97 9 °F (36 6 °C)   SpO2: 96%     No intake or output data in the 24 hours ending 03/12/23 0900    General: conscious, cooperative, in not acute distress  Eyes: conjunctivae pink, anicteric sclerae  ENT: lips and mucous membranes moist  Neck: supple, no JVD  Chest: clear breath sounds bilateral, no crackles, ronchus or wheezings  CVS: distinct S1 & S2, normal rate, regular rhythm  Abdomen: soft, mildly-tender, non-distended, normoactive bowel sounds  Extremities: no edema of both legs  Skin: no rash  Neuro: awake, alert, oriented  PermCath in place        Medications:    Current Facility-Administered Medications:   •  acetaminophen (TYLENOL) tablet 650 mg, 650 mg, Oral, Q6H PRN, Rogelio Slater PA-C  •  aluminum-magnesium hydroxide-simethicone (MYLANTA) oral suspension 30 mL, 30 mL, Oral, Q6H PRN, Rogelio Slater PA-C  •  atorvastatin (LIPITOR) tablet 40 mg, 40 mg, Oral, QPM, Rogelio Slater PA-C, 40 mg at 03/11/23 1722  •  famotidine (PEPCID) tablet 20 mg, 20 mg, Oral, BID PRN, Shay Liu PA-C  •  HYDROmorphone (DILAUDID) injection 2 mg, 2 mg, Intravenous, Q4H PRN, Shay Liu PA-C, 2 mg at 03/09/23 2008  •  letrozole [facility]) tablet 2 5 mg, 2 5 mg, Oral, Daily, Shay Liu PA-C, 2 5 mg at 03/11/23 8169  •  levETIRAcetam (KEPPRA) oral solution 250 mg, 250 mg, Oral, Daily PRN, Shay Liu PA-C  •  levETIRAcetam (KEPPRA) oral solution 500 mg, 500 mg, Oral, Q12H [facility], Shay Liu PA-C, 500 mg at 03/11/23 2059  •  LORazepam (ATIVAN) tablet 0 5 mg, 0 5 mg, Oral, Q8H PRN, Shay Liu PA-C  •  lubiprostone (AMITIZA) capsule 24 mcg, 24 mcg, Oral, BID With Meals, Shay iLu PA-C, 24 mcg at 03/09/23 1711  •  metoprolol succinate (TOPROL-XL) 24 hr tablet 25 mg, 25 mg, Oral, BID, Shay Liu PA-C, 25 mg at 03/11/23 2206  •  midodrine (PROAMATINE) tablet 5 mg, 5 mg, Oral, TID AC, Huber Carney MD, 5 mg at 03/11/23 1722  •  [facility]) injection 4 mg, 4 mg, Intravenous, Q6H PRN, Shay Liu PA-C, 4 mg at 03/09/23 1938  •  pantoprazole (PROTONIX) EC tablet 20 mg, 20 mg, Oral, Early Morning, Shay Liu PA-C, 20 mg at 03/12/23 3372  •  piperacillin-tazobactam (ZOSYN) 2 25 g in sodium chloride 0 9 % 50 mL IVPB, 2 25 g, Intravenous, Q8H, Carlean Liu, PA-C, Last Rate: 100 mL/hr at 03/12/23 0627, 2 25 g at 03/12/23 3452  •  saccharomyces boulardii (FLORASTOR) capsule 250 mg, 250 mg, Oral, BID, Osiris Pedraza PA-C, 250 mg at 03/11/23 1722  •  simethicone (MYLICON) chewable tablet 80 mg, 80 mg, Oral, Daily, Shay Liu PA-C, 80 mg at 03/11/23 8327    Invasive Devices:        Lab Results:   Results from last 7 days   Lab Units 03/11/23  1102 03/11/23  0632 03/10/23  0600 03/09/23  0209   WBC Thousand/uL 6 59 5 02 7 46 10 53*   HEMOGLOBIN g/dL 9 4* 8 9* 9 1* 10 8*   HEMATOCRIT % 31 2* 28 4* 28 9* 34 1*   PLATELETS Thousands/uL 215 189 190 212   SODIUM mmol/L  --  144 138 139   POTASSIUM mmol/L  --  3 8 4 1 4 6   CHLORIDE mmol/L  --  105 101 99   CO2 mmol/L  --  28 30 31   BUN mg/dL  --  7 22 12   CREATININE mg/dL  --  2 36* 4 24* 2 67*   CALCIUM mg/dL  --  8 2* 8 6 9 6   MAGNESIUM mg/dL  --   --  1 7*  --    PHOSPHORUS mg/dL  --   --  3 4  --    ALK PHOS U/L  --   --   --  138*   ALT U/L  --   --   --  8   AST U/L  --   --   --  11*       Previous work up:  See previous notes      Portions of the record may have been created with voice recognition software  Occasional wrong word or "sound a like" substitutions may have occurred due to the inherent limitations of voice recognition software  Read the chart carefully and recognize, using context, where substitutions have occurred  If you have any questions, please contact the dictating provider

## 2023-03-12 NOTE — ASSESSMENT & PLAN NOTE
· Patient presented to the ED with her daughter following 3 weeks of post-prandial stomach pain and occasional vomiting  Daughter reports patient had appointment with GI soon, but for the last two days, the patient has not been able to tolerate any oral intake and so not able to keep down medications  · Patient reports after eating pain 10/10 epigastric pain that radiates into right lower quadrant area  · CT abdomen/pelvis demonstrating "3 4 cm rim-enhancing gas and fluid collection at the right lower quadrant adjacent to the appendiceal tip presumed to represent a periappendiceal abscess  Extensive mesenteric vessel disease as detailed above which is not significantly changed from prior exams "    Patient seen by surgery  [place] high risk for surgery, recommend IR drainage or medical treatment with IV abx  IR feels it is not amenable to drainage at the time of admission    Patient is on empiric Zosyn    Surgery is planning on repeating CT abd today  If the abscess is bigger, IR may be able to drain it      Coumadin is on hold in case she needs a procedure

## 2023-03-12 NOTE — PLAN OF CARE
Problem: MOBILITY - ADULT  Goal: Maintain or return to baseline ADL function  Description: INTERVENTIONS:  -  Assess patient's ability to carry out ADLs; assess patient's baseline for ADL function and identify physical deficits which impact ability to perform ADLs (bathing, care of mouth/teeth, toileting, grooming, dressing, etc )  - Assess/evaluate cause of self-care deficits   - Assess range of motion  - Assess patient's mobility; develop plan if impaired  - Assess patient's need for assistive devices and provide as appropriate  - Encourage maximum independence but intervene and supervise when necessary  - Involve family in performance of ADLs  - Assess for home care needs following discharge   - Consider OT consult to assist with ADL evaluation and planning for discharge  - Provide patient education as appropriate  Outcome: Progressing  Goal: Maintains/Returns to pre admission functional level  Description: INTERVENTIONS:  - Perform BMAT or MOVE assessment daily    - Set and communicate daily mobility goal to care team and patient/family/caregiver  - Collaborate with rehabilitation services on mobility goals if consulted  - Perform Range of Motion 2 times a day  - Reposition patient every 2 hours    - Dangle patient 2 times a day  - Stand patient 2 times a day  - Ambulate patient 3 times a day  - Out of bed to chair 3 times a day   - Out of bed for meals 3 times a day  - Out of bed for toileting  - Record patient progress and toleration of activity level   Outcome: Progressing     Problem: PAIN - ADULT  Goal: Verbalizes/displays adequate comfort level or baseline comfort level  Description: Interventions:  - Encourage patient to monitor pain and request assistance  - Assess pain using appropriate pain scale  - Administer analgesics based on type and severity of pain and evaluate response  - Implement non-pharmacological measures as appropriate and evaluate response  - Consider cultural and social influences on pain and pain management  - Notify physician/advanced practitioner if interventions unsuccessful or patient reports new pain  Outcome: Progressing     Problem: INFECTION - ADULT  Goal: Absence or prevention of progression during hospitalization  Description: INTERVENTIONS:  - Assess and monitor for signs and symptoms of infection  - Monitor lab/diagnostic results  - Monitor all insertion sites, i e  indwelling lines, tubes, and drains  - Monitor endotracheal if appropriate and nasal secretions for changes in amount and color  - [place] appropriate cooling/warming therapies per order  - Administer medications as ordered  - Instruct and encourage patient and family to use good hand hygiene technique  - Identify and instruct in appropriate isolation precautions for identified infection/condition  Outcome: Progressing  Goal: Absence of fever/infection during neutropenic period  Description: INTERVENTIONS:  - Monitor WBC    Outcome: Progressing     Problem: SAFETY ADULT  Goal: Maintain or return to baseline ADL function  Description: INTERVENTIONS:  -  Assess patient's ability to carry out ADLs; assess patient's baseline for ADL function and identify physical deficits which impact ability to perform ADLs (bathing, care of mouth/teeth, toileting, grooming, dressing, etc )  - Assess/evaluate cause of self-care deficits   - Assess range of motion  - Assess patient's mobility; develop plan if impaired  - Assess patient's need for assistive devices and provide as appropriate  - Encourage maximum independence but intervene and supervise when necessary  - Involve family in performance of ADLs  - Assess for home care needs following discharge   - Consider OT consult to assist with ADL evaluation and planning for discharge  - Provide patient education as appropriate  Outcome: Progressing  Goal: Maintains/Returns to pre admission functional level  Description: INTERVENTIONS:  - Perform BMAT or MOVE assessment daily    - Set and communicate daily mobility goal to care team and patient/family/caregiver  - Collaborate with rehabilitation services on mobility goals if consulted  - Perform Range of Motion 2 times a day  - Reposition patient every 2 hours    - Dangle patient 2 times a day  - Stand patient 2 times a day  - Ambulate patient 3 times a day  - Out of bed to chair 3 times a day   - Out of bed for meals 3 times a day  - Out of bed for toileting  - Record patient progress and toleration of activity level   Outcome: Progressing  Goal: Patient will remain free of falls  Description: INTERVENTIONS:  - Educate patient/family on patient safety including physical limitations  - Instruct patient to call for assistance with activity   - Consult OT/PT to assist with strengthening/mobility   - Keep Call bell within reach  - Keep bed low and locked with side rails adjusted as appropriate  - Keep care items and personal belongings within reach  - Initiate and maintain comfort rounds  - Make Fall Risk Sign visible to staff  - Apply yellow socks and bracelet for high fall risk patients  - Consider moving patient to room near nurses station  Outcome: Progressing     Problem: DISCHARGE PLANNING  Goal: Discharge to home or other facility with appropriate resources  Description: INTERVENTIONS:  - Identify barriers to discharge w/patient and caregiver  - Arrange for needed discharge resources and transportation as appropriate  - Identify discharge learning needs (meds, wound care, etc )  - Arrange for interpretive services to assist at discharge as needed  - Refer to Case Management Department for coordinating discharge planning if the patient needs post-hospital services based on physician/advanced practitioner order or complex needs related to functional status, cognitive ability, or social support system  Outcome: Progressing     Problem: Knowledge Deficit  Goal: Patient/family/caregiver demonstrates understanding of disease process, treatment plan, medications, and discharge instructions  Description: Complete learning assessment and assess knowledge base  Interventions:  - Provide teaching at level of understanding  - Provide teaching via preferred learning methods  Outcome: Progressing     Problem: Prexisting or High Potential for Compromised Skin Integrity  Goal: Skin integrity is maintained or improved  Description: INTERVENTIONS:  - Identify patients at risk for skin breakdown  - Assess and monitor skin integrity  - Assess and monitor nutrition and hydration status  - Monitor labs   - Assess for incontinence   - Turn and reposition patient  - Assist with mobility/ambulation  - Relieve pressure over bony prominences  - Avoid friction and shearing  - Provide appropriate hygiene as needed including keeping skin clean and dry  - Evaluate need for skin moisturizer/barrier cream  - Collaborate with interdisciplinary team   - Patient/family teaching  - Consider wound care consult   Outcome: Progressing     Problem: Nutrition/Hydration-ADULT  Goal: Nutrient/Hydration intake appropriate for improving, restoring or maintaining nutritional needs  Description: Monitor and assess patient's nutrition/hydration status for malnutrition  Collaborate with interdisciplinary team and initiate plan and interventions as ordered  Monitor patient's weight and dietary intake as ordered or per policy  Utilize nutrition screening tool and intervene as necessary  Determine patient's food preferences and provide high-protein, high-caloric foods as appropriate       INTERVENTIONS:  - Monitor oral intake, urinary output, labs, and treatment plans  - Assess nutrition and hydration status and recommend course of action  - Evaluate amount of meals eaten  - Assist patient with eating if necessary   - Allow adequate time for meals  - Recommend/ encourage appropriate diets, oral nutritional supplements, and vitamin/mineral supplements  - Order, calculate, and assess calorie counts as needed  - Recommend, monitor, and adjust tube feedings and TPN/PPN based on assessed needs  - Assess need for intravenous fluids  - Provide specific nutrition/hydration education as appropriate  - Include patient/family/caregiver in decisions related to nutrition  Outcome: Progressing     Problem: METABOLIC, FLUID AND ELECTROLYTES - ADULT  Goal: Electrolytes maintained within normal limits  Description: INTERVENTIONS:  - Monitor labs and assess patient for signs and symptoms of electrolyte imbalances  - Administer electrolyte replacement as ordered  - Monitor response to electrolyte replacements, including repeat lab results as appropriate  - Instruct patient on fluid and nutrition as appropriate  Outcome: Progressing  Goal: Fluid balance maintained  Description: INTERVENTIONS:  - Monitor labs   - Monitor I/O and WT  - Instruct patient on fluid and nutrition as appropriate  - Assess for signs & symptoms of volume excess or deficit  Outcome: Progressing

## 2023-03-12 NOTE — PROGRESS NOTES
Clarification    Coagulopthy due to warfarin, in setting of Chronic Atrial Fibrillation, evidenced by warfarin use, treated with daily INR labs, possible reversal with FFP to facilitate INR normalization for IR drain placement

## 2023-03-12 NOTE — TREATMENT PLAN
CT imaging reviewed  Significantly decreased size of periappendiceal abscess  Afebrile  WBC normal  Patient is tolerating a diet  Remains tender focally, but improved  No pain at rest  Endorsing diarrhea  Temp:  [97 8 °F (36 6 °C)-98 3 °F (36 8 °C)] 97 8 °F (36 6 °C)  HR:  [45-57] 50  Resp:  [12-22] 18  BP: ()/(39-43) 144/42    Physical Exam:  General: No acute distress, alert  CV: Well perfused, regular rate  Lungs: Normal work of breathing, no increased respiratory effort  Abdomen: Soft, focally tender in RLQ, non-distended  Extremities: No  clubbing or cyanosis  Skin: Warm, dry    Imaging:  CT abdomen pelvis wo contrast    Result Date: 3/12/2023  Impression: 1  Significantly decreased size of a fluid and gas collection in the right lower quadrant adjacent to the appendiceal tip and several loops of small bowel  No new collections  2   New/worsened pleural effusions  Workstation performed: [id number]     Plan:  - Diet as tolerated  - Recommend transitioning to Augmentin on discharge to complete 10 day course  - Received a dose of lubiprostone this morning  Will discontinue in the setting of diarrhea  Discussed checking for C Diff but will hold off in the setting of normal WBC, recent lubiprostone administration, and administration of PO contrast today   - Okay to restart anticoagulation with Warfarin  - Outpatient follow up with general surgery  - Okay for discharge from general surgery perspective  Please reach out with any questions or concerns       Suresh Hoover MD  General Surgery   03/12/23

## 2023-03-12 NOTE — PROGRESS NOTES
Progress Note - Husam Estrada 82 y o  female MRN: [id number]    Unit/Bed#: [place] [address] -01 Encounter: [id number]      Assessment:  81 y/o F w perforated appendix, 3 cm abscess not amendable to IR drainage  Afebrile  abd soft,  RLQ tenderness, non distended  Plan:  -Plan for repeat CT scan today w/ PO contrast to evaluate abscess  If improvement or resolution cleared to be discharged from a surgical perspective to complete a 10 day course of Augmentin  -Diet as tolerated  -Continue zosyn for now  -Continue to hold on coumadin until CT  -Remainder of care per primary    Subjective:   No acute events overnight  Pain is better  Tolerating diet  Having diarrhea  Objective:     Vitals: Blood pressure (!) 101/39, pulse (!) 53, temperature 97 9 °F (36 6 °C), resp  rate 12, weight 65 8 kg (145 lb 1 oz), SpO2 96 %  ,Body mass index is 24 9 kg/m²  No intake or output data in the 24 hours ending 03/12/23 0835    Physical Exam:  General: NAD  HENT: NCAT MMM  Neck: supple, no JVD  CV: bradycardic  Lungs: nl wob   No resp distress  ABD: Soft, mild tenderness in the RLQ, nondistended  Extrem: No CCE  Neuro: AAOx3      Scheduled Meds:  Current Facility-Administered Medications   Medication Dose Route Frequency Provider Last Rate   • acetaminophen  650 mg Oral Q6H PRN Yessi Rankin PA-C     • aluminum-magnesium hydroxide-simethicone  30 mL Oral Q6H PRN Yessi Rankin PA-C     • atorvastatin  40 mg Oral QPM Yessi Rankin PA-C     • famotidine  20 mg Oral BID PRN Yessi Rankin PA-C     • HYDROmorphone  2 mg Intravenous Q4H PRN Yessi Rankin PA-C     • letrozole  2 5 mg Oral Daily Yessi Rankin PA-C     • levETIRAcetam  250 mg Oral Daily PRN Yessi Rankin PA-C     • levETIRAcetam  500 mg Oral Q12H [address] Yessi Rankin PA-C     • LORazepam  0 5 mg Oral Q8H PRN Yessi Rankin PA-C     • lubiprostone  24 mcg Oral BID With Meals Yessi Rankin PA-C     • metoprolol succinate  25 mg Oral BID Kimberly Moe PA-C     • midodrine  5 [facility] TID [facility] Kaelyn Mohan MD     • ondansetron  4 mg Intravenous Q6H PRN Kimberly Moe PA-C     • pantoprazole  20 mg Oral Early Morning Kimberly Moe PA-C     • piperacillin-tazobactam  2 25 g Intravenous Q8H Kimberly Moe PA-C 2 25 g (03/12/23 7601)   • saccharomyces boulardii  250 mg Oral BID Nhi Tinajero PA-C     • simethicone  80 mg Oral Daily Kimberly Moe PA-C       Continuous Infusions:   PRN Meds: •  acetaminophen  •  aluminum-magnesium hydroxide-simethicone  •  famotidine  •  HYDROmorphone  •  levETIRAcetam  •  LORazepam  •  ondansetron      Invasive Devices     Peripheral Intravenous Line  Duration           Peripheral IV 03/09/23 Proximal;Right;Ventral (anterior) Forearm 3 days    Peripheral IV 03/10/23 Right;Ventral (anterior) Forearm 1 day          Hemodialysis Catheter  Duration           HD Permanent Double Catheter -- days                Lab, Imaging and other studies: I have personally reviewed pertinent reports      VTE Pharmacologic Prophylaxis: Sequential compression device (Venodyne)   VTE Mechanical Prophylaxis: sequential compression device

## 2023-03-12 NOTE — PROGRESS NOTES
[facility]  Progress Note Luisa Martinez 1940, 82 y o  female MRN: [id number]  Unit/Bed#: [place] [address] -01 Encounter: [id number]  Primary Care Provider: Tyrese Rojas DO   Date and time admitted to hospital: 3/9/2023 12:11 AM    * Abscess, periappendiceal  Assessment & Plan  · Patient presented to the ED with her daughter following 3 weeks of post-prandial stomach pain and occasional vomiting  Daughter reports patient had appointment with GI soon, but for the last two days, the patient has not been able to tolerate any oral intake and so not able to keep down medications  · Patient reports after eating pain 10/10 epigastric pain that radiates into right lower quadrant area  · CT abdomen/pelvis demonstrating "3 4 cm rim-enhancing gas and fluid collection at the right lower quadrant adjacent to the appendiceal tip presumed to represent a periappendiceal abscess  Extensive mesenteric vessel disease as detailed above which is not significantly changed from prior exams "    Patient seen by surgery  Shushan high risk for surgery, recommend IR drainage or medical treatment with IV abx  IR feels it is not amenable to drainage at the time of admission    Patient is on empiric Zosyn    Surgery is planning on repeating CT abd today  If the abscess is bigger, IR may be able to drain it  Coumadin is on hold in case she needs a procedure      History of DVT (deep vein thrombosis)  Assessment & Plan  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    Coumadin is on hold in case patient needs surgery or IR procedure  We did try a heparin drip, but patient is a difficult stick, we have been having trouble getting consistent access    The DVT was 6 months ago, so it is not likely high risk to be off of anticoagulation for a short period of time      Chronic atrial fibrillation (HCC)  Assessment & Plan  On metoprolol  Coumadin on hold as above    ESRD on dialysis [facility])  Assessment & Plan  Lab Results   Component Value Date    EGFR 18 2023    EGFR 9 03/10/2023    EGFR 16 2023    CREATININE 2 36 (H) 2023    CREATININE 4 24 (H) 03/10/2023    CREATININE 2 67 (H) 2023   · Patient currently on Monday, Wednesday, Friday HD schedule  · Renal following    Type 2 diabetes mellitus with chronic kidney disease on chronic dialysis, with long-term current use of insulin [facility])  Assessment & Plan  Lab Results   Component Value Date    HGBA1C 5 7 2022       Recent Labs     03/10/23  0001 03/10/23  0554 03/10/23  1220 03/10/23  1747   POCGLU 146* 90 105 94       Blood Sugar Average: Last 72 hrs:  · (P) 112 [id number]     Sliding scale for now  Subjective:   Having some diarrhea today  She has less abdominal pain  No fever nor chills  Objective:     Vitals:   Temp (24hrs), Av 1 °F (36 7 °C), Min:97 8 °F (36 6 °C), Max:98 3 °F (36 8 °C)    Temp:  [97 8 °F (36 6 °C)-98 3 °F (36 8 °C)] 97 9 °F (36 6 °C)  HR:  [45-57] 46  Resp:  [12-22] 12  BP: ()/(34-52) 116/43  SpO2:  [92 %-97 %] 94 %  Body mass index is 24 9 kg/m²  Input and Output Summary (last 24 hours): Intake/Output Summary (Last 24 hours) at 3/12/2023 1457  Last data filed at 3/12/2023 1301  Gross per 24 hour   Intake 620 ml   Output --   Net 620 ml       Physical Exam:     Physical Exam  Vitals and nursing note reviewed  HENT:      Head: Normocephalic and atraumatic  Eyes:      Pupils: Pupils are equal, round, and reactive to light  Cardiovascular:      Rate and Rhythm: Normal rate and regular rhythm  Heart sounds: No murmur heard  No friction rub  No gallop  Pulmonary:      Effort: Pulmonary effort is normal       Breath sounds: Normal breath sounds  No wheezing or rales  Abdominal:      General: Bowel sounds are normal       Palpations: Abdomen is soft  Tenderness: There is abdominal tenderness (only mild RLQ pain  it is improving)   There is no guarding or rebound  Musculoskeletal:      Right lower leg: No edema  Left lower leg: No edema          Additional Data:     Labs:    Results from last 7 days   Lab Units 03/11/23  1102 03/11/23  0632 03/10/23  0600   WBC Thousand/uL 6 59   < > 7 46   HEMOGLOBIN g/dL 9 4*   < > 9 1*   HEMATOCRIT % 31 2*   < > 28 9*   PLATELETS Thousands/uL 215   < > 190   NEUTROS PCT %  --   --  72   LYMPHS PCT %  --   --  17   MONOS PCT %  --   --  9   EOS PCT %  --   --  2    < > = values in this interval not displayed  Results from last 7 days   Lab Units 03/11/23  0632 03/10/23  0600 03/09/23  0209   POTASSIUM mmol/L 3 8   < > 4 6   CHLORIDE mmol/L 105   < > 99   CO2 mmol/L 28   < > 31   BUN mg/dL 7   < > 12   CREATININE mg/dL 2 36*   < > 2 67*   CALCIUM mg/dL 8 2*   < > 9 6   ALK PHOS U/L  --   --  138*   ALT U/L  --   --  8   AST U/L  --   --  11*    < > = values in this interval not displayed  Results from last 7 days   Lab Units 03/11/23  0632   INR  1 38*     Results from last 7 days   Lab Units 03/10/23  1747 03/10/23  1220 03/10/23  0554 03/10/23  0001 03/09/23  2052 03/09/23  1709 03/09/23  1250   POC GLUCOSE mg/dl 94 105 90 146* 138 104 110               * I Have Reviewed All Lab Data     Recent Cultures (last 7 days):     Results from last 7 days   Lab Units 03/09/23  1620   BLOOD CULTURE  No Growth at 48 hrs  No Growth at 48 hrs           Last 24 Hours Medication List:   Current Facility-Administered Medications   Medication Dose Route Frequency Provider Last Rate   • acetaminophen  650 mg Oral Q6H PRN Abby Elberts PA-C     • aluminum-magnesium hydroxide-simethicone  30 mL Oral Q6H PRN Abby Nickels, PA-C     • atorvastatin  40 mg Oral QPM Abby Elberts PA-C     • famotidine  20 mg Oral BID PRN Abby Win, PA-C     • HYDROmorphone  2 mg Intravenous Q4H PRN Abby Nickels, PA-C     • letrozole  2 5 mg Oral Daily Abby Walden PA-C     • levETIRAcetam  250 mg Oral Daily PRN Marcos Guzman PA-C     • levETIRAcetam  500 mg Oral Q12H [facility] [place], [place]     • LORazepam  0 5 mg Oral Q8H PRN Marcos Guzman PA-C     • lubiprostone  24 mcg Oral BID With Meals Marcos Guzman PA-C     • metoprolol succinate  25 mg Oral BID Marcos Guzman PA-C     • midodrine  5 mg Oral TID [facility] Rey Mckoy MD     • ondansetron  4 mg Intravenous Q6H PRN Marcos Guzman PA-C     • pantoprazole  20 mg Oral Early Morning Marcos Guzman PA-C     • piperacillin-tazobactam  2 25 g Intravenous Q8H Marcos Guzman PA-C 2 25 g (03/12/23 4934)   • saccharomyces boulardii  250 mg Oral BID Osiris Pedraza PA-C     • simethicone  80 mg Oral Daily Marcos Guzman PA-C           VTE Pharmacologic Prophylaxis:   Pharmacologic: Heparin Drip      Current Length of Stay: 3 day(s)    Current Patient Status: Inpatient       Discharge Plan: per surgery      Code Status: Level 1 - Full Code           Today, Patient Was Seen By: Ladonna Morocho DO    ** Please Note: Dictation voice to text software may have been used in the creation of this document   **

## 2023-03-12 NOTE — PLAN OF CARE
Problem: MOBILITY - ADULT  Goal: Maintain or return to baseline ADL function  Description: INTERVENTIONS:  -  Assess patient's ability to carry out ADLs; assess patient's baseline for ADL function and identify physical deficits which impact ability to perform ADLs (bathing, care of mouth/teeth, toileting, grooming, dressing, etc )  - Assess/evaluate cause of self-care deficits   - Assess range of motion  - Assess patient's mobility; develop plan if impaired  - Assess patient's need for assistive devices and provide as appropriate  - Encourage maximum independence but intervene and supervise when necessary  - Involve family in performance of ADLs  - Assess for home care needs following discharge   - Consider OT consult to assist with ADL evaluation and planning for discharge  - Provide patient education as appropriate  Outcome: Progressing  Goal: Maintains/Returns to pre admission functional level  Description: INTERVENTIONS:  - Perform BMAT or MOVE assessment daily    - Set and communicate daily mobility goal to care team and patient/family/caregiver  - Collaborate with rehabilitation services on mobility goals if consulted  - Perform Range of Motion 2 times a day  - Reposition patient every 2 hours    - Dangle patient 2 times a day  - Stand patient 2 times a day  - Ambulate patient 3 times a day  - Out of bed to chair 3 times a day   - Out of bed for meals 3 times a day  - Out of bed for toileting  - Record patient progress and toleration of activity level   Outcome: Progressing     Problem: PAIN - ADULT  Goal: Verbalizes/displays adequate comfort level or baseline comfort level  Description: Interventions:  - Encourage patient to monitor pain and request assistance  - Assess pain using appropriate pain scale  - Administer analgesics based on type and severity of pain and evaluate response  - Implement non-pharmacological measures as appropriate and evaluate response  - Consider cultural and social influences on pain and pain management  - Notify physician/advanced practitioner if interventions unsuccessful or patient reports new pain  Outcome: Progressing     Problem: INFECTION - ADULT  Goal: Absence or prevention of progression during hospitalization  Description: INTERVENTIONS:  - Assess and monitor for signs and symptoms of infection  - Monitor lab/diagnostic results  - Monitor all insertion sites, i e  indwelling lines, tubes, and drains  - Monitor endotracheal if appropriate and nasal secretions for changes in amount and color  - [place] appropriate cooling/warming therapies per order  - Administer medications as ordered  - Instruct and encourage patient and family to use good hand hygiene technique  - Identify and instruct in appropriate isolation precautions for identified infection/condition  Outcome: Progressing  Goal: Absence of fever/infection during neutropenic period  Description: INTERVENTIONS:  - Monitor WBC    Outcome: Progressing     Problem: SAFETY ADULT  Goal: Maintain or return to baseline ADL function  Description: INTERVENTIONS:  -  Assess patient's ability to carry out ADLs; assess patient's baseline for ADL function and identify physical deficits which impact ability to perform ADLs (bathing, care of mouth/teeth, toileting, grooming, dressing, etc )  - Assess/evaluate cause of self-care deficits   - Assess range of motion  - Assess patient's mobility; develop plan if impaired  - Assess patient's need for assistive devices and provide as appropriate  - Encourage maximum independence but intervene and supervise when necessary  - Involve family in performance of ADLs  - Assess for home care needs following discharge   - Consider OT consult to assist with ADL evaluation and planning for discharge  - Provide patient education as appropriate  Outcome: Progressing  Goal: Maintains/Returns to pre admission functional level  Description: INTERVENTIONS:  - Perform BMAT or MOVE assessment daily    - Set and communicate daily mobility goal to care team and patient/family/caregiver  - Collaborate with rehabilitation services on mobility goals if consulted  - Perform Range of Motion 2 times a day  - Reposition patient every 2 hours    - Dangle patient 2 times a day  - Stand patient 2 times a day  - Ambulate patient 3 times a day  - Out of bed to chair 3 times a day   - Out of bed for meals 3 times a day  - Out of bed for toileting  - Record patient progress and toleration of activity level   Outcome: Progressing  Goal: Patient will remain free of falls  Description: INTERVENTIONS:  - Educate patient/family on patient safety including physical limitations  - Instruct patient to call for assistance with activity   - Consult OT/PT to assist with strengthening/mobility   - Keep Call bell within reach  - Keep bed low and locked with side rails adjusted as appropriate  - Keep care items and personal belongings within reach  - Initiate and maintain comfort rounds  - Make Fall Risk Sign visible to staff  - Offer Toileting every 2 Hours, in advance of need  - Initiate/Maintain bed alarm  - Obtain necessary fall risk management equipment: yellow bracelet, yellow socks, bed alarm  - Apply yellow socks and bracelet for high fall risk patients  - Consider moving patient to room near nurses station  Outcome: Progressing     Problem: DISCHARGE PLANNING  Goal: Discharge to home or other facility with appropriate resources  Description: INTERVENTIONS:  - Identify barriers to discharge w/patient and caregiver  - Arrange for needed discharge resources and transportation as appropriate  - Identify discharge learning needs (meds, wound care, etc )  - Arrange for interpretive services to assist at discharge as needed  - Refer to Case Management Department for coordinating discharge planning if the patient needs post-hospital services based on physician/advanced practitioner order or complex needs related to functional status, cognitive ability, or social support system  Outcome: Progressing     Problem: Knowledge Deficit  Goal: Patient/family/caregiver demonstrates understanding of disease process, treatment plan, medications, and discharge instructions  Description: Complete learning assessment and assess knowledge base  Interventions:  - Provide teaching at level of understanding  - Provide teaching via preferred learning methods  Outcome: Progressing     Problem: Prexisting or High Potential for Compromised Skin Integrity  Goal: Skin integrity is maintained or improved  Description: INTERVENTIONS:  - Identify patients at risk for skin breakdown  - Assess and monitor skin integrity  - Assess and monitor nutrition and hydration status  - Monitor labs   - Assess for incontinence   - Turn and reposition patient  - Assist with mobility/ambulation  - Relieve pressure over bony prominences  - Avoid friction and shearing  - Provide appropriate hygiene as needed including keeping skin clean and dry  - Evaluate need for skin moisturizer/barrier cream  - Collaborate with interdisciplinary team   - Patient/family teaching  - Consider wound care consult   Outcome: Progressing     Problem: Nutrition/Hydration-ADULT  Goal: Nutrient/Hydration intake appropriate for improving, restoring or maintaining nutritional needs  Description: Monitor and assess patient's nutrition/hydration status for malnutrition  Collaborate with interdisciplinary team and initiate plan and interventions as ordered  Monitor patient's weight and dietary intake as ordered or per policy  Utilize nutrition screening tool and intervene as necessary  Determine patient's food preferences and provide high-protein, high-caloric foods as appropriate       INTERVENTIONS:  - Monitor oral intake, urinary output, labs, and treatment plans  - Assess nutrition and hydration status and recommend course of action  - Evaluate amount of meals eaten  - Assist patient with eating if necessary   - Allow adequate time for meals  - Recommend/ encourage appropriate diets, oral nutritional supplements, and vitamin/mineral supplements  - Order, calculate, and assess calorie counts as needed  - Recommend, monitor, and adjust tube feedings and TPN/PPN based on assessed needs  - Assess need for intravenous fluids  - Provide specific nutrition/hydration education as appropriate  - Include patient/family/caregiver in decisions related to nutrition  Outcome: Progressing     Problem: METABOLIC, FLUID AND ELECTROLYTES - ADULT  Goal: Electrolytes maintained within normal limits  Description: INTERVENTIONS:  - Monitor labs and assess patient for signs and symptoms of electrolyte imbalances  - Administer electrolyte replacement as ordered  - Monitor response to electrolyte replacements, including repeat lab results as appropriate  - Instruct patient on fluid and nutrition as appropriate  Outcome: Progressing  Goal: Fluid balance maintained  Description: INTERVENTIONS:  - Monitor labs   - Monitor I/O and WT  - Instruct patient on fluid and nutrition as appropriate  - Assess for signs & symptoms of volume excess or deficit  Outcome: Progressing

## 2023-03-12 NOTE — ASSESSMENT & PLAN NOTE
Lab Results   Component Value Date    HGBA1C 5 7 09/01/2022       Recent Labs     03/10/23  0001 03/10/23  0554 03/10/23  1220 03/10/23  1747   POCGLU 146* 90 105 94       Blood Sugar Average: Last 72 hrs:  · (P) 112 [id number]     Sliding scale for now

## 2023-03-12 NOTE — ASSESSMENT & PLAN NOTE
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

## 2023-03-13 ENCOUNTER — APPOINTMENT (INPATIENT)
Dept: DIALYSIS | Facility: HOSPITAL | Age: 83
End: 2023-03-13
Attending: INTERNAL MEDICINE

## 2023-03-13 LAB
ANION GAP SERPL CALCULATED.3IONS-SCNC: 10 MMOL/L (ref 4–13)
ATRIAL RATE: 67 BPM
BASOPHILS # BLD AUTO: 0.04 THOUSANDS/ÂΜL (ref 0–0.1)
BASOPHILS NFR BLD AUTO: 1 % (ref 0–1)
BUN SERPL-MCNC: 17 MG/DL (ref 5–25)
CALCIUM SERPL-MCNC: 8.8 MG/DL (ref 8.4–10.2)
CHLORIDE SERPL-SCNC: 107 MMOL/L (ref 96–108)
CO2 SERPL-SCNC: 22 MMOL/L (ref 21–32)
CREAT SERPL-MCNC: 5.51 MG/DL (ref 0.6–1.3)
EOSINOPHIL # BLD AUTO: 0.24 THOUSAND/ÂΜL (ref 0–0.61)
EOSINOPHIL NFR BLD AUTO: 4 % (ref 0–6)
ERYTHROCYTE [DISTWIDTH] IN BLOOD BY AUTOMATED COUNT: 13.2 % (ref 11.6–15.1)
FLUAV RNA RESP QL NAA+PROBE: NEGATIVE
FLUBV RNA RESP QL NAA+PROBE: NEGATIVE
GFR SERPL CREATININE-BSD FRML MDRD: 6 ML/MIN/1.73SQ M
GLUCOSE SERPL-MCNC: 82 MG/DL (ref 65–140)
HCT VFR BLD AUTO: 32 % (ref 34.8–46.1)
HGB BLD-MCNC: 10.1 G/DL (ref 11.5–15.4)
IMM GRANULOCYTES # BLD AUTO: 0.04 THOUSAND/UL (ref 0–0.2)
IMM GRANULOCYTES NFR BLD AUTO: 1 % (ref 0–2)
INR PPP: 1.61 (ref 0.84–1.19)
LYMPHOCYTES # BLD AUTO: 1.54 THOUSANDS/ÂΜL (ref 0.6–4.47)
LYMPHOCYTES NFR BLD AUTO: 26 % (ref 14–44)
MAGNESIUM SERPL-MCNC: 1.7 MG/DL (ref 1.9–2.7)
MCH RBC QN AUTO: 30.7 PG (ref 26.8–34.3)
MCHC RBC AUTO-ENTMCNC: 31.6 G/DL (ref 31.4–37.4)
MCV RBC AUTO: 97 FL (ref 82–98)
MONOCYTES # BLD AUTO: 0.45 THOUSAND/ÂΜL (ref 0.17–1.22)
MONOCYTES NFR BLD AUTO: 8 % (ref 4–12)
NEUTROPHILS # BLD AUTO: 3.55 THOUSANDS/ÂΜL (ref 1.85–7.62)
NEUTS SEG NFR BLD AUTO: 60 % (ref 43–75)
NRBC BLD AUTO-RTO: 0 /100 WBCS
P AXIS: 80 DEGREES
PLATELET # BLD AUTO: 228 THOUSANDS/UL (ref 149–390)
PMV BLD AUTO: 10 FL (ref 8.9–12.7)
POTASSIUM SERPL-SCNC: 3.5 MMOL/L (ref 3.5–5.3)
PR INTERVAL: 168 MS
PROTHROMBIN TIME: 19.1 SECONDS (ref 11.6–14.5)
QRS AXIS: -42 DEGREES
QRSD INTERVAL: 96 MS
QT INTERVAL: 604 MS
QTC INTERVAL: 638 MS
RBC # BLD AUTO: 3.29 MILLION/UL (ref 3.81–5.12)
RSV RNA RESP QL NAA+PROBE: NEGATIVE
SARS-COV-2 RNA RESP QL NAA+PROBE: NEGATIVE
SODIUM SERPL-SCNC: 139 MMOL/L (ref 135–147)
T WAVE AXIS: 46 DEGREES
VENTRICULAR RATE: 67 BPM
WBC # BLD AUTO: 5.86 THOUSAND/UL (ref 4.31–10.16)

## 2023-03-13 RX ORDER — LANOLIN ALCOHOL/MO/W.PET/CERES
400 CREAM (GRAM) TOPICAL DAILY
Status: DISCONTINUED | OUTPATIENT
Start: 2023-03-13 | End: 2023-03-14 | Stop reason: HOSPADM

## 2023-03-13 RX ORDER — MAGNESIUM SULFATE HEPTAHYDRATE 40 MG/ML
2 INJECTION, SOLUTION INTRAVENOUS ONCE
Status: DISCONTINUED | OUTPATIENT
Start: 2023-03-13 | End: 2023-03-13

## 2023-03-13 RX ORDER — LOPERAMIDE HYDROCHLORIDE 2 MG/1
2 CAPSULE ORAL ONCE
Status: COMPLETED | OUTPATIENT
Start: 2023-03-13 | End: 2023-03-13

## 2023-03-13 RX ORDER — WARFARIN SODIUM 5 MG/1
5 TABLET ORAL
Status: DISCONTINUED | OUTPATIENT
Start: 2023-03-13 | End: 2023-03-14 | Stop reason: HOSPADM

## 2023-03-13 RX ORDER — HYDROXYZINE HYDROCHLORIDE 25 MG/1
25 TABLET, FILM COATED ORAL EVERY 6 HOURS PRN
Status: DISCONTINUED | OUTPATIENT
Start: 2023-03-13 | End: 2023-03-14 | Stop reason: HOSPADM

## 2023-03-13 RX ORDER — HYDROCORTISONE 25 MG/G
CREAM TOPICAL 4 TIMES DAILY PRN
Status: DISCONTINUED | OUTPATIENT
Start: 2023-03-13 | End: 2023-03-14 | Stop reason: HOSPADM

## 2023-03-13 RX ORDER — AMOXICILLIN AND CLAVULANATE POTASSIUM 500; 125 MG/1; MG/1
1 TABLET, FILM COATED ORAL EVERY 24 HOURS
Status: DISCONTINUED | OUTPATIENT
Start: 2023-03-13 | End: 2023-03-14 | Stop reason: HOSPADM

## 2023-03-13 RX ADMIN — Medication 250 MG: at 17:21

## 2023-03-13 RX ADMIN — SIMETHICONE 80 MG: 80 TABLET, CHEWABLE ORAL at 08:23

## 2023-03-13 RX ADMIN — PANTOPRAZOLE SODIUM 20 MG: 20 TABLET, DELAYED RELEASE ORAL at 06:02

## 2023-03-13 RX ADMIN — PIPERACILLIN AND TAZOBACTAM 2.25 G: 2; .25 INJECTION, POWDER, LYOPHILIZED, FOR SOLUTION INTRAVENOUS at 13:04

## 2023-03-13 RX ADMIN — METOPROLOL SUCCINATE 25 MG: 25 TABLET, EXTENDED RELEASE ORAL at 17:37

## 2023-03-13 RX ADMIN — FAMOTIDINE 20 MG: 20 TABLET, FILM COATED ORAL at 22:35

## 2023-03-13 RX ADMIN — MIDODRINE HYDROCHLORIDE 5 MG: 5 TABLET ORAL at 06:02

## 2023-03-13 RX ADMIN — LOPERAMIDE HYDROCHLORIDE 2 MG: 2 CAPSULE ORAL at 23:09

## 2023-03-13 RX ADMIN — ONDANSETRON 4 MG: 2 INJECTION INTRAMUSCULAR; INTRAVENOUS at 21:37

## 2023-03-13 RX ADMIN — LEVETIRACETAM 500 MG: 100 SOLUTION ORAL at 08:23

## 2023-03-13 RX ADMIN — ATORVASTATIN CALCIUM 40 MG: 40 TABLET, FILM COATED ORAL at 17:21

## 2023-03-13 RX ADMIN — PIPERACILLIN AND TAZOBACTAM 2.25 G: 2; .25 INJECTION, POWDER, LYOPHILIZED, FOR SOLUTION INTRAVENOUS at 06:02

## 2023-03-13 RX ADMIN — LEVETIRACETAM 500 MG: 100 SOLUTION ORAL at 21:37

## 2023-03-13 RX ADMIN — Medication 400 MG: at 09:01

## 2023-03-13 RX ADMIN — AMOXICILLIN AND CLAVULANATE POTASSIUM 1 TABLET: 500; 125 TABLET, FILM COATED ORAL at 17:21

## 2023-03-13 RX ADMIN — WARFARIN SODIUM 5 MG: 5 TABLET ORAL at 17:21

## 2023-03-13 RX ADMIN — Medication 250 MG: at 08:23

## 2023-03-13 RX ADMIN — MIDODRINE HYDROCHLORIDE 5 MG: 5 TABLET ORAL at 11:46

## 2023-03-13 RX ADMIN — LETROZOLE 2.5 MG: 2.5 TABLET, FILM COATED ORAL at 08:23

## 2023-03-13 RX ADMIN — MIDODRINE HYDROCHLORIDE 5 MG: 5 TABLET ORAL at 15:15

## 2023-03-13 RX ADMIN — HYDROXYZINE HYDROCHLORIDE 25 MG: 25 TABLET, FILM COATED ORAL at 21:37

## 2023-03-13 NOTE — PROGRESS NOTES
NEPHROLOGY PROGRESS NOTE   Nabila Burks 82 y o  female MRN: [id number]  Unit/Bed#: [place] [facility] 2 -01 Encounter: [id number]  Reason for Consult: ESRD    ASSESSMENT AND PLAN:  ESRD on HD on MWF schedule at [facility]  -HD today  Outpatient dry weight 61 kg  Last post HD weight 61 9 kg  -Discontinue Mylanta in dialysis patients    Access, currently has permacath placed    Borderline hypomagnesemia, serum magnesium 1 7  -Occasionally PPI can cause hypomagnesemia  Consider discontinue if possible  -We will give magnesium oxide 400 mg p o  daily for total 3 days    Anemia in CKD  -Hemoglobin overall stable, goal hemoglobin 10-11  Transfuse as needed for hemoglobin less than 7  -Remains on Mircera 30 mcg every 4 weeks as outpatient  If hemoglobin dropping, consider Epogen while inpatient    CKD BMD, continue to monitor phosphorus, PTH  -Last serum phosphorus 3 4 at goal    Hypotension, blood pressure remains lower   -Now remains on midodrine 5 mg p o  3 times daily  Also on metoprolol  Continue to avoid amlodipine  Periappendiceal abscess, not accessible percutaneously as per IR  -Currently remains on antibiotic, plan noted to change to p o  Augmentin to complete total 10 days of antibiotic  Zosyn being discontinued today as per primary team   ID follow-up  -Repeat CT scan shows significantly decreased in the size of fluid and gas collection adjacent to appendiceal tip and several loops of small bowel  Discussed above plan in detail with primary team and they agree with above recommendations  SUBJECTIVE:  Patient seen and examined at bedside  Patient is primarily [place] speaking  Daughter present at bedside who helped with translation as per patient's request   Patient denies any nausea, vomiting  Denies any shortness of breath      OBJECTIVE:  Current Weight: Weight - Scale: 66 kg (145 lb 8 1 oz)  Vitals:    03/13/23 0726   BP: (!) 99/47   Pulse: 56   Resp: 16   Temp: 98 3 °F (36 8 °C) SpO2: 97%       Intake/Output Summary (Last 24 hours) at 3/13/2023 0835  Last data filed at 3/13/2023 0000  Gross per 24 hour   Intake 1070 ml   Output --   Net 1070 ml     Wt Readings from Last 3 Encounters:   03/13/23 66 kg (145 lb 8 1 oz)   01/31/23 59 9 kg (132 lb)   01/09/23 60 kg (132 lb 4 4 oz)     Temp Readings from Last 3 Encounters:   03/13/23 98 3 °F (36 8 °C)   01/19/23 98 3 °F (36 8 °C) (Oral)   01/10/23 97 7 °F (36 5 °C) (Temporal)     BP Readings from Last 3 Encounters:   03/13/23 (!) 99/47   01/19/23 121/55   01/10/23 125/61     Pulse Readings from Last 3 Encounters:   03/13/23 56   01/31/23 72   01/19/23 62        Physical Examination:  Eyes: Mild conjunctival pallor present  Neck: No obvious lymphadenopathy appreciated  Respiratory: Bilateral air entry present  CVS: No significant edema in legs  GI: Soft, nondistended  CNS: Active, alert, follows commands  Skin: No new rash  Musculoskeletal: No obvious new gross deformity noted    Medications:    Current Facility-Administered Medications:   •  acetaminophen (TYLENOL) tablet 650 mg, 650 mg, Oral, Q6H PRN, Cristiana Che PA-C  •  aluminum-magnesium hydroxide-simethicone (MYLANTA) oral suspension 30 mL, 30 mL, Oral, Q6H PRN, Cristiana Che PA-C  •  amoxicillin-clavulanate (AUGMENTIN) 500-125 mg per tablet 1 tablet, 1 tablet, Oral, Q24H, Sandra Cassidy MD  •  atorvastatin (LIPITOR) tablet 40 mg, 40 mg, Oral, QPM, Cristiana Che PA-C, 40 mg at 03/11/23 1722  •  famotidine (PEPCID) tablet 20 mg, 20 mg, Oral, BID PRN, Cristiana Che PA-C  •  HYDROmorphone (DILAUDID) injection 2 mg, 2 mg, Intravenous, Q4H PRN, Cristiana Che PA-C, 2 mg at 03/09/23 2008  •  letrozole [facility]) tablet 2 5 mg, 2 5 mg, Oral, Daily, Cristiana Che PA-C, 2 5 mg at 03/13/23 0383  •  levETIRAcetam (KEPPRA) oral solution 250 mg, 250 mg, Oral, Daily PRN, Cristiana Che PA-C  •  levETIRAcetam (KEPPRA) oral solution 500 mg, 500 mg, Oral, Q12H [address], Isaac Brown Paula Douglas PA-C, 500 mg at 03/13/23 3298  •  LORazepam (ATIVAN) tablet 0 5 mg, 0 5 mg, Oral, Q8H PRN, Sarah Montes De Oca PA-C  •  metoprolol succinate (TOPROL-XL) 24 hr tablet 25 mg, 25 mg, Oral, BID, Sarah Montes De Oca PA-C, 25 mg at 03/11/23 3088  •  midodrine (PROAMATINE) tablet 5 mg, 5 mg, Oral, TID AC, El Bernardo MD, 5 mg at 03/13/23 0602  •  ondansetron [facility]) injection 4 mg, 4 mg, Intravenous, Q6H PRN, Sarah Montes De Oca PA-C, 4 mg at 03/09/23 1938  •  pantoprazole (PROTONIX) EC tablet 20 mg, 20 mg, Oral, Early Morning, Sarah Montes De Oca PA-C, 20 mg at 03/13/23 0602  •  piperacillin-tazobactam (ZOSYN) 2 25 g in sodium chloride 0 9 % 50 mL IVPB, 2 25 g, Intravenous, Q8H, Cheryl Saini MD, Last Rate: 100 mL/hr at 03/13/23 0602, 2 25 g at 03/13/23 0602  •  saccharomyces boulardii (FLORASTOR) capsule 250 mg, 250 mg, Oral, BID, Osiris Pedraza PA-C, 250 mg at 03/13/23 1087  •  simethicone (MYLICON) chewable tablet 80 mg, 80 mg, Oral, Daily, Sarah Montes De Oca PA-C, 80 mg at 03/13/23 3628  •  warfarin (COUMADIN) tablet 5 mg, 5 mg, Oral, Daily (warfarin), Brandon Cervantes MD, 5 mg at 03/12/23 2044    Laboratory Results:  Results from last 7 days   Lab Units 03/13/23  0602 03/11/23  1102 03/11/23  0632 03/10/23  0600 03/09/23  0209   WBC Thousand/uL 5 86 6 59 5 02 7 46 10 53*   HEMOGLOBIN g/dL 10 1* 9 4* 8 9* 9 1* 10 8*   HEMATOCRIT % 32 0* 31 2* 28 4* 28 9* 34 1*   PLATELETS Thousands/uL 228 215 189 190 212   SODIUM mmol/L 139  --  144 138 139   POTASSIUM mmol/L 3 5  --  3 8 4 1 4 6   CHLORIDE mmol/L 107  --  105 101 99   CO2 mmol/L 22  --  28 30 31   BUN mg/dL 17  --  7 22 12   CREATININE mg/dL 5 51*  --  2 36* 4 24* 2 67*   CALCIUM mg/dL 8 8  --  8 2* 8 6 9 6   MAGNESIUM mg/dL 1 7*  --   --  1 7*  --    PHOSPHORUS mg/dL  --   --   --  3 4  --        CT abdomen pelvis wo contrast   Final Result by Muriel Devlin MD (03/12 7820)      1    Significantly decreased size of a fluid and gas collection in the right lower quadrant adjacent to the appendiceal tip and several loops of small bowel  No new collections  2   New/worsened pleural effusions  Workstation performed: [id number]         IR drainage tube placement   Final Result by Clarisa Jacobson MD (03/10 0707)   Abdominal abscess is not accessible percutaneously  Findings communicated to the patient's daughter and surgery service  Workstation performed: [id number]         CTA abdomen pelvis w wo contrast   Final Result by Mony Blunt MD (03/09 0407)      3 4 cm rim-enhancing gas and fluid collection at the right lower quadrant adjacent to the appendiceal tip presumed to represent a periappendiceal abscess  Extensive mesenteric vessel disease as detailed above which is not significantly changed from prior exams  Findings discussed with Dr Gibson Drake at 12 AM, 3/9/2023         Workstation performed: [id number]             Portions of the record may have been created with voice recognition software  Occasional wrong word or "sound a like" substitutions may have occurred due to the inherent limitations of voice recognition software  Read the chart carefully and recognize, using context, where substitutions have occurred

## 2023-03-13 NOTE — ASSESSMENT & PLAN NOTE
Lab Results   Component Value Date    EGFR 6 03/13/2023    EGFR 18 03/11/2023    EGFR 9 03/10/2023    CREATININE 5 51 (H) 03/13/2023    CREATININE 2 36 (H) 03/11/2023    CREATININE 4 24 (H) 03/10/2023     Patient currently on Monday, Wednesday, Friday HD schedule  Appreciate nephrology input

## 2023-03-13 NOTE — ASSESSMENT & PLAN NOTE
Lab Results   Component Value Date    HGBA1C 5 7 09/01/2022     On SSI coverage per [employer] while hospitalized  Hypoglycemia protocol

## 2023-03-13 NOTE — ASSESSMENT & PLAN NOTE
Patient with extensive history of ob/gyn related cancers  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/p radiation in 2018  Ovarian cancer s/p total abdominal hysterectomy  Followed outpatient by hem/onc, pain management

## 2023-03-13 NOTE — PLAN OF CARE
Problem: OCCUPATIONAL THERAPY ADULT  Goal: Performs self-care activities at highest level of function for planned discharge setting  See evaluation for individualized goals  Description: Treatment Interventions: ADL retraining, Functional transfer training, UE strengthening/ROM, Endurance training, Patient/family training, Equipment evaluation/education, Neuromuscular reeducation, Compensatory technique education, Continued evaluation, Energy conservation, Activityengagement          See flowsheet documentation for full assessment, interventions and recommendations     Outcome: Progressing  Note: Limitation: Decreased ADL status, Decreased UE strength, Decreased Safe judgement during ADL, Decreased endurance, Decreased high-level ADLs, Decreased self-care trans  Prognosis: Fair  Assessment:  see note  OT Discharge Recommendation: Home with home health rehabilitation

## 2023-03-13 NOTE — PLAN OF CARE
Post-Dialysis RN Treatment Note    Blood Pressure:  Pre 126/50 mm/Hg  Post 105/41 mmHg   EDW  61 kg    Weight:  Pre 63 6 kg   Post 62 kg   Mode of weight measurement: Bed Scale   Volume Removed  1503 ml    Treatment duration 210 minutes    NS given  No    Treatment shortened? No   Medications given during Rx None Reported   Estimated Kt/V  None Reported   Access type: Permacath/TDC   Access Issues: Yes, describe: BFR maintained at 350 instead of 400    Report called to primary nurse   Yes Clementine Martin RN      Current hemodialysis plan of care is to remove a total of 2000 ml of fluid over a 3 1/2 hour treatment for a net of 1 5 liters as tolerated  Monitor vital signs every 15 minutes while on treatment for patient safety  Utilize a 4 K+ bath for serum potassium of 3 5 to maintain electrolyte balance  Report received from Clementine Martin RN  Plan to be reviewed with Dr Jose Degree at bedside        Problem: METABOLIC, FLUID AND ELECTROLYTES - ADULT  Goal: Electrolytes maintained within normal limits  Description: INTERVENTIONS:  - Monitor labs and assess patient for signs and symptoms of electrolyte imbalances  - Administer electrolyte replacement as ordered  - Monitor response to electrolyte replacements, including repeat lab results as appropriate  - Instruct patient on fluid and nutrition as appropriate  Outcome: Progressing  Goal: Fluid balance maintained  Description: INTERVENTIONS:  - Monitor labs   - Monitor I/O and WT  - Instruct patient on fluid and nutrition as appropriate  - Assess for signs & symptoms of volume excess or deficit  Outcome: Progressing

## 2023-03-13 NOTE — PROGRESS NOTES
Progress Note - Infectious Disease   Rene Vallejo 82 y o  female MRN: [id number]  Unit/Bed#: [place] [address] -01 Encounter: [id number]      Impression/Plan:  1  Appendiceal abscess:  - Patient with about 3 weeks of abdominal pains, found on CT 3/9 to have a 3 4 cm rim-enhancing gas and fluid collection in RLQ, adjacent to appendix  Abdominal exam benign, no clinical sings of peritonitis  IR does not feel can access collection for percutaneous drainage  Repeat CT on 3/12 showed decreasing size, now 1 8 X 1 6 X 2 1 cm  No interventions planned per surgery, recommending discharge on PO antibiotics     - will transition to PO Augmentin, dose will be 500 mg every 24 hours (taken after HD on HD days)  - would treat until radiographic resolution given lack of source control; would give script for 3 weeks of Augmentin, plan to repeat CT around 3/21 (non-con ok), then follow up in ID office after; if abscess resolved then can stop antibiotics  - follow up with Surgery as outpatient for definitive appendix management     2  ESRD on HD:   - Has left IJ PermCath and a non-functional right arm AV fistula  No tenderness at either site or erythema      - renally adjust antibiotics     3  Afib/Hx of DVT on AC:  - Noted, on warfarin     4  Hx of Breast CA/Ovarian CA:  - had metastasis to chest wall, s/p radiation in 2018  Also had ovarian cancer with total abdominal hysterectomy       - currently on letrozole     5  T2DM     6  Seizure disorder:  - on home [address] and recommendations were discussed with primary team   ID will sign off  Call with questions  Antibiotics:  Pip-Tazo    Subjective:  Patient has no fever, chills, sweats  No abdominal pain  She does have some new diarrhea  Repeat CT scan done 3/12 noted decreased size of abscess     Objective:  Vitals:  Temp:  [97 8 °F (36 6 °C)-98 7 °F (37 1 °C)] 98 3 °F (36 8 °C)  HR:  [46-57] 56  Resp:  [16-20] 16  BP: ()/(37-55) 99/47  SpO2:  [94 %-98 %] 97 %  Temp (24hrs), Av 3 °F (36 8 °C), Min:97 8 °F (36 6 °C), Max:98 7 °F (37 1 °C)  Current: Temperature: 98 3 °F (36 8 °C)    Physical Exam:   General Appearance:  Alert, interactive, nontoxic, no acute distress  Throat: Oropharynx moist without lesions  Lungs:   Clear to auscultation bilaterally; no wheezes, rhonchi or rales; respirations unlabored   Heart:  RRR; no murmur, rub or gallop   Abdomen:   Soft, non-tender, non-distended, positive bowel sounds  Extremities: No clubbing, cyanosis or edema   Skin: No new rashes or lesions  No draining wounds noted  Labs: All pertinent labs and imaging studies were personally reviewed  Results from last 7 days   Lab Units 23  0602 23  1102 23  0632   WBC Thousand/uL 5 86 6 59 5 02   HEMOGLOBIN g/dL 10 1* 9 4* 8 9*   PLATELETS Thousands/uL 228 215 189     Results from last 7 days   Lab Units 23  0602 23  0632 03/10/23  0600 23  0209   SODIUM mmol/L 139 144 138 139   POTASSIUM mmol/L 3 5 3 8 4 1 4 6   CHLORIDE mmol/L 107 105 101 99   CO2 mmol/L 22 28 30 31   BUN mg/dL 17 7 22 12   CREATININE mg/dL 5 51* 2 36* 4 24* 2 67*   EGFR ml/min/1 73sq m 6 18 9 16   CALCIUM mg/dL 8 8 8 2* 8 6 9 6   AST U/L  --   --   --  11*   ALT U/L  --   --   --  8   ALK PHOS U/L  --   --   --  138*       Micro:  Results from last 7 days   Lab Units 23  1620   BLOOD CULTURE  No Growth at 72 hrs  No Growth at 72 hrs  Imaging:    CTA abdomen/pelvis 3/9/23:  3 4 cm rim-enhancing gas and fluid collection at the right lower quadrant adjacent to the appendiceal tip presumed to represent a periappendiceal abscess      Extensive mesenteric vessel disease as detailed above which is not significantly changed from prior exams        Lori Cervantes MD  [facility]

## 2023-03-13 NOTE — OCCUPATIONAL THERAPY NOTE
Occupational Therapy Treatment Note    Name:  Valerie Baunelos   MRN:   [id number]  Age:     82 y o  Patient Active Problem List   Diagnosis    Ambulatory dysfunction    Chronic atrial fibrillation (HCC)    Acute on chronic diastolic heart failure ([place] [address] )    Coronary artery disease involving native heart without angina pectoris    ESRD on dialysis ([place] [address] )    Gastroesophageal reflux disease    History of DVT (deep vein thrombosis)    Pleural effusion    Type 2 diabetes mellitus with chronic kidney disease on chronic dialysis, with long-term current use of insulin (HCC)    Mixed hyperlipidemia    Gout    Left arm swelling    Breast cancer (HCC)    Moderate protein-calorie malnutrition (HCC)    Dysphagia    Vaginal discharge    Acute on chronic respiratory failure (HCC)    Encephalopathy acute    Seizure (HCC)    Sacroiliitis (HCC)    Primary osteoarthritis of both hips    Chronic low back pain without sciatica    Status post lumbar spinal fusion    Carotid artery stenosis    Continuous opioid dependence (HCC)    Iron deficiency anemia    Age-related osteoporosis without current pathological fracture    Acute deep vein thrombosis (DVT) of left upper extremity (HCC)    Hypomagnesemia    Drug-induced constipation    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)    Dependence on respirator (ventilator) status ([place] [address] )    Hyperparathyroidism, unspecified ([place] [address] )    Hypertension    Abscess, periappendiceal     Abscess, periappendiceal [K35 33]  Abdominal pain [R10 9]  ESRD on dialysis ([place] [address] ) [N18 6, Z99 2]      Subjective/Goals: patient did not state any specific goals this session    Vitals: stable     Pain: no pain reports    Treatment Time: 15 minutes total    Assessment:  Patient seen this date for OT with focus on goals as set by OTR  Patient agreeable to skilled OT session with focus on Education on safety, fall prevention and energy conservation techniques     Barriers to treatment include fatigue, volition, and safety Patient educated on safe functional transfers techniques, the appropriate use of AE/DME to improve functional performance, and activity modification techniques for energy conservation  Plans for d/c are home with home OT and family support  Patient is making gains toward OT goals with continued OT recommended at this time to max tolerance, safety and function for appropriate d/c planning  At end of session patient remains in bed with all needs within reach  Patient in bed upon arrival and refused functional activity  Daughter present and states that patient is to be a d/c tomorrow  PAGE talked of therapy goals and provided ed on general safety, fall prevention and energy conservation for increased patient participation in functional day to day tasks  Patient's daughter translated as needed  Patient and daughter both report feeling comfortable with patients present abilities in bathing, dressing and toileting and indicate no further questions and concerns other then her general medical with antibiotics  Patient did not want to get out of bed as she reports to daughter soiling self-- daughter reports that she will assist with clean up and declined therapy assist   It appears she has been experiencing some increase in loose stools with medications and per daughter "uncomfortable" with assistance  Daughter reports that upon d/c patient will rarely be left along       03/13/23 1417   OT Last Visit   OT Visit Date 03/13/23   Note Type   Note Type Treatment   Pain Assessment   Pain Assessment Tool 0-10   Pain Score No Pain   Restrictions/Precautions   Other Precautions Chair Alarm; Bed Alarm; Fall Risk;Cognitive   Lifestyle   Reciprocal Relationships daughter and family   Functional Standing Tolerance   Comments patient with IV infusing and refused functional activity   Bed Mobility   Additional Comments patient remains in bed for session as IV was infusing she leaking slightly making patient uncomfortable-- nurse left bedside at time of therapy arrival   Transfers   Additional Comments unable to assess this date-- discussed tranfers and general safety with daughter who was at bedside    daughter and patient both state that patient is walking without family issues or concerns (supervision)-- daughter reports family to be present and assist as needed   Assessment   Assessment see note   Plan   Treatment Interventions Patient/family training   Goal Expiration Date 03/25/23   OT Treatment Day 1   OT Frequency 2-3x/wk   Recommendation   OT Discharge Recommendation Home with home health rehabilitation     Jeremy Hayward  3/13/2023

## 2023-03-13 NOTE — ASSESSMENT & PLAN NOTE
Patient presented to the ED with her daughter following 3 weeks of post-prandial stomach pain and occasional vomiting  Daughter reports patient had appointment with GI soon, but for the last two days, the patient has not been able to tolerate any oral intake and so not able to keep down medications  Patient reports after eating pain 10/10 epigastric pain that radiates into right lower quadrant area  CT abdomen/pelvis demonstrating "3 4 cm rim-enhancing gas and fluid collection at the right lower quadrant adjacent to the appendiceal tip presumed to represent a periappendiceal abscess   Extensive mesenteric vessel disease as detailed above which is not significantly changed from prior exams "  Patient seen by surgery and felt to be high risk -> recommended IR drainage or medical treatment with IV antibiotics  IR feels it is not amenable to drainage at the time of admission -> repeat CT imaging yesterday noting decrease in size of abscess with conservative antibiotic treatment  Discussed with Infectious diseases -> plan to transition IV Zosyn to oral Augmentin for another 3 weeks with plan for repeat CT imaging in approximately 10 days (will followup outpatient w/ Infectious diseases)  With continued clinical improvement, anticipate discharge tomorrow

## 2023-03-13 NOTE — PROGRESS NOTES
[facility]  Progress Note Junious Sales 1940, 82 y o  female MRN: [id number]  Unit/Bed#: [address] 205-01 Encounter: [id number]  Primary Care Provider: Kelley Hernández DO   Date and time admitted to hospital: 3/9/2023 12:11 AM      * Periappendiceal abscess  Assessment & Plan  Patient presented to the ED with her daughter following 3 weeks of post-prandial stomach pain and occasional vomiting  Daughter reports patient had appointment with GI soon, but for the last two days, the patient has not been able to tolerate any oral intake and so not able to keep down medications  Patient reports after eating pain 10/10 epigastric pain that radiates into right lower quadrant area  CT abdomen/pelvis demonstrating "3 4 cm rim-enhancing gas and fluid collection at the right lower quadrant adjacent to the appendiceal tip presumed to represent a periappendiceal abscess   Extensive mesenteric vessel disease as detailed above which is not significantly changed from prior exams "  Patient seen by surgery and felt to be high risk -> recommended IR drainage or medical treatment with IV antibiotics  IR feels it is not amenable to drainage at the time of admission -> repeat CT imaging yesterday noting decrease in size of abscess with conservative antibiotic treatment  Discussed with Infectious diseases -> plan to transition IV Zosyn to oral Augmentin for another 3 weeks with plan for repeat CT imaging in approximately 10 days (will followup outpatient w/ Infectious diseases)  With continued clinical improvement, anticipate discharge tomorrow    ESRD on dialysis [facility])  Assessment & Plan  Lab Results   Component Value Date    EGFR 6 03/13/2023    EGFR 18 03/11/2023    EGFR 9 03/10/2023    CREATININE 5 51 (H) 03/13/2023    CREATININE 2 36 (H) 03/11/2023    CREATININE 4 24 (H) 03/10/2023     Patient currently on Monday, Wednesday, Friday HD schedule  Appreciate nephrology input    Chronic atrial fibrillation ([facility] [address] )  Assessment & Plan  Rate controlled on Toprol-XL  On Coumadin for anticoagulation    History of DVT (deep vein thrombosis)  Assessment & Plan  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 -> continue Coumadin for anticoagulation    Hypomagnesemia  Assessment & Plan  Monitor/replete serum magnesium  Serum potassium normal    Iron deficiency anemia  Assessment & Plan  Monitor H/H  Holding Epoetin in the setting of infection    Continuous opioid dependence [facility])  Assessment & Plan  Follows outpatient pain management in the setting of cancer related pain    Seizures ([facility] [address] )  Assessment & Plan  Continue Keppra    Breast cancer [facility])  Assessment & Plan  Patient with extensive history of ob/gyn related cancers  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/p radiation in 2018  Ovarian cancer s/p total abdominal hysterectomy  Followed outpatient by hem/onc, pain management    Type 2 diabetes mellitus   Assessment & Plan  Lab Results   Component Value Date    HGBA1C 5 7 09/01/2022     On SSI coverage per [employer] while hospitalized  Hypoglycemia protocol    Gastroesophageal reflux disease  Assessment & Plan  Continue PPI/Pepcid regimen      DVT Prophylaxis:  Coumadin    Patient Centered Rounds:  I have performed bedside rounds and discussed plan of care with nursing today  Discussions with Specialists or Other Care Team Provider:  see above assessments if applicable    Education and Discussions with Family / Patient:  Patient at bedside, w/ daughter over the phone to translate    Time Spent for Care:  32 minutes  More than 50% of total time spent on counseling and coordination of care as described above  Current Length of Stay: 4 day(s)  Current Patient Status: Inpatient   Certification Statement:  Patient will continue to require additional hospital stay due to assessments as noted above      Code Status: Level 1 - Full Code        Subjective:     Encountered earlier in the morning while undergoing dialysis  Remains weak/fatigued but denies abdominal pain at this time  Objective:     Vitals:   Temp (24hrs), Av 4 °F (36 9 °C), Min:98 °F (36 7 °C), Max:98 7 °F (37 1 °C)    Temp:  [98 °F (36 7 °C)-98 7 °F (37 1 °C)] 98 5 °F (36 9 °C)  HR:  [46-86] 69  Resp:  [16-20] 16  BP: ()/(37-63) 62/39  SpO2:  [91 %-100 %] 96 %  Body mass index is 24 98 kg/m²  Input and Output Summary (last 24 hours): Intake/Output Summary (Last 24 hours) at 3/13/2023 1615  Last data filed at 3/13/2023 0945  Gross per 24 hour   Intake 650 ml   Output --   Net 650 ml       Physical Exam:     GENERAL  weak/fatigued   HEAD   Normocephalic - atraumatic   EYES   PERRL - EOMI    MOUTH   Mucosa moist   NECK   Supple - full range of motion   CARDIAC  rate controlled - S1/S2 positive   PULMONARY    Clear breath sounds bilaterally - nonlabored respirations   ABDOMEN   Soft - currently nontender/nondistended - active bowel sounds   MUSCULOSKELETAL   Motor strength/range of motion mildly deconditioned   NEUROLOGIC   Alert/oriented at baseline   SKIN   Chronic wrinkles/blemishes    PSYCHIATRIC   Mood/affect stable         Additional Data:     Labs & Recent Cultures:    Results from last 7 days   Lab Units 23  0602   WBC Thousand/uL 5 86   HEMOGLOBIN g/dL 10 1*   HEMATOCRIT % 32 0*   PLATELETS Thousands/uL 228   NEUTROS PCT % 60   LYMPHS PCT % 26   MONOS PCT % 8   EOS PCT % 4     Results from last 7 days   Lab Units 23  0602 03/10/23  0600 23  0209   POTASSIUM mmol/L 3 5   < > 4 6   CHLORIDE mmol/L 107   < > 99   CO2 mmol/L 22   < > 31   BUN mg/dL 17   < > 12   CREATININE mg/dL 5 51*   < > 2 67*   CALCIUM mg/dL 8 8   < > 9 6   ALK PHOS U/L  --   --  138*   ALT U/L  --   --  8   AST U/L  --   --  11*    < > = values in this interval not displayed       Results from last 7 days   Lab Units 23  0602   INR  1 61*     Results from last 7 days   Lab Units 03/10/23  1747 03/10/23  1220 03/10/23  0554 03/10/23  0001 03/09/23  2052 03/09/23  1709 03/09/23  1250   POC GLUCOSE mg/dl 94 105 90 146* 138 104 110         Results from last 7 days   Lab Units 03/10/23  0600 03/09/23  0209   LACTIC ACID mmol/L  --  1 4   PROCALCITONIN ng/ml 0 56*  --          Results from last 7 days   Lab Units 03/09/23  1620   BLOOD CULTURE  No Growth at 72 hrs  No Growth at 72 hrs  Lines/Drains:  Invasive Devices     Peripheral Intravenous Line  Duration           Peripheral IV 03/10/23 Right;Ventral (anterior) Forearm 2 days          Hemodialysis Catheter  Duration           HD Permanent Double Catheter -- days                  Last 24 Hours Medication List:   Current Facility-Administered Medications   Medication Dose Route Frequency Provider Last Rate   • acetaminophen  650 mg Oral Q6H PRN Jake Face, PA-C     • amoxicillin-clavulanate  1 tablet Oral Q24H Kvng Staples MD     • atorvastatin  40 mg Oral QPM Jake Face, PA-C     • famotidine  20 mg Oral BID PRN Jake Face, PA-C     • HYDROmorphone  2 mg Intravenous Q4H PRN Jake Face, PA-C     • letrozole  2 5 mg Oral Daily Jake Face, PA-C     • levETIRAcetam  250 mg Oral Daily PRN Jake Face, PA-C     • levETIRAcetam  500 mg Oral Q12H [address] Jake Face, PA-C     • LORazepam  0 5 mg Oral Q8H PRN Jake Face, PA-C     • magnesium Oxide  400 mg Oral Daily Parrish Fields MD     • metoprolol succinate  25 mg Oral BID Jake Face, PA-C     • midodrine  5 mg Oral TID AC Lashonda Hernandez MD     • ondansetron  4 mg Intravenous Q6H PRN Jake Face, PA-C     • pantoprazole  20 mg Oral Early Morning Jake Face, PA-C     • saccharomyces boulardii  250 mg Oral BID Osiris Pedraza PA-C     • simethicone  80 mg Oral Daily Jake Face, PA-C     • warfarin  5 mg Oral Daily (warfarin) Linda Peters MD                            ** Please Note: This note is constructed using a voice recognition dictation system    An occasional wrong word/phrase or “sound-a-like” substitution may have been picked up by dictation device due to the inherent limitations of voice recognition software  Read the chart carefully and recognize, using reasonable context, where substitutions may have occurred  **

## 2023-03-14 ENCOUNTER — TELEPHONE (OUTPATIENT)
Dept: RADIOLOGY | Facility: CLINIC | Age: 83
End: 2023-03-14

## 2023-03-14 VITALS
RESPIRATION RATE: 16 BRPM | WEIGHT: 145.5 LBS | BODY MASS INDEX: 24.98 KG/M2 | OXYGEN SATURATION: 96 % | SYSTOLIC BLOOD PRESSURE: 90 MMHG | HEART RATE: 64 BPM | TEMPERATURE: 98.1 F | DIASTOLIC BLOOD PRESSURE: 39 MMHG

## 2023-03-14 PROBLEM — R56.9 SEIZURE (HCC): Status: RESOLVED | Noted: 2022-06-08 | Resolved: 2023-03-14

## 2023-03-14 LAB
ANION GAP SERPL CALCULATED.3IONS-SCNC: 9 MMOL/L (ref 4–13)
BUN SERPL-MCNC: 9 MG/DL (ref 5–25)
CALCIUM SERPL-MCNC: 8.8 MG/DL (ref 8.4–10.2)
CHLORIDE SERPL-SCNC: 106 MMOL/L (ref 96–108)
CO2 SERPL-SCNC: 22 MMOL/L (ref 21–32)
CREAT SERPL-MCNC: 3.15 MG/DL (ref 0.6–1.3)
GFR SERPL CREATININE-BSD FRML MDRD: 13 ML/MIN/1.73SQ M
GLUCOSE SERPL-MCNC: 105 MG/DL (ref 65–140)
MAGNESIUM SERPL-MCNC: 2.2 MG/DL (ref 1.9–2.7)
PHOSPHATE SERPL-MCNC: 2.4 MG/DL (ref 2.3–4.1)
POTASSIUM SERPL-SCNC: 5 MMOL/L (ref 3.5–5.3)
SODIUM SERPL-SCNC: 137 MMOL/L (ref 135–147)

## 2023-03-14 RX ORDER — MIDODRINE HYDROCHLORIDE 5 MG/1
5 TABLET ORAL
Qty: 90 TABLET | Refills: 0 | Status: SHIPPED | OUTPATIENT
Start: 2023-03-14 | End: 2023-04-13

## 2023-03-14 RX ORDER — AMOXICILLIN AND CLAVULANATE POTASSIUM 500; 125 MG/1; MG/1
1 TABLET, FILM COATED ORAL EVERY 24 HOURS
Qty: 30 TABLET | Refills: 0 | Status: SHIPPED | OUTPATIENT
Start: 2023-03-14 | End: 2023-04-13

## 2023-03-14 RX ADMIN — LEVETIRACETAM 500 MG: 100 SOLUTION ORAL at 10:11

## 2023-03-14 RX ADMIN — MIDODRINE HYDROCHLORIDE 5 MG: 5 TABLET ORAL at 05:46

## 2023-03-14 RX ADMIN — Medication 250 MG: at 10:11

## 2023-03-14 RX ADMIN — Medication 400 MG: at 10:11

## 2023-03-14 RX ADMIN — SIMETHICONE 80 MG: 80 TABLET, CHEWABLE ORAL at 10:11

## 2023-03-14 RX ADMIN — PANTOPRAZOLE SODIUM 20 MG: 20 TABLET, DELAYED RELEASE ORAL at 05:46

## 2023-03-14 RX ADMIN — LETROZOLE 2.5 MG: 2.5 TABLET, FILM COATED ORAL at 10:11

## 2023-03-14 RX ADMIN — MIDODRINE HYDROCHLORIDE 5 MG: 5 TABLET ORAL at 12:45

## 2023-03-14 NOTE — PROGRESS NOTES
NEPHROLOGY PROGRESS NOTE   Sun Jain 82 y o  female MRN: [id number]  Unit/Bed#: [address] 2 -01 Encounter: [id number]  Reason for Consult: ESRD    ASSESSMENT AND PLAN:  ESRD on HD on MWF schedule at [place] [address] HD well yesterday  Post HD weight 62 kg  Outpatient dry weight 61 kg       Access, currently has permacath placed     Borderline hypomagnesemia, improved with replacement, serum magnesium 2 2 today    -Occasionally PPI can cause hypomagnesemia  Consider discontinue if possible  -Currently on magnesium oxide, will discontinue this      Anemia in CKD  -Hemoglobin overall stable, goal hemoglobin 10-11  Transfuse as needed for hemoglobin less than 7  -Remains on Mircera 30 mcg every 4 weeks as outpatient  If hemoglobin dropping, consider Epogen while inpatient     CKD BMD, continue to monitor phosphorus, PTH  -Last serum phosphorus 2 4     Hypotension, blood pressure remains lower   -Now remains on midodrine 5 mg p o  3 times daily  Also on metoprolol  Continue to avoid amlodipine      Periappendiceal abscess, not accessible percutaneously as per IR   -Now remains on p o  Augmentin daily as per ID and plan to continue for at least 3 weeks with repeat CT scan and outpatient ID follow-up regarding final duration of antibiotic  Depending on CT scan results  -Repeat CT scan shows significantly decreased in the size of fluid and gas collection adjacent to appendiceal tip and several loops of small bowel      Discussed above plan in detail with primary team and they agree with above recommendations  Overall stable from renal standpoint for discharge    SUBJECTIVE:  Patient seen and examined at bedside  Denies chest pain, shortness of breath      OBJECTIVE:  Current Weight: Weight - Scale: 66 kg (145 lb 8 1 oz)  Vitals:    03/14/23 0702   BP: (!) 84/46   Pulse: 64   Resp: 18   Temp: 98 3 °F (36 8 °C)   SpO2: 95%       Intake/Output Summary (Last 24 hours) at 3/14/2023 1022  Last data filed at 3/14/2023 0601  Gross per 24 hour   Intake 300 ml   Output 2003 ml   Net -1703 ml     Wt Readings from Last 3 Encounters:   03/14/23 66 kg (145 lb 8 1 oz)   01/31/23 59 9 kg (132 lb)   01/09/23 60 kg (132 lb 4 4 oz)     Temp Readings from Last 3 Encounters:   03/14/23 98 3 °F (36 8 °C)   01/19/23 98 3 °F (36 8 °C) (Oral)   01/10/23 97 7 °F (36 5 °C) (Temporal)     BP Readings from Last 3 Encounters:   03/14/23 (!) 84/46   01/19/23 121/55   01/10/23 125/61     Pulse Readings from Last 3 Encounters:   03/14/23 64   01/31/23 72   01/19/23 62        Physical Examination:  Eyes: Conjunctival pallor present  Neck: No obvious lymphadenopathy appreciated  Respiratory: Bilateral air entry present  CVS: No significant edema in legs  GI: Soft, nondistended  CNS: Active, alert, follows commands  Skin: No new rash  Musculoskeletal: No obvious new gross deformity noted    Medications:    Current Facility-Administered Medications:   •  acetaminophen (TYLENOL) tablet 650 mg, 650 mg, Oral, Q6H PRN, Ayaz Gonzalez PA-C  •  amoxicillin-clavulanate (AUGMENTIN) 500-125 mg per tablet 1 tablet, 1 tablet, Oral, Q24H, Oliver Fowler MD, 1 tablet at 03/13/23 1721  •  atorvastatin (LIPITOR) tablet 40 mg, 40 mg, Oral, QPM, Ayaz Gonzalez PA-C, 40 mg at 03/13/23 1721  •  famotidine (PEPCID) tablet 20 mg, 20 mg, Oral, BID PRN, Ayaz Gonzalez PA-C, 20 mg at 03/13/23 2235  •  hydrocortisone (ANUSOL-HC) 2 5 % rectal cream, , Topical, 4x Daily PRN, Harjit Doherty MD  •  HYDROmorphone (DILAUDID) injection 2 mg, 2 mg, Intravenous, Q4H PRN, Ayaz Gonzalez PA-C, 2 mg at 03/09/23 2008  •  hydrOXYzine HCL (ATARAX) tablet 25 mg, 25 mg, Oral, Q6H PRN, Mallory Kim PA-C, 25 mg at 03/13/23 2137  •  letrozole [facility]) tablet 2 5 mg, 2 5 mg, Oral, Daily, Ayaz Gonzalez PA-C, 2 5 mg at 03/14/23 1011  •  levETIRAcetam (KEPPRA) oral solution 250 mg, 250 mg, Oral, Daily PRN, Ayaz Gonzalez PA-C  •  levETIRAcetam (KEPPRA) oral solution 500 mg, 500 mg, Oral, Q12H [address], Hortensia Earl PA-C, 500 mg at 03/14/23 1011  •  LORazepam (ATIVAN) tablet 0 5 mg, 0 5 mg, Oral, Q8H PRN, Hortensia Earl PA-C  •  magnesium Oxide (MAG-OX) tablet 400 mg, 400 mg, Oral, Daily, Linda Solares MD, 400 mg at 03/14/23 1011  •  metoprolol succinate (TOPROL-XL) 24 hr tablet 25 mg, 25 mg, Oral, BID, Hortensia Earl PA-C, 25 mg at 03/14/23 1011  •  midodrine (PROAMATINE) tablet 5 mg, 5 mg, Oral, TID AC, Nikkie Oswald MD, 5 mg at 03/14/23 0546  •  [facility] injection 4 mg, 4 mg, Intravenous, Q6H PRN, Hortensia Earl PA-C, 4 mg at 03/13/23 2137  •  pantoprazole (PROTONIX) EC tablet 20 mg, 20 mg, Oral, Early Morning, Hortensia Earl PA-C, 20 mg at 03/14/23 9815  •  saccharomyces boulardii (FLORASTOR) capsule 250 mg, 250 mg, Oral, BID, Osiris Pedraza PA-C, 250 mg at 03/14/23 1011  •  simethicone (MYLICON) chewable tablet 80 mg, 80 mg, Oral, Daily, Hortensia Earl PA-C, 80 mg at 03/14/23 1011  •  warfarin (COUMADIN) tablet 5 mg, 5 mg, Oral, Daily (warfarin), Marycruz Meza MD, 5 mg at 03/13/23 1721    Laboratory Results:  Results from last 7 days   Lab Units 03/14/23  0455 03/13/23  0602 03/11/23  1102 03/11/23  0632 03/10/23  0600 03/09/23  0209   WBC Thousand/uL  --  5 86 6 59 5 02 7 46 10 53*   HEMOGLOBIN g/dL  --  10 1* 9 4* 8 9* 9 1* 10 8*   HEMATOCRIT %  --  32 0* 31 2* 28 4* 28 9* 34 1*   PLATELETS Thousands/uL  --  228 215 189 190 212   SODIUM mmol/L 137 139  --  144 138 139   POTASSIUM mmol/L 5 0 3 5  --  3 8 4 1 4 6   CHLORIDE mmol/L 106 107  --  105 101 99   CO2 mmol/L 22 22  --  28 30 31   BUN mg/dL 9 17  --  7 22 12   CREATININE mg/dL 3 15* 5 51*  --  2 36* 4 24* 2 67*   CALCIUM mg/dL 8 8 8 8  --  8 2* 8 6 9 6   MAGNESIUM mg/dL 2 2 1 7*  --   --  1 7*  --    PHOSPHORUS mg/dL 2 4  --   --   --  3 4  --        CT abdomen pelvis wo contrast   Final Result by Travis Boles MD (03/12 7664)      1    Significantly decreased size of a fluid and gas collection in the right lower quadrant adjacent to the appendiceal tip and several loops of small bowel  No new collections  2   New/worsened pleural effusions  Workstation performed: [id number]         IR drainage tube placement   Final Result by Matthew Erickson MD (03/10 2908)   Abdominal abscess is not accessible percutaneously  Findings communicated to the patient's daughter and surgery service  Workstation performed: [id number]         CTA abdomen pelvis w wo contrast   Final Result by Derek Pearce MD (03/09 0407)      3 4 cm rim-enhancing gas and fluid collection at the right lower quadrant adjacent to the appendiceal tip presumed to represent a periappendiceal abscess  Extensive mesenteric vessel disease as detailed above which is not significantly changed from prior exams  Findings discussed with Dr Johanne Sierra at 12 AM, 3/9/2023         Workstation performed: [id number]         CT abdomen pelvis wo contrast    (Results Pending)       Portions of the record may have been created with voice recognition software  Occasional wrong word or "sound a like" substitutions may have occurred due to the inherent limitations of voice recognition software  Read the chart carefully and recognize, using context, where substitutions have occurred

## 2023-03-14 NOTE — DISCHARGE INSTR - AVS FIRST PAGE
Dear Jn Wells,     It was our pleasure to care for you here at [facility], [facility]  It is our hope that we were always able to exceed the expected standards for your care during your stay  You were hospitalized due to Appendical abscess  You were cared for on the 2nd floor by Yue Lyons DO with the [facility] who covers for your primary care physician (PCP), Sowmya Russell DO, while you were hospitalized  If you have any questions or concerns related to this hospitalization, you may contact us at [phone]  For follow up as well as any medication refills, we recommend that you follow up with your primary care physician  A registered nurse will reach out to you by phone within a few days after your discharge to answer any additional questions that you may have after going home  However, at this time we provide for you here, the most important instructions / recommendations at discharge:     Notable Medication Adjustments -   - will transition to PO Augmentin, dose will be 500 mg every 24 hours (taken after HD on HD days)  - would treat until radiographic resolution given lack of source control; would give script for 3 weeks of Augmentin (30 days provided) plan to repeat CT around 3/21 (non-con ok), then follow up in ID office after; if abscess resolved then can stop antibiotics  - follow up with Surgery as outpatient for definitive appendix management  Testing Required after Discharge -   CT scan 3/21/2023  Important follow up information -   Infectious disease follow-up, 3/21-3/22  Surgery follow-up in 3 to 4 weeks  PCP follow-up in a week    Other Instructions -   You have been discharged on Warfarin a medication that requires monitoring     For atrial fibrillation and DVT your INR values range between 2-3  At discharge you must arrange with your primary care physician, cardiologist, specialist, or anti-coagulation clinic appropriate follow up   -If you have any unexplained bleed, worst headache of your life, strike your head or any other body part in any manner, you need to go the Emergency room  Please do not participate in any high risk or contact sports while you are on a blood thinner  Please review this entire after visit summary as additional general instructions including medication list, appointments, activity, diet, any pertinent wound care, and other additional recommendations from your care team that may be provided for you        Sincerely,     Demond Blanca DO and Nurse Toribio Rodríguez

## 2023-03-14 NOTE — TELEPHONE ENCOUNTER
Message left for patient to return my call to find out if patient will be keeping her upcoming appointment because she is currently impatient and not sure if will be on antibiotics

## 2023-03-14 NOTE — PLAN OF CARE
Problem: MOBILITY - ADULT  Goal: Maintain or return to baseline ADL function  Description: INTERVENTIONS:  -  Assess patient's ability to carry out ADLs; assess patient's baseline for ADL function and identify physical deficits which impact ability to perform ADLs (bathing, care of mouth/teeth, toileting, grooming, dressing, etc )  - Assess/evaluate cause of self-care deficits   - Assess range of motion  - Assess patient's mobility; develop plan if impaired  - Assess patient's need for assistive devices and provide as appropriate  - Encourage maximum independence but intervene and supervise when necessary  - Involve family in performance of ADLs  - Assess for home care needs following discharge   - Consider OT consult to assist with ADL evaluation and planning for discharge  - Provide patient education as appropriate  Outcome: Adequate for Discharge  Goal: Maintains/Returns to pre admission functional level  Description: INTERVENTIONS:  - Perform BMAT or MOVE assessment daily    - Set and communicate daily mobility goal to care team and patient/family/caregiver  - Collaborate with rehabilitation services on mobility goals if consulted  - Perform Range of Motion 2 times a day  - Reposition patient every 2 hours    - Dangle patient 2 times a day  - Stand patient 2 times a day  - Ambulate patient 3 times a day  - Out of bed to chair 3 times a day   - Out of bed for meals 3 times a day  - Out of bed for toileting  - Record patient progress and toleration of activity level   Outcome: Adequate for Discharge     Problem: PAIN - ADULT  Goal: Verbalizes/displays adequate comfort level or baseline comfort level  Description: Interventions:  - Encourage patient to monitor pain and request assistance  - Assess pain using appropriate pain scale  - Administer analgesics based on type and severity of pain and evaluate response  - Implement non-pharmacological measures as appropriate and evaluate response  - Consider cultural and social influences on pain and pain management  - Notify physician/advanced practitioner if interventions unsuccessful or patient reports new pain  Outcome: Adequate for Discharge     Problem: INFECTION - ADULT  Goal: Absence or prevention of progression during hospitalization  Description: INTERVENTIONS:  - Assess and monitor for signs and symptoms of infection  - Monitor lab/diagnostic results  - Monitor all insertion sites, i e  indwelling lines, tubes, and drains  - Monitor endotracheal if appropriate and nasal secretions for changes in amount and color  - [place] appropriate cooling/warming therapies per order  - Administer medications as ordered  - Instruct and encourage patient and family to use good hand hygiene technique  - Identify and instruct in appropriate isolation precautions for identified infection/condition  Outcome: Adequate for Discharge  Goal: Absence of fever/infection during neutropenic period  Description: INTERVENTIONS:  - Monitor WBC    Outcome: Adequate for Discharge     Problem: SAFETY ADULT  Goal: Maintain or return to baseline ADL function  Description: INTERVENTIONS:  -  Assess patient's ability to carry out ADLs; assess patient's baseline for ADL function and identify physical deficits which impact ability to perform ADLs (bathing, care of mouth/teeth, toileting, grooming, dressing, etc )  - Assess/evaluate cause of self-care deficits   - Assess range of motion  - Assess patient's mobility; develop plan if impaired  - Assess patient's need for assistive devices and provide as appropriate  - Encourage maximum independence but intervene and supervise when necessary  - Involve family in performance of ADLs  - Assess for home care needs following discharge   - Consider OT consult to assist with ADL evaluation and planning for discharge  - Provide patient education as appropriate  Outcome: Adequate for Discharge  Goal: Maintains/Returns to pre admission functional level  Description: INTERVENTIONS:  - Perform BMAT or MOVE assessment daily    - Set and communicate daily mobility goal to care team and patient/family/caregiver  - Collaborate with rehabilitation services on mobility goals if consulted  - Perform Range of Motion 2 times a day  - Reposition patient every 2 hours    - Dangle patient 2 times a day  - Stand patient 2 times a day  - Ambulate patient 3 times a day  - Out of bed to chair 3 times a day   - Out of bed for meals 3 times a day  - Out of bed for toileting  - Record patient progress and toleration of activity level   Outcome: Adequate for Discharge  Goal: Patient will remain free of falls  Description: INTERVENTIONS:  -  Assess patient's ability to carry out ADLs; assess patient's baseline for ADL function and identify physical deficits which impact ability to perform ADLs (bathing, care of mouth/teeth, toileting, grooming, dressing, etc )  - Assess/evaluate cause of self-care deficits   - Assess range of motion  - Assess patient's mobility; develop plan if impaired  - Assess patient's need for assistive devices and provide as appropriate  - Encourage maximum independence but intervene and supervise when necessary  - Involve family in performance of ADLs  - Assess for home care needs following discharge   - Consider OT consult to assist with ADL evaluation and planning for discharge  - Provide patient education as appropriate  Outcome: Adequate for Discharge     Problem: DISCHARGE PLANNING  Goal: Discharge to home or other facility with appropriate resources  Description: INTERVENTIONS:  - Identify barriers to discharge w/patient and caregiver  - Arrange for needed discharge resources and transportation as appropriate  - Identify discharge learning needs (meds, wound care, etc )  - Arrange for interpretive services to assist at discharge as needed  - Refer to Case Management Department for coordinating discharge planning if the patient needs post-hospital services based on physician/advanced practitioner order or complex needs related to functional status, cognitive ability, or social support system  Outcome: Adequate for Discharge     Problem: Knowledge Deficit  Goal: Patient/family/caregiver demonstrates understanding of disease process, treatment plan, medications, and discharge instructions  Description: Complete learning assessment and assess knowledge base  Interventions:  - Provide teaching at level of understanding  - Provide teaching via preferred learning methods  Outcome: Adequate for Discharge     Problem: Prexisting or High Potential for Compromised Skin Integrity  Goal: Skin integrity is maintained or improved  Description: INTERVENTIONS:  - Identify patients at risk for skin breakdown  - Assess and monitor skin integrity  - Assess and monitor nutrition and hydration status  - Monitor labs   - Assess for incontinence   - Turn and reposition patient  - Assist with mobility/ambulation  - Relieve pressure over bony prominences  - Avoid friction and shearing  - Provide appropriate hygiene as needed including keeping skin clean and dry  - Evaluate need for skin moisturizer/barrier cream  - Collaborate with interdisciplinary team   - Patient/family teaching  - Consider wound care consult   Outcome: Adequate for Discharge     Problem: Nutrition/Hydration-ADULT  Goal: Nutrient/Hydration intake appropriate for improving, restoring or maintaining nutritional needs  Description: Monitor and assess patient's nutrition/hydration status for malnutrition  Collaborate with interdisciplinary team and initiate plan and interventions as ordered  Monitor patient's weight and dietary intake as ordered or per policy  Utilize nutrition screening tool and intervene as necessary  Determine patient's food preferences and provide high-protein, high-caloric foods as appropriate       INTERVENTIONS:  - Monitor oral intake, urinary output, labs, and treatment plans  - Assess nutrition and hydration status and recommend course of action  - Evaluate amount of meals eaten  - Assist patient with eating if necessary   - Allow adequate time for meals  - Recommend/ encourage appropriate diets, oral nutritional supplements, and vitamin/mineral supplements  - Order, calculate, and assess calorie counts as needed  - Recommend, monitor, and adjust tube feedings and TPN/PPN based on assessed needs  - Assess need for intravenous fluids  - Provide specific nutrition/hydration education as appropriate  - Include patient/family/caregiver in decisions related to nutrition  Outcome: Adequate for Discharge     Problem: METABOLIC, FLUID AND ELECTROLYTES - ADULT  Goal: Electrolytes maintained within normal limits  Description: INTERVENTIONS:  - Monitor labs and assess patient for signs and symptoms of electrolyte imbalances  - Administer electrolyte replacement as ordered  - Monitor response to electrolyte replacements, including repeat lab results as appropriate  - Instruct patient on fluid and nutrition as appropriate  Outcome: Adequate for Discharge  Goal: Fluid balance maintained  Description: INTERVENTIONS:  - Monitor labs   - Monitor I/O and WT  - Instruct patient on fluid and nutrition as appropriate  - Assess for signs & symptoms of volume excess or deficit  Outcome: Adequate for Discharge

## 2023-03-14 NOTE — ASSESSMENT & PLAN NOTE
Per ID    " Appendiceal abscess:  - Patient with about 3 weeks of abdominal pains, found on CT 3/9 to have a 3 4 cm rim-enhancing gas and fluid collection in RLQ, adjacent to appendix  Abdominal exam benign, no clinical sings of peritonitis  IR does not feel can access collection for percutaneous drainage  Repeat CT on 3/12 showed decreasing size, now 1 8 X 1 6 X 2 1 cm  No interventions planned per surgery, recommending discharge on PO antibiotics       - will transition to PO Augmentin, dose will be 500 mg every 24 hours (taken after HD on HD days)  - would treat until radiographic resolution given lack of source control; would give script for 3 weeks of Augmentin, plan to repeat CT around 3/21 (non-con ok), then follow up in ID office after; if abscess resolved then can stop antibiotics  - follow up with Surgery as outpatient for definitive appendix management"

## 2023-03-14 NOTE — ASSESSMENT & PLAN NOTE
Rate controlled on Toprol-XL  On Coumadin for anticoagulation  INR subtherapeutic, will need outpatient follow-up for Coumadin management

## 2023-03-14 NOTE — DISCHARGE SUMMARY
[address] [facility]  Discharge- Darrell Tapia 1940, 82 y o  female MRN: [id number]  Unit/Bed#: [facility] 68 2 -01 Encounter: [id number]  Primary Care Provider: Leonila Jean DO   Date and time admitted to hospital: 3/9/2023 12:11 AM    Admitting Provider:  Isis Santana DO  Discharge Provider:  Jia Burroughs DO  Admission Date: 3/9/2023       Discharge Date: 03/14/23   LOS: 5  Primary Care Physician at Discharge: Leonila Jean, [address]  COURSE:  Darrell Tapia is a 82 y o  female who presented abdominal pain  The patient was found to have epidural abscess  She had prior to admission 3 weeks of abdominal pain  CT scan demonstrated a 3 4 cm rim-enhancing gas and fluid collection in the right lower quadrant adjacent to the appendix  General surgery as well as interventional radiology consultation was placed  Unfortunately they could not perform percutaneous drainage  The patient was started on intravenous antibiotics, she was evaluated in consultation by the infectious disease service  [place] imaging showed decreasing fluid collection size  Due to previous abdominal surgeries as well as rapid improvement it was recommended that the patient continue on oral antibiotics with plans to follow-up with repeat imaging in 1 week  The patient will follow up with infectious disease and if abscess is resolved can stop further antibiotics  The patient did receive hemodialysis while admitted, she had a left IJ permacath in place  Her antibiotics were renally dosed  At the time of discharge the patient was tolerating oral diet without acute complaint was medically optimized for discharge home    All questions were answered the patient's satisfaction and discussed with the patient's granddaughter who was in agreement with the discharge plan    DISCHARGE DIAGNOSES  * Periappendiceal abscess  Assessment & Plan  Per ID    " Appendiceal abscess:  - Patient with about 3 weeks of abdominal pains, found on CT 3/9 to have a 3 4 cm rim-enhancing gas and fluid collection in RLQ, adjacent to appendix  Abdominal exam benign, no clinical sings of peritonitis  IR does not feel can access collection for percutaneous drainage  Repeat CT on 3/12 showed decreasing size, now 1 8 X 1 6 X 2 1 cm  No interventions planned per surgery, recommending discharge on PO antibiotics       - will transition to PO Augmentin, dose will be 500 mg every 24 hours (taken after HD on HD days)  - would treat until radiographic resolution given lack of source control; would give script for 3 weeks of Augmentin, plan to repeat CT around 3/21 (non-con ok), then follow up in ID office after; if abscess resolved then can stop antibiotics  - follow up with Surgery as outpatient for definitive appendix management"    Hypomagnesemia  Assessment & Plan  Monitor/replete serum magnesium  Serum potassium normal    Iron deficiency anemia  Assessment & Plan  Monitor H/H  Holding Epoetin in the setting of infection    Continuous opioid dependence [facility])  Assessment & Plan  Follows outpatient pain management in the setting of cancer related pain    Breast cancer [facility])  Assessment & Plan  Patient with extensive history of ob/gyn related cancers  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/p radiation in 2018  Ovarian cancer s/p total abdominal hysterectomy  Followed outpatient by hem/onc, pain management  Continue previous letrozole    History of DVT (deep vein thrombosis)  Assessment & Plan  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 -> continue Coumadin for anticoagulation    Gastroesophageal reflux disease  Assessment & Plan  Continue PPI/Pepcid regimen    ESRD on dialysis [facility])  Assessment & Plan  Lab Results   Component Value Date    EGFR 13 03/14/2023    EGFR 6 03/13/2023    EGFR 18 03/11/2023    CREATININE 3 15 (H) 03/14/2023    CREATININE 5 51 (H) 03/13/2023    CREATININE 2 36 (H) 03/11/2023     Patient currently on Monday, Wednesday, Friday HD schedule  Appreciate nephrology input    Chronic atrial fibrillation [facility])  Assessment & Plan  Rate controlled on Toprol-XL  On Coumadin for anticoagulation  INR subtherapeutic, will need outpatient follow-up for Coumadin management    Seizures (HCC)-resolved as of 3/14/2023  Assessment & Plan  Continue Keppra      CONSULTING PROVIDERS   IP CONSULT TO ACUTE CARE SURGERY  IP CONSULT TO NEPHROLOGY  INPATIENT CONSULT TO IR  IP CONSULT TO INFECTIOUS DISEASES  IP CONSULT TO NEPHROLOGY    PROCEDURES PERFORMED  * No surgery found *    RADIOLOGY RESULTS  CT abdomen pelvis wo contrast    Result Date: 3/12/2023    Impression: 1  Significantly decreased size of a fluid and gas collection in the right lower quadrant adjacent to the appendiceal tip and several loops of small bowel  No new collections  2   New/worsened pleural effusions  CTA abdomen pelvis w wo contrast    Result Date: 3/9/2023    Impression: 3 4 cm rim-enhancing gas and fluid collection at the right lower quadrant adjacent to the appendiceal tip presumed to represent a periappendiceal abscess  Extensive mesenteric vessel disease as detailed above which is not significantly changed from prior exams  IR drainage tube placement    Impression: Abdominal abscess is not accessible percutaneously  Findings communicated to the patient's daughter and surgery service        LABS  Results from last 7 days   Lab Units 03/13/23  0602 03/11/23  1102 03/11/23  0632 03/10/23  0600 03/09/23  1619 03/09/23  0530 03/09/23  0209   WBC Thousand/uL 5 86 6 59 5 02 7 46  --   --  10 53*   HEMOGLOBIN g/dL 10 1* 9 4* 8 9* 9 1*  --   --  10 8*   HEMATOCRIT % 32 0* 31 2* 28 4* 28 9*  --   --  34 1*   MCV fL 97 102* 99* 99*  --   --  98   PLATELETS Thousands/uL 228 215 189 190  --   --  212   INR  1 61*  --  1 38*  --  1 29* 3 13*  --      Results from last 7 days   Lab Units 03/14/23  0455 03/13/23  0602 03/11/23  0632 03/10/23  0600 03/09/23  0209   SODIUM mmol/L 137 139 144 138 139   POTASSIUM mmol/L 5 0 3 5 3 8 4 1 4 6   CHLORIDE mmol/L 106 107 105 101 99   CO2 mmol/L 22 22 28 30 31   BUN mg/dL 9 17 7 22 12   CREATININE mg/dL 3 15* 5 51* 2 36* 4 24* 2 67*   CALCIUM mg/dL 8 8 8 8 8 2* 8 6 9 6   ALBUMIN g/dL  --   --   --   --  3 8   TOTAL BILIRUBIN mg/dL  --   --   --   --  0 49   ALK PHOS U/L  --   --   --   --  138*   ALT U/L  --   --   --   --  8   AST U/L  --   --   --   --  11*   EGFR ml/min/1 73sq m 13 6 18 9 16   GLUCOSE RANDOM mg/dL 105 82 93 87 145*                  Results from last 7 days   Lab Units 03/10/23  1747 03/10/23  1220 03/10/23  0554 03/10/23  0001 03/09/23  2052 03/09/23  1709 03/09/23  1250   POC GLUCOSE mg/dl 94 105 90 146* 138 104 110             Results from last 7 days   Lab Units 03/10/23  0600 03/09/23  0209   LACTIC ACID mmol/L  --  1 4   PROCALCITONIN ng/ml 0 56*  --            Cultures:         Invalid input(s): URIBILINOGEN        Results from last 7 days   Lab Units 03/13/23  2312 03/09/23  1620 03/09/23  0809   BLOOD CULTURE   --  No Growth After 4 Days  No Growth After 4 Days    --    INFLUENZA A PCR  Negative  --  Negative       PHYSICAL EXAM:  Vitals:   Blood Pressure: (!) 90/39 (03/14/23 1052)  Pulse: 64 (03/14/23 1052)  Temperature: 98 1 °F (36 7 °C) (03/14/23 1052)  Temp Source: Oral (03/13/23 2104)  Respirations: 16 (03/14/23 1052)  Weight - Scale: 66 kg (145 lb 8 1 oz) (03/14/23 0600)  SpO2: 96 % (03/14/23 1052)      General: well appearing, no acute distress  HEENT: atraumatic, PERRLA, moist mucosa, normal pharynx, normal tonsils and adenoids, normal tongue, no fluid in sinuses  Neck: Trachea midline, no carotid bruit, no masses  Respiratory: normal chest wall expansion, CTA B, no r/r/w, no rubs  Cardiovascular: RRR, no m/r/g, Normal S1 and S2  Abdomen: Soft, non-tender, non-distended, normal bowel sounds in all quadrants, no hepatosplenomegaly, no tympany  Rectal: deferred  Musculoskeletal: normal ROM in upper and lower extremities  Integumentary: warm, dry, and pink, with no rash, purpura, or petechia  Heme/Lymph: no lymphadenopathy, no bruises  Neurological: Cranial Nerves II-XII grossly intact, no tics, normal sensation to pressure and light touch  Psychiatric: cooperative with normal mood, affect, and cognition      Discharge Disposition: Home/Self Care      Test Results Pending at Discharge:   Pending Labs     Order Current Status    Blood culture Preliminary result    Blood culture Preliminary result              Medications   · Summary of Medication Adjustments made as a result of this hospitalization: See discharge list  · Medication Dosing Tapers - Please refer to Discharge Medication List for details on any medication dosing tapers (if applicable to patient)  · Discharge Medication List: See after visit summary for reconciled discharge medications  Diet restrictions:         Diet Orders   (From admission, onward)             Start     Ordered    03/11/23 0742  Diet Surgical; Surgical Soft/Lite Meal  Diet effective now        References:    Nutrtion Support Algorithm Enteral vs  Parenteral   Question Answer Comment   Diet Type Surgical    Surgical Surgical Soft/Lite Meal    RD to adjust diet per protocol? Yes        03/11/23 0742              Activity restrictions: No strenuous activity  Discharge Condition: fair    Outpatient Follow-Up and Discharge Instructions  See after visit summary section titled Discharge Instructions for information provided to patient and family  Code Status: Level 1 - Full Code  Discharge Statement   I spent 35 minutes discharging the patient  This time was spent on the day of discharge  Greater than 50% of total time was spent with the patient and / or family counseling and / or coordination of care  ** Please Note: This note was completed in part utilizing [employer] Direct Software    Grammatical errors, random word insertions, spelling mistakes, and incomplete sentences may be an occasional consequence of this system secondary to software limitations, ambient noise, and hardware issues  If you have any questions or concerns about the content, text, or information contained within the body of this dictation, please contact the provider for clarification  **

## 2023-03-14 NOTE — ASSESSMENT & PLAN NOTE
Lab Results   Component Value Date    EGFR 13 03/14/2023    EGFR 6 03/13/2023    EGFR 18 03/11/2023    CREATININE 3 15 (H) 03/14/2023    CREATININE 5 51 (H) 03/13/2023    CREATININE 2 36 (H) 03/11/2023     Patient currently on Monday, Wednesday, Friday HD schedule  Appreciate nephrology input

## 2023-03-14 NOTE — NURSING NOTE
Reviewed discharge instructions with Isaac Eduardo, patient's grand daughter and caregiver  She verbalized understanding and had no questions

## 2023-03-14 NOTE — ASSESSMENT & PLAN NOTE
Patient with extensive history of ob/gyn related cancers  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/p radiation in 2018  Ovarian cancer s/p total abdominal hysterectomy  Followed outpatient by hem/onc, pain management  Continue previous letrozole

## 2023-03-14 NOTE — CASE MANAGEMENT
Case Management Discharge Planning Note    Patient name [facility] Location [place] [address] Milagros Gains* MRN [id number]  : 1940 Date 3/14/2023       Current Admission Date: 3/9/2023  Current Admission Diagnosis:Periappendiceal abscess   Patient Active Problem List    Diagnosis Date Noted   • Periappendiceal abscess 2023   • Dependence on respirator (ventilator) status ([facility] [address] ) 2023   • Hyperparathyroidism, unspecified [facility] ) 2023   • Hypertension    • 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 ([facility] [address] ) 2022   • Drug-induced constipation 2022   • Hypomagnesemia 2022   • Acute deep vein thrombosis (DVT) of left upper extremity ([facility] [address] ) 2022   • Continuous opioid dependence ([facility] ) 2022   • Iron deficiency anemia 2022   • Age-related osteoporosis without current pathological fracture 2022   • Carotid artery stenosis 2022   • Sacroiliitis ([facility] [address] ) 06/10/2022   • Primary osteoarthritis of both hips 06/10/2022   • Chronic low back pain without sciatica 06/10/2022   • Status post lumbar spinal fusion 06/10/2022   • Encephalopathy acute 2022   • Acute on chronic respiratory failure ([facility] [address] ) 2022   • Vaginal discharge 2022   • Moderate protein-calorie malnutrition ([facility] ) 2022   • Dysphagia 2022   • Left arm swelling 2022   • Breast cancer ([facility] ) 2022   • History of DVT (deep vein thrombosis) 2022   • Pleural effusion 2022   • Ambulatory dysfunction 2020   • Chronic atrial fibrillation ([facility] [address] ) 2017   • Coronary artery disease involving native heart without angina pectoris 2017   • Gastroesophageal reflux disease 2017   • ESRD on dialysis ([facility] [address] ) 2016   • Acute on chronic diastolic heart failure ([facility] ) 2015   • Mixed hyperlipidemia 2011   • Gout 2011      LOS (days): 5  Geometric Mean LOS (GMLOS) (days): 5 20  Days to GMLOS:-0 1     OBJECTIVE:  Risk of Unplanned Readmission Score: 49 41         Current admission status: Inpatient   Preferred Pharmacy:   [facility], [place] - [address]  [address]  [facility] [address]  Phone: [phone] Fax: [phone]    Primary Care Provider: Bobby Lee DO    Primary Insurance: MEDICARE  Secondary Insurance: [id number] DETAILS:    Other Referral/Resources/Interventions Provided:  Referral Comments: AVS faxed to [facility] [phone] as requested by Efrain Snow

## 2023-03-15 DIAGNOSIS — Z71.89 COMPLEX CARE COORDINATION: Primary | ICD-10-CM

## 2023-03-15 LAB
BACTERIA BLD CULT: NORMAL
BACTERIA BLD CULT: NORMAL

## 2023-03-15 NOTE — TELEPHONE ENCOUNTER
Caller: Patient  Doctor: Marley Quigley    Reason for call: pt is on augmentin for 10 days and has been on it for 1 week now       Call back#: Page Goff (daughter ) [phone]

## 2023-03-15 NOTE — TELEPHONE ENCOUNTER
Caller: Vasyl Kunz     Doctor: Iona Person     Reason for call: patients daughter returning your phone call     Call back#: [phone]

## 2023-03-16 ENCOUNTER — APPOINTMENT (OUTPATIENT)
Dept: RADIOLOGY | Facility: MEDICAL CENTER | Age: 83
End: 2023-03-16

## 2023-03-16 ENCOUNTER — TELEPHONE (OUTPATIENT)
Dept: INFECTIOUS DISEASES | Facility: CLINIC | Age: 83
End: 2023-03-16

## 2023-03-16 ENCOUNTER — OFFICE VISIT (OUTPATIENT)
Dept: FAMILY MEDICINE CLINIC | Facility: CLINIC | Age: 83
End: 2023-03-16

## 2023-03-16 ENCOUNTER — TELEPHONE (OUTPATIENT)
Dept: FAMILY MEDICINE CLINIC | Facility: CLINIC | Age: 83
End: 2023-03-16

## 2023-03-16 ENCOUNTER — TRANSITIONAL CARE MANAGEMENT (OUTPATIENT)
Dept: FAMILY MEDICINE CLINIC | Facility: CLINIC | Age: 83
End: 2023-03-16

## 2023-03-16 VITALS
BODY MASS INDEX: 24.75 KG/M2 | HEART RATE: 72 BPM | SYSTOLIC BLOOD PRESSURE: 100 MMHG | HEIGHT: 64 IN | TEMPERATURE: 98.5 F | DIASTOLIC BLOOD PRESSURE: 60 MMHG | OXYGEN SATURATION: 97 % | WEIGHT: 145 LBS | RESPIRATION RATE: 14 BRPM

## 2023-03-16 DIAGNOSIS — C50.912 CHEST WALL RECURRENCE OF LEFT BREAST CANCER (HCC): ICD-10-CM

## 2023-03-16 DIAGNOSIS — R07.81 RIB PAIN: ICD-10-CM

## 2023-03-16 DIAGNOSIS — K35.33 ABSCESS, PERIAPPENDICEAL: ICD-10-CM

## 2023-03-16 DIAGNOSIS — C79.89 CHEST WALL RECURRENCE OF LEFT BREAST CANCER (HCC): ICD-10-CM

## 2023-03-16 DIAGNOSIS — I27.82 OTHER CHRONIC PULMONARY EMBOLISM WITHOUT ACUTE COR PULMONALE (HCC): Primary | ICD-10-CM

## 2023-03-16 RX ORDER — POLYETHYLENE GLYCOL 3350 17 G/17G
17 POWDER, FOR SOLUTION ORAL DAILY
COMMUNITY

## 2023-03-16 RX ORDER — HYDROMORPHONE HYDROCHLORIDE 2 MG/1
2 TABLET ORAL 2 TIMES DAILY PRN
COMMUNITY

## 2023-03-16 NOTE — ASSESSMENT & PLAN NOTE
Discussed antibiotic dosing today will remain on antibiotics seems to be improving overall, will schedule follow-up CT scan patient's daughter asked to call here for assistance if unable to have scan next week, she had a result for a week so will need to scan and medications prior to leaving for trip  Maintain on Augmentin until this time patient asked to seek medical care right away if any worsening abdominal pains fevers nausea or vomiting      Patient will follow-up with infectious disease and possibly surgery to discuss further management

## 2023-03-16 NOTE — PATIENT INSTRUCTIONS
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

## 2023-03-16 NOTE — PROGRESS NOTES
TCM Call     Date and time call was made  3/16/2023  3:39 PM    Hospital care reviewed  Records reviewed    Patient was hospitialized at  [address]    Date of Admission  03/09/23    Date of discharge  03/14/23    Diagnosis  Abscess, Periappendiceal    Disposition  Home    Were the patients medications reviewed and updated  Yes    Current Symptoms  None      TCM Call     Post hospital issues  None    Should patient be enrolled in anticoag monitoring? Yes    Scheduled for follow up? Yes  3 16 23 Dr Mix Book    Did you obtain your prescribed medications  Yes    Do you need help managing your prescriptions or medications  No    Is transportation to your appointment needed  No    I have advised the patient to call PCP with any new or worsening symptoms  CELESTINO Queen          Assessment/Plan:       Problem List Items Addressed This Visit        Digestive    Periappendiceal abscess     Discussed antibiotic dosing today will remain on antibiotics seems to be improving overall, will schedule follow-up CT scan patient's daughter asked to call here for assistance if unable to have scan next week, she had a result for a week so will need to scan and medications prior to leaving for trip  Maintain on Augmentin until this time patient asked to seek medical care right away if any worsening abdominal pains fevers nausea or vomiting  Patient will follow-up with infectious disease and possibly surgery to discuss further management            Cardiovascular and Mediastinum    Other chronic pulmonary embolism without acute cor pulmonale ([address] ) - Primary       Other    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 ([facility] [address] )     Continue outpatient follow-up         Rib pain     Likely contusion secondary to Heimlich maneuver when choking we will check x-ray, today tender over been normal abdominal exam         Relevant Orders    XR chest pa & lateral         Subjective:      Patient ID: Ayse Cash is a 82 y o  female  HPI     82year old female with ESRD, presenting in follow up from hospitalization  Had appendicitis/abscess, treated medically, was not candidate for drain/surgery  Has CT scan scheduled for 21st     Clarified medications today, was using Augmentin 2x per day on some days, will use once per day until CT scan and has follow up with ID    Did have one episode of aspiration, has increased pain, daughter did heimlich which resolved symptoms, has had rib pain since  The following portions of the patient's history were reviewed and updated as appropriate: allergies, current medications, past family history, past medical history, past social history, past surgical history and problem list       Current Outpatient Medications:   •  acetaminophen (TYLENOL) 500 mg tablet, Take 500 mg by mouth every 6 (six) hours as needed, Disp: , Rfl:   •  amoxicillin-clavulanate (AUGMENTIN) 500-125 mg per tablet, Take 1 tablet by mouth every 24 hours, Disp: 30 tablet, Rfl: 0  •  atorvastatin (LIPITOR) 40 mg tablet, Take 1 tablet (40 mg total) by mouth every evening, Disp: 90 tablet, Rfl: 1  •  Diclofenac Sodium (VOLTAREN) 1 %, Apply 2 g topically 4 (four) times a day as needed, Disp: , Rfl:   •  famotidine (PEPCID) 20 mg tablet, Take 1 tablet (20 mg total) by mouth 2 (two) times a day as needed (abdominal pain), Disp: 60 tablet, Rfl: 11  •  HYDROmorphone (DILAUDID) 2 mg tablet, Take 2 mg by mouth 2 (two) times a day as needed, Disp: , Rfl:   •  hydrOXYzine HCL (ATARAX) 25 mg tablet, Take 25 mg by mouth Three times daily as needed, Disp: , Rfl:   •  letrozole (FEMARA) 2 5 mg tablet, Take 2 5 mg by mouth in the morning, Disp: , Rfl:   •  levETIRAcetam (KEPPRA) 100 mg/mL oral solution, 5mL by mouth every 12 hours  On dialysis days only, take an additional 2 5mL after dialysis  , Disp: 473 mL, Rfl: 3  •  [address] 72 MCG CAPS, Take 1 capsule by mouth daily, Disp: , Rfl:   •  LORazepam (ATIVAN) 0 5 mg tablet, Take 0 5 mg by mouth every 8 (eight) hours as needed, Disp: , Rfl:   •  metoprolol succinate (TOPROL-XL) 25 mg 24 hr tablet, Take 1 tablet (25 mg total) by mouth 2 (two) times a day, Disp: 180 tablet, Rfl: 1  •  midodrine (PROAMATINE) 5 mg tablet, Take 1 tablet (5 mg total) by mouth 3 (three) times a day before meals, Disp: 90 tablet, Rfl: 0  •  Multiple Vitamins-Minerals (ProRenal + D) TABS, Take 1 tablet by mouth every evening  Indications: RENAL FAILURE, CHRONIC, Disp: , Rfl:   •  nitroglycerin (NITROSTAT) 0 4 mg SL tablet, Place 0 4 mg under the tongue, Disp: , Rfl:   •  nystatin powder, APPLY TO AFFECTED AREA(S) TWICE A DAY, Disp: , Rfl:   •  ondansetron (ZOFRAN) 4 mg tablet, Take 1 tablet (4 mg total) by mouth every 8 (eight) hours as needed for nausea or vomiting, Disp: 20 tablet, Rfl: 0  •  pantoprazole (PROTONIX) 20 mg tablet, Take 1 tablet (20 mg total) by mouth daily, Disp: 60 tablet, Rfl: 11  •  polyethylene glycol (GLYCOLAX) 17 GM/SCOOP powder, Take 17 g by mouth daily, Disp: , Rfl:   •  simethicone (MYLICON) 80 mg chewable tablet, Chew 1 tab po once daily, Disp: 90 tablet, Rfl: 3  •  warfarin (COUMADIN) 5 mg tablet, Take 1 tablet (5 mg total) by mouth daily, Disp: 30 tablet, Rfl: 0     Review of Systems   Constitutional: Negative for activity change and appetite change  Respiratory: Negative for apnea, chest tightness and shortness of breath  Cardiovascular: Negative for chest pain and palpitations  Gastrointestinal: Negative for abdominal distention and abdominal pain  Musculoskeletal: Negative for arthralgias and back pain  Objective:      /60 (BP Location: Left leg, Cuff Size: Standard)   Pulse 72   Temp 98 5 °F (36 9 °C) (Tympanic)   Resp 14   Ht 5' 4" (1 626 m)   Wt 65 8 kg (145 lb)   SpO2 97%   BMI 24 89 kg/m²          Physical Exam  Constitutional:       Appearance: Normal appearance  Cardiovascular:      Rate and Rhythm: Normal rate and regular rhythm        Comments: Tender over 11th 12 rib right side  Pulmonary:      Effort: Pulmonary effort is normal    Abdominal:      General: Abdomen is flat  Tenderness: There is no abdominal tenderness  Musculoskeletal:      Comments: In wheelchair   Neurological:      General: No focal deficit present  Mental Status: She is alert             Nicole Adam MD

## 2023-03-16 NOTE — TELEPHONE ENCOUNTER
called pt  with a reminder to get her ct scheduled asap so they are resulted by her appt date  Spoke with pt daughter Herb Bigger      Herb Bigger explains she will get CT asap and confirms appt

## 2023-03-16 NOTE — ASSESSMENT & PLAN NOTE
Likely contusion secondary to Heimlich maneuver when choking we will check x-ray, today tender over been normal abdominal exam

## 2023-03-17 ENCOUNTER — PATIENT OUTREACH (OUTPATIENT)
Dept: FAMILY MEDICINE CLINIC | Facility: CLINIC | Age: 83
End: 2023-03-17

## 2023-03-17 NOTE — PROGRESS NOTES
HRR referral     Upon chart review noted patient will be traveling out of country for three months with family, leaving on 3/23  Will close referral at this time

## 2023-03-20 NOTE — TELEPHONE ENCOUNTER
cx patient upcoming appts she is on an abx per daughter- she had a ruptured appendix -no sx treating with abx    They will cb to r/s

## 2023-03-21 ENCOUNTER — HOSPITAL ENCOUNTER (OUTPATIENT)
Dept: CT IMAGING | Facility: HOSPITAL | Age: 83
Discharge: HOME | End: 2023-03-21
Attending: INTERNAL MEDICINE

## 2023-03-21 DIAGNOSIS — K35.33 ABSCESS, PERIAPPENDICEAL: ICD-10-CM

## 2023-03-22 DIAGNOSIS — I82.622 ACUTE DEEP VEIN THROMBOSIS (DVT) OF LEFT UPPER EXTREMITY, UNSPECIFIED VEIN (HCC): ICD-10-CM

## 2023-03-22 RX ORDER — WARFARIN SODIUM 5 MG/1
TABLET ORAL
Qty: 30 TABLET | Refills: 0 | Status: SHIPPED | OUTPATIENT
Start: 2023-03-22

## 2023-03-23 ENCOUNTER — TELEPHONE (OUTPATIENT)
Dept: PULMONOLOGY | Facility: CLINIC | Age: 83
End: 2023-03-23

## 2023-03-23 NOTE — TELEPHONE ENCOUNTER
Spoke with patient's daughter in regards to scheduling a 6 month follow up at our [facility] office with Dr Boy Villegas or CHELITA in June but patient stated she is not ready to schedule & will call us back as needed for a follow up

## 2023-03-30 ENCOUNTER — TELEPHONE (OUTPATIENT)
Dept: INFECTIOUS DISEASES | Facility: CLINIC | Age: 83
End: 2023-03-30

## 2023-03-30 NOTE — TELEPHONE ENCOUNTER
Patient's daughter calls back  Oralia Matters is in [facility]  She doesn't know when she will return  She is still taking the antibiotics and stated she spoke with a doctor about the cat scan and that they will repeat the ct scan after patient completes the antibiotics  She doesn't have further information about this, but cannot reschedule without knowing when patient will return from [facility]

## 2023-03-30 NOTE — TELEPHONE ENCOUNTER
LM for pt to CB  We think she may have accidentally cancelled her appt via automated reminder  Patient on abx, with CT which needs to be gone over  R/S patient to same appt, LM for CB  Should carry out appt today to f/u CT to decide what to do w/ Abx

## 2023-04-05 NOTE — TELEPHONE ENCOUNTER
S/w pt's daughter, Jamel Davalos, as per NEDRA. Pts daughter asking about what meds JW uses for pt when he does inj. Pt currently in [facility] w/ family and wanting to have inj there. According to daughter medical providers in [facility] needed to know meds used prev for pts pain mngt. RN provided info for b/l IA hip inj and b/l SIJ inj meds (1% lido, 40mg dep-medrol and 0.25% bupivicaine).  Pts daughter verbalized understanding and appreciative of c/b.

## 2023-04-05 NOTE — TELEPHONE ENCOUNTER
Caller: stevan, daughter    Doctor: Kendy Darling    Reason for call: needs to know the names of the medication injections that were given    Call back#: [phone]

## 2023-06-11 DIAGNOSIS — K59.00 CONSTIPATION, UNSPECIFIED CONSTIPATION TYPE: ICD-10-CM

## 2023-06-12 NOTE — TELEPHONE ENCOUNTER
Please call pt's dtr  There is a refill request for senna  But it is no longer listed on med list   Also - not sure if pt is still seeing me?

## 2023-06-19 RX ORDER — ASPIRIN 81 MG
TABLET, DELAYED RELEASE (ENTERIC COATED) ORAL
Qty: 30 TABLET | Refills: 1 | Status: SHIPPED | OUTPATIENT
Start: 2023-06-19

## 2023-08-10 DIAGNOSIS — E78.2 MIXED HYPERLIPIDEMIA: ICD-10-CM

## 2023-08-10 RX ORDER — ATORVASTATIN CALCIUM 40 MG/1
40 TABLET, FILM COATED ORAL EVERY EVENING
Qty: 90 TABLET | Refills: 1 | Status: SHIPPED | OUTPATIENT
Start: 2023-08-10 | End: 2023-08-12 | Stop reason: CLARIF

## 2023-08-10 NOTE — TELEPHONE ENCOUNTER
Patient has been contacted however there was no answer. The line just kept ringing. Unable to leave vm. Will attempt to contact the patient at a later time.

## 2023-08-10 NOTE — TELEPHONE ENCOUNTER
Please call pt  Her lipitor was refilled  But she is due for labs  And a visit with me  labslip in chart  Thanks

## 2023-08-11 NOTE — TELEPHONE ENCOUNTER
Patient has been contacted however there was no answer. The line just kept ringing. Unable to leave vm. Will attempt to contact the patient at a later time.  (Second attempt)

## 2023-08-11 NOTE — TELEPHONE ENCOUNTER
PT's daughter Dayana Mckenzie) returned missed phone call to let us know that PT passed away 23 in [facility]. Please cancel labs order & medication refill. I have submitted a request to have chart labeled as "".

## 2024-01-04 NOTE — CASE MANAGEMENT
Case Management Discharge Planning Note    Patient name [facility] Location [place] [address] Omar Rabago* MRN [id number]  : 1940 Date 3/13/2023       Current Admission Date: 3/9/2023  Current Admission Diagnosis:Abscess, periappendiceal   Patient Active Problem List    Diagnosis Date Noted   • Abscess, periappendiceal 2023   • Dependence on respirator (ventilator) status ([address] ) 2023   • Hyperparathyroidism, unspecified ([facility] [address] ) 2023   • Hypertension    • 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 ([facility] [address] ) 2022   • Drug-induced constipation 2022   • Hypomagnesemia 2022   • Acute deep vein thrombosis (DVT) of left upper extremity ([address] ) 2022   • Continuous opioid dependence ([facility] [address] ) 2022   • Iron deficiency anemia 2022   • Age-related osteoporosis without current pathological fracture 2022   • Carotid artery stenosis 2022   • Sacroiliitis ([facility] [address] ) 06/10/2022   • Primary osteoarthritis of both hips 06/10/2022   • Chronic low back pain without sciatica 06/10/2022   • Status post lumbar spinal fusion 06/10/2022   • Seizure ([address] ) 2022   • Encephalopathy acute 2022   • Acute on chronic respiratory failure ([facility] [address] ) 2022   • Vaginal discharge 2022   • Moderate protein-calorie malnutrition ([facility] [address] ) 2022   • Dysphagia 2022   • Left arm swelling 2022   • Breast cancer ([facility] [address] ) 2022   • History of DVT (deep vein thrombosis) 2022   • Pleural effusion 2022   • Ambulatory dysfunction 2020   • Chronic atrial fibrillation ([address] ) 2017   • Coronary artery disease involving native heart without angina pectoris 2017   • Gastroesophageal reflux disease 2017   • ESRD on dialysis ([facility] [address] ) 2016   • Acute on chronic diastolic heart failure ([facility] ) 2015   • Type 2 diabetes mellitus with chronic kidney disease on chronic dialysis, with long-term current use of insulin ([facility] ) 2011   • Mixed hyperlipidemia 06/24/2011   • Gout 06/24/2011      LOS (days): 4  Geometric Mean LOS (GMLOS) (days): 5 20  Days to GMLOS:0 9     OBJECTIVE:  Risk of Unplanned Readmission Score: 45 96         Current admission status: Inpatient   Preferred Pharmacy:   [facility] [address], [place] - [address]  [address]  [facility] [address]  Phone: [phone] Fax: [phone]    Primary Care Provider: Kamlesh Contreras DO    Primary Insurance: MEDICARE  Secondary Insurance: [address] [place] [address] DETAILS:    Additional Comments: Patient reviewed during care coordination rounds with Dr Javon Zamorano who informed that pt will likely be medically stable for discharge home in 24 hours  CM spoke with pt's daughter, Peter Pastrana, who is aware and agreeable to same  IMM reviewed, pt's daughter expressed understanding  CM called Meseret Estrada ([phone]) and spoke with Cherri Paul to inform of likely discharge for tomorrow  She requested pt's AVS be faxed to F: [phone] at time of discharge  Yes